# Patient Record
Sex: FEMALE | Race: WHITE | NOT HISPANIC OR LATINO | URBAN - METROPOLITAN AREA
[De-identification: names, ages, dates, MRNs, and addresses within clinical notes are randomized per-mention and may not be internally consistent; named-entity substitution may affect disease eponyms.]

---

## 2017-02-01 DIAGNOSIS — N95.1 HOT FLASHES, MENOPAUSAL: ICD-10-CM

## 2017-02-01 RX ORDER — ESTRADIOL 2 MG/1
TABLET ORAL
Qty: 30 TABLET | Refills: 5 | Status: SHIPPED | OUTPATIENT
Start: 2017-02-01 | End: 2017-02-27 | Stop reason: SDUPTHER

## 2017-02-07 RX ORDER — GABAPENTIN 800 MG/1
TABLET ORAL
Qty: 90 TABLET | Refills: 2 | Status: SHIPPED | OUTPATIENT
Start: 2017-02-07 | End: 2017-05-01 | Stop reason: SDUPTHER

## 2017-02-12 DIAGNOSIS — I10 ESSENTIAL HYPERTENSION: ICD-10-CM

## 2017-02-13 RX ORDER — OMEPRAZOLE 20 MG/1
CAPSULE, DELAYED RELEASE ORAL
Qty: 90 CAPSULE | Refills: 1 | Status: SHIPPED | OUTPATIENT
Start: 2017-02-13 | End: 2017-08-10 | Stop reason: SDUPTHER

## 2017-02-21 ENCOUNTER — OFFICE VISIT (OUTPATIENT)
Dept: INTERNAL MEDICINE | Facility: CLINIC | Age: 47
End: 2017-02-21

## 2017-02-21 VITALS
BODY MASS INDEX: 27.98 KG/M2 | WEIGHT: 153 LBS | HEART RATE: 62 BPM | RESPIRATION RATE: 16 BRPM | DIASTOLIC BLOOD PRESSURE: 96 MMHG | SYSTOLIC BLOOD PRESSURE: 132 MMHG

## 2017-02-21 DIAGNOSIS — F41.9 ANXIETY: Chronic | ICD-10-CM

## 2017-02-21 DIAGNOSIS — K21.9 GASTROESOPHAGEAL REFLUX DISEASE WITHOUT ESOPHAGITIS: Chronic | ICD-10-CM

## 2017-02-21 DIAGNOSIS — F32.A DEPRESSION, UNSPECIFIED DEPRESSION TYPE: ICD-10-CM

## 2017-02-21 DIAGNOSIS — I10 ESSENTIAL HYPERTENSION: Primary | Chronic | ICD-10-CM

## 2017-02-21 LAB
ALBUMIN SERPL-MCNC: 4.2 G/DL (ref 3.2–4.8)
ALBUMIN/GLOB SERPL: 1.2 G/DL (ref 1.5–2.5)
ALP SERPL-CCNC: 114 U/L (ref 25–100)
ALT SERPL W P-5'-P-CCNC: 52 U/L (ref 7–40)
ANION GAP SERPL CALCULATED.3IONS-SCNC: 5 MMOL/L (ref 3–11)
AST SERPL-CCNC: 52 U/L (ref 0–33)
BILIRUB SERPL-MCNC: 0.6 MG/DL (ref 0.3–1.2)
BUN BLD-MCNC: 9 MG/DL (ref 9–23)
BUN/CREAT SERPL: 9 (ref 7–25)
CALCIUM SPEC-SCNC: 10 MG/DL (ref 8.7–10.4)
CHLORIDE SERPL-SCNC: 103 MMOL/L (ref 99–109)
CO2 SERPL-SCNC: 32 MMOL/L (ref 20–31)
CREAT BLD-MCNC: 1 MG/DL (ref 0.6–1.3)
DEPRECATED RDW RBC AUTO: 43 FL (ref 37–54)
ERYTHROCYTE [DISTWIDTH] IN BLOOD BY AUTOMATED COUNT: 12.8 % (ref 11.3–14.5)
GFR SERPL CREATININE-BSD FRML MDRD: 60 ML/MIN/1.73
GLOBULIN UR ELPH-MCNC: 3.5 GM/DL
GLUCOSE BLD-MCNC: 99 MG/DL (ref 70–100)
HCT VFR BLD AUTO: 43.8 % (ref 34.5–44)
HGB BLD-MCNC: 14.7 G/DL (ref 11.5–15.5)
MCH RBC QN AUTO: 30.6 PG (ref 27–31)
MCHC RBC AUTO-ENTMCNC: 33.6 G/DL (ref 32–36)
MCV RBC AUTO: 91.1 FL (ref 80–99)
PLATELET # BLD AUTO: 290 10*3/MM3 (ref 150–450)
PMV BLD AUTO: 10.6 FL (ref 6–12)
POTASSIUM BLD-SCNC: 4.2 MMOL/L (ref 3.5–5.5)
PROT SERPL-MCNC: 7.7 G/DL (ref 5.7–8.2)
RBC # BLD AUTO: 4.81 10*6/MM3 (ref 3.89–5.14)
SODIUM BLD-SCNC: 140 MMOL/L (ref 132–146)
WBC NRBC COR # BLD: 8.13 10*3/MM3 (ref 3.5–10.8)

## 2017-02-21 PROCEDURE — 99214 OFFICE O/P EST MOD 30 MIN: CPT | Performed by: INTERNAL MEDICINE

## 2017-02-21 PROCEDURE — 36415 COLL VENOUS BLD VENIPUNCTURE: CPT | Performed by: INTERNAL MEDICINE

## 2017-02-21 PROCEDURE — 85027 COMPLETE CBC AUTOMATED: CPT | Performed by: INTERNAL MEDICINE

## 2017-02-21 PROCEDURE — 80053 COMPREHEN METABOLIC PANEL: CPT | Performed by: INTERNAL MEDICINE

## 2017-02-21 NOTE — PROGRESS NOTES
Subjective   Luz Elena Ballard is a 46 y.o. female.   Pt is here to follow up on GERD,HTN and anxiety/depression.           History of Present Illness   Pt is here to follow up on GERD,HTN and anxiety/depression.   She states that all chronic issues are doing well, she is now enrolled in a new Suboxone clinic and doing well with this. She has been going sine December 2016 to [facility].   GERD is controlled.   She does state HTN is doing well, but when she has been going to the Suboxone clinic her systolic has been elevated in 160's.   She states the whole time she was incarcerated she did not take her estrace and did fine with all hormonal symptoms (she has had hysterectomy). When she was released from detention she restarted this and it seems her hot flashes have returned? She does not think it is the Suboxone as she took this in the past with estrace and had no issues.                 The following portions of the patient's history were reviewed and updated as appropriate: allergies, current medications, past family history, past medical history, past social history, past surgical history and problem list.           Review of Systems   Constitutional: Negative.    HENT: Negative.    Eyes: Negative.    Respiratory: Negative.    Cardiovascular:        See HPI.    Gastrointestinal:        See HPI.    Endocrine: Negative.    Genitourinary: Negative.    Musculoskeletal: Negative.    Skin: Negative.    Allergic/Immunologic: Negative.    Neurological: Negative.    Hematological: Negative.    Psychiatric/Behavioral:        See HPI.               Objective   Physical Exam   Constitutional: She is oriented to person, place, and time. She appears well-developed and well-nourished.   HENT:   Head: Normocephalic and atraumatic.   Right Ear: External ear normal.   Left Ear: External ear normal.   Nose: Nose normal.   Mouth/Throat: Oropharynx is clear and moist.   Eyes: Conjunctivae and EOM are normal. Pupils are equal, round, and  reactive to light.   Neck: Normal range of motion. Neck supple. No tracheal deviation present. No thyromegaly present.   Cardiovascular: Normal rate, regular rhythm, normal heart sounds and intact distal pulses.    Pulmonary/Chest: Effort normal and breath sounds normal.   Abdominal: Soft. Bowel sounds are normal. She exhibits no distension. There is no tenderness.   Neurological: She is alert and oriented to person, place, and time. She has normal reflexes.   Skin: Skin is warm and dry.   Psychiatric: She has a normal mood and affect.   Nursing note and vitals reviewed.            Assessment/Plan      Essential hypertension  -     Comprehensive Metabolic Panel  -     CBC (No Diff)    Gastroesophageal reflux disease without esophagitis    Anxiety    Depression, unspecified depression type        1.) Monitor BP at home, discussed optimal range, notify me if not in range.   2.) Follow up in 3 months or sooner if needed.   3.) 15 minutes spent in exam, 10 minutes spent counseling on preventative screens and vaccines.   4.) Add Black Cohosh to estrace and see if this helps hot flashes, or try stopping it. If needed we will consider increasing dose.   5.) Check CBC and CMP     * Total time spent in discussion and exam 25 minutes.

## 2017-02-27 ENCOUNTER — TELEPHONE (OUTPATIENT)
Dept: INTERNAL MEDICINE | Facility: CLINIC | Age: 47
End: 2017-02-27

## 2017-02-27 DIAGNOSIS — N95.1 HOT FLASHES, MENOPAUSAL: ICD-10-CM

## 2017-02-27 RX ORDER — ESTRADIOL 2 MG/1
2 TABLET ORAL DAILY
Qty: 30 TABLET | Refills: 5 | Status: SHIPPED | OUTPATIENT
Start: 2017-02-27 | End: 2017-03-15 | Stop reason: SDUPTHER

## 2017-02-27 NOTE — TELEPHONE ENCOUNTER
----- Message from Rylie To sent at 2/27/2017 10:59 AM EST -----  Contact: Patient  Calling to get refill but at last visit discussed increasing dosage.  Medication is Estradial.  Currently on 2mg.  Uses [facility] on [address] in [place] and patient can be reached at [phone].

## 2017-03-06 ENCOUNTER — OFFICE VISIT (OUTPATIENT)
Dept: INTERNAL MEDICINE | Facility: CLINIC | Age: 47
End: 2017-03-06

## 2017-03-06 VITALS
SYSTOLIC BLOOD PRESSURE: 120 MMHG | RESPIRATION RATE: 16 BRPM | WEIGHT: 152 LBS | HEART RATE: 66 BPM | DIASTOLIC BLOOD PRESSURE: 80 MMHG | BODY MASS INDEX: 27.8 KG/M2

## 2017-03-06 DIAGNOSIS — B96.89 BACTERIAL VAGINOSIS: ICD-10-CM

## 2017-03-06 DIAGNOSIS — N76.0 BACTERIAL VAGINOSIS: ICD-10-CM

## 2017-03-06 DIAGNOSIS — J06.9 URTI (ACUTE UPPER RESPIRATORY INFECTION): Primary | ICD-10-CM

## 2017-03-06 PROCEDURE — 99214 OFFICE O/P EST MOD 30 MIN: CPT | Performed by: INTERNAL MEDICINE

## 2017-03-06 RX ORDER — CLINDAMYCIN HYDROCHLORIDE 300 MG/1
300 CAPSULE ORAL 2 TIMES DAILY
Qty: 20 CAPSULE | Refills: 0 | Status: SHIPPED | OUTPATIENT
Start: 2017-03-06 | End: 2017-04-10

## 2017-03-06 RX ORDER — FLUCONAZOLE 150 MG/1
150 TABLET ORAL ONCE
Qty: 1 TABLET | Refills: 1 | Status: SHIPPED | OUTPATIENT
Start: 2017-03-06 | End: 2017-07-02 | Stop reason: SDUPTHER

## 2017-03-06 NOTE — PROGRESS NOTES
Subjective   Luz Elena Ballard is a 46 y.o. female.   Pt presents today with URTI symptoms and bacterial vaginosis.           History of Present Illness   Pt presents today with URTI symptoms and bacterial vaginosis. She states she has had URTI symptoms including non productive cough and sinus congestion for 1 week. She denies fever or chills. She recently went to the health dept.and was diagnosed with bacterial vaginosis. She took 1 round of Flagyl for 7 days but states her symptoms did not completely go away.  She would also like diflucan to be prescribed as she gets a yeast infection every time she takes antibiotics.             The following portions of the patient's history were reviewed and updated as appropriate: allergies, current medications, past family history, past medical history, past social history, past surgical history and problem list.             Review of Systems   Constitutional: Negative.    HENT:        See HPI.    Eyes: Negative.    Respiratory:        See HPI.    Cardiovascular: Negative.    Gastrointestinal: Negative.    Endocrine: Negative.    Genitourinary:        See HPI.    Musculoskeletal: Negative.    Skin: Negative.    Allergic/Immunologic: Negative.    Neurological: Negative.    Hematological: Negative.    Psychiatric/Behavioral: Negative.                 Objective   Physical Exam   Constitutional: She is oriented to person, place, and time. She appears well-developed and well-nourished.   HENT:   Head: Normocephalic and atraumatic.   Nose: Nose normal.   Bilateral effusions.  Moderate oropharyngeal drainage.   Tenderness to palpation of maxillary sinuses.    Eyes: Conjunctivae and EOM are normal. Pupils are equal, round, and reactive to light.   Neck: Normal range of motion. Neck supple.   Cardiovascular: Normal rate, regular rhythm, normal heart sounds and intact distal pulses.    Pulmonary/Chest: Effort normal and breath sounds normal.   Abdominal: Soft. Bowel sounds are normal.    Neurological: She is alert and oriented to person, place, and time. She has normal reflexes.   Skin: Skin is warm and dry.   Psychiatric: She has a normal mood and affect.   Nursing note and vitals reviewed.               Assessment/Plan    URTI (acute upper respiratory infection)  -     clindamycin (CLEOCIN) 300 MG capsule; Take 1 capsule by mouth 2 (Two) Times a Day.  -     fluconazole (DIFLUCAN) 150 MG tablet; Take 1 tablet by mouth 1 (One) Time for 1 dose.    Bacterial vaginosis      1.) Clindamycin 300 mg PO BID X 10 days, How to dose and possible s/e discussed.   2.) Diflucan 150 mg PO X 1 Dose, wait 72 hours and if not clear, take 2nd dose. How to dose and possible s/e discussed.   3.) Sinus rinse as directed.   4.) Keep scheduled follow up

## 2017-03-15 ENCOUNTER — TELEPHONE (OUTPATIENT)
Dept: INTERNAL MEDICINE | Facility: CLINIC | Age: 47
End: 2017-03-15

## 2017-03-15 DIAGNOSIS — E34.9 HORMONE IMBALANCE: Primary | ICD-10-CM

## 2017-03-15 DIAGNOSIS — N95.1 HOT FLASHES, MENOPAUSAL: ICD-10-CM

## 2017-03-15 DIAGNOSIS — F32.A DEPRESSION: ICD-10-CM

## 2017-03-15 RX ORDER — FLUOXETINE HYDROCHLORIDE 20 MG/1
CAPSULE ORAL
Qty: 90 CAPSULE | Refills: 4 | Status: SHIPPED | OUTPATIENT
Start: 2017-03-15 | End: 2017-08-12 | Stop reason: SDUPTHER

## 2017-03-15 RX ORDER — ESTRADIOL 2 MG/1
2 TABLET ORAL DAILY
Qty: 30 TABLET | Refills: 5 | Status: SHIPPED | OUTPATIENT
Start: 2017-03-15 | End: 2017-08-26 | Stop reason: SDUPTHER

## 2017-03-15 RX ORDER — LISINOPRIL 20 MG/1
TABLET ORAL
Qty: 30 TABLET | Refills: 4 | Status: SHIPPED | OUTPATIENT
Start: 2017-03-15 | End: 2017-08-10 | Stop reason: SDUPTHER

## 2017-03-15 NOTE — TELEPHONE ENCOUNTER
Pt has been informed of 's message and that someone should contact her soon concerning GYN referral

## 2017-03-15 NOTE — TELEPHONE ENCOUNTER
[phone]    NEEDS REFILL OF ESTRODIAL 2MG-SHE HAS DOUBLED THIS SO IS ALMOST OUT AND IT IS WORKING SHE IS TAKING 4MG 1X DAY.    [facility]

## 2017-03-15 NOTE — TELEPHONE ENCOUNTER
I cant advise her to remain on 4 mg daily, I have put in the referral to GYN and if they say 4 mg is ok then I will be fine as well.

## 2017-03-15 NOTE — TELEPHONE ENCOUNTER
Informed pt and she really prefers to stay on the 4 mg because it is really working for the hot flashes, she has been doing this for 2 weeks now  and she agrees to see a GYN also if she can just stay on the 4 mg for now.

## 2017-03-15 NOTE — TELEPHONE ENCOUNTER
----- Message from Lisa Monge sent at 3/15/2017 11:42 AM EDT -----  PATIENT HAS QUESTIONS REGARDING MEDICATIONS THAT WERE CALLED IN. ESTRACE IS SUPPOSED TO BE 4MG SHE HAS BEEN DOUBLING ON THE 2. PLEASE CALL TO DISCUSS.    PHARMACY: JAVIER RODRIGUEZ [facility]    [phone], PATIENT

## 2017-03-15 NOTE — TELEPHONE ENCOUNTER
I want her to go back down to 2mg PO daily for 21 days on and 7 days off.     If this is not working I need to send her to GYN

## 2017-04-07 PROCEDURE — 87186 SC STD MICRODIL/AGAR DIL: CPT | Performed by: FAMILY MEDICINE

## 2017-04-07 PROCEDURE — 87147 CULTURE TYPE IMMUNOLOGIC: CPT | Performed by: FAMILY MEDICINE

## 2017-04-07 PROCEDURE — 87077 CULTURE AEROBIC IDENTIFY: CPT | Performed by: FAMILY MEDICINE

## 2017-04-07 PROCEDURE — 87070 CULTURE OTHR SPECIMN AEROBIC: CPT | Performed by: FAMILY MEDICINE

## 2017-04-10 ENCOUNTER — TELEPHONE (OUTPATIENT)
Dept: URGENT CARE | Facility: CLINIC | Age: 47
End: 2017-04-10

## 2017-04-10 ENCOUNTER — OFFICE VISIT (OUTPATIENT)
Dept: OBSTETRICS AND GYNECOLOGY | Facility: CLINIC | Age: 47
End: 2017-04-10

## 2017-04-10 VITALS
BODY MASS INDEX: 29.27 KG/M2 | RESPIRATION RATE: 14 BRPM | WEIGHT: 155 LBS | HEIGHT: 61 IN | OXYGEN SATURATION: 98 % | DIASTOLIC BLOOD PRESSURE: 78 MMHG | SYSTOLIC BLOOD PRESSURE: 116 MMHG | HEART RATE: 69 BPM

## 2017-04-10 DIAGNOSIS — Z12.39 SCREENING BREAST EXAMINATION: Primary | ICD-10-CM

## 2017-04-10 PROBLEM — Z72.0 TOBACCO ABUSE: Status: ACTIVE | Noted: 2017-04-10

## 2017-04-10 PROBLEM — N95.1 VASOMOTOR SYMPTOMS DUE TO MENOPAUSE: Status: ACTIVE | Noted: 2017-04-10

## 2017-04-10 PROBLEM — Z01.419 WELL WOMAN EXAM WITH ROUTINE GYNECOLOGICAL EXAM: Status: ACTIVE | Noted: 2017-04-10

## 2017-04-10 PROCEDURE — 99386 PREV VISIT NEW AGE 40-64: CPT | Performed by: OBSTETRICS & GYNECOLOGY

## 2017-04-10 NOTE — PROGRESS NOTES
"Subjective   Chief Complaint   Patient presents with   • Establish Care     Hot flashes and night sweats     Luz Elena Ballard is a 46 y.o. year old  menopausal female presenting to be seen for her annual exam.  This past year she has been on hormone replacement therapy.  There has not been vaginal bleeding in the last 12 months.  Menopausal symptoms are present (decreased libido, hot flashes, moodiness, night sweats and vaginal dryness) and are significantly affecting her quality of life.    SEXUAL Hx:  She is currently sexually active.  In the past year there has not been new sexual partners.    Condoms are not typically used.  She would not like to be screened for STD's at today's exam.  HEALTH Hx:  She exercises regularly: no (and has no plans to become more active).  She wears her seat belt: yes.  She has concerns about domestic violence: no.  She has noticed changes in height: no.  OTHER COMPLAINTS:  Nothing else    The following portions of the patient's history were reviewed and updated as appropriate:problem list, current medications, allergies, past family history, past medical history, past social history and past surgical history.    Smoking status: Current Every Day Smoker                                                   Packs/day: 0.00      Years: 0.00         Smokeless status: Not on file                         Review of Systems  Constitutional POS: fatigue, malaise, night sweats and weight gain    NEG: anorexia or night sweats   Gastointestinal POS: nothing reported    NEG: bloating, change in bowel habits, melena or reflux symptoms   Genitourinary POS: Vaginal dryness, dyspareunia    NEG: dysuria or hematuria   Integument POS: nothing reported    NEG: moles that are changing in size, shape, color or rashes   Breast POS: nothing reported    NEG: persistent breast lump, skin dimpling or nipple discharge        Objective   /78  Pulse 69  Resp 14  Ht 61\" (154.9 cm)  Wt 155 lb (70.3 kg)  " SpO2 98%  BMI 29.29 kg/m2    General:  well developed; well nourished  no acute distress   Skin:  No suspicious lesions seen   Thyroid: normal to inspection and palpation   Breasts:  Examined in supine position  Symmetric without masses or skin dimpling  Nipples normal without inversion, lesions or discharge  There are no palpable axillary nodes  Fibrocystic changes are present both breasts without a discrete mass   Abdomen: soft, non-tender; no masses  no umbilical or inginual hernias are present  no hepato-splenomegaly   Pelvis: Clinical staff was present for exam  External genitalia:  normal appearance of the external genitalia including Bartholin's and [place]'s glands.  :  urethral meatus normal; urethral hypermobility is absent.  Vaginal:  atrophic mucosal changes are present;  Cervix:  absent.  Uterus:  absent.  Adnexa:  absent, bilateral.        Assessment   1. Well woman exam  2. Vasomotor symptoms  3. Tobacco abuse     Plan   1. Await mammogram results for plan of care  2. Discussed at length the risks of smoking with concomitant hormone use including the risk of stroke, blood clot and heart attack.  Informed patient that she needs to immediately quit smoking and to diminish her use of estradiol as she is currently taking 2 mg daily.  Will incorporate Aygestin to see if that will improve vasomotor symptoms.  Patient will return to clinic in 1 month for follow-up  3. Patient states that she is not a daily smoker and was given a plan to wean.  Patient stated plans to have stopped smoking by the next visit      New Medications Ordered This Visit   Medications   • norethindrone (AYGESTIN) 5 MG tablet     Sig: Take 1 tablet by mouth Daily.     Dispense:  90 tablet     Refill:  3          This note was electronically signed.    Gurmeet Herrera MD MBA  April 10, 2017

## 2017-04-10 NOTE — TELEPHONE ENCOUNTER
Discussed lab results with the patient.  She advised the wound appears to be fine.  Advised patient that we can call in another antibiotic or have me look at the wound.  The patient elected the latter and will come in later today.

## 2017-04-11 ENCOUNTER — TELEPHONE (OUTPATIENT)
Dept: URGENT CARE | Facility: CLINIC | Age: 47
End: 2017-04-11

## 2017-04-11 NOTE — TELEPHONE ENCOUNTER
"Per Dr. Walker, spoke to patient re: positive lab results & to get clarification re: meds.  Patient was already aware that her culture came back positive for MRSA.  Stated that \"the lady that was here\" told her to stop cephalexin & start cream that was prescribed.  Patient states she is better.    "

## 2017-05-01 RX ORDER — GABAPENTIN 800 MG/1
TABLET ORAL
Qty: 90 TABLET | Refills: 2 | Status: SHIPPED | OUTPATIENT
Start: 2017-05-01 | End: 2017-08-17 | Stop reason: SDUPTHER

## 2017-06-20 ENCOUNTER — OFFICE VISIT (OUTPATIENT)
Dept: INTERNAL MEDICINE | Facility: CLINIC | Age: 47
End: 2017-06-20

## 2017-06-20 VITALS
HEART RATE: 76 BPM | TEMPERATURE: 98.8 F | BODY MASS INDEX: 29.17 KG/M2 | DIASTOLIC BLOOD PRESSURE: 84 MMHG | WEIGHT: 154.4 LBS | SYSTOLIC BLOOD PRESSURE: 126 MMHG

## 2017-06-20 DIAGNOSIS — J40 BRONCHITIS: Primary | ICD-10-CM

## 2017-06-20 DIAGNOSIS — I10 ESSENTIAL HYPERTENSION: Chronic | ICD-10-CM

## 2017-06-20 PROCEDURE — 99214 OFFICE O/P EST MOD 30 MIN: CPT | Performed by: INTERNAL MEDICINE

## 2017-06-20 RX ORDER — ONDANSETRON 4 MG/1
TABLET, ORALLY DISINTEGRATING ORAL
Refills: 0 | COMMUNITY
Start: 2017-06-13 | End: 2018-01-17 | Stop reason: SDUPTHER

## 2017-06-20 RX ORDER — NITROFURANTOIN 25; 75 MG/1; MG/1
CAPSULE ORAL
Refills: 0 | COMMUNITY
Start: 2017-06-13 | End: 2017-07-05

## 2017-06-20 RX ORDER — PROMETHAZINE HYDROCHLORIDE AND CODEINE PHOSPHATE 6.25; 1 MG/5ML; MG/5ML
SYRUP ORAL
Qty: 120 ML | Refills: 1 | Status: SHIPPED | OUTPATIENT
Start: 2017-06-20 | End: 2017-10-13

## 2017-06-20 RX ORDER — CEFDINIR 300 MG/1
300 CAPSULE ORAL 2 TIMES DAILY
Qty: 14 CAPSULE | Refills: 0 | Status: SHIPPED | OUTPATIENT
Start: 2017-06-20 | End: 2017-07-05

## 2017-06-20 RX ORDER — METHYLPREDNISOLONE 4 MG/1
TABLET ORAL
Qty: 21 TABLET | Refills: 0 | Status: SHIPPED | OUTPATIENT
Start: 2017-06-20 | End: 2017-07-05

## 2017-06-20 NOTE — PROGRESS NOTES
Subjective   Luz Elena Ballard is a 47 y.o. female.     History of Present Illness   2 week history of cough and congestion.  No fever, sore throat, ear ache.  She has some wheezing recently and sob.  She does smoke.  Sometimes she cannot sleep because of coughing.    The following portions of the patient's history were reviewed and updated as appropriate: allergies, current medications, past medical history and problem list.    Review of Systems   Constitutional: Positive for fatigue.   HENT: Positive for congestion. Negative for ear pain, rhinorrhea and sinus pressure.    Respiratory: Positive for cough, chest tightness, shortness of breath and wheezing.    Cardiovascular: Negative.  Negative for chest pain, palpitations and leg swelling.   Gastrointestinal: Negative.  Negative for abdominal distention and abdominal pain.       Objective   Physical Exam   Constitutional: She appears well-developed and well-nourished.   Neck: Normal range of motion. Neck supple.   Cardiovascular: Normal rate, regular rhythm and normal heart sounds.  Exam reveals no gallop and no friction rub.    No murmur heard.  Pulmonary/Chest: Effort normal. No respiratory distress. She has wheezes. She has no rales. She exhibits no tenderness.   Rhonchi at bases bilaterally.   Abdominal: Soft. Bowel sounds are normal. She exhibits no distension. There is no tenderness.   Nursing note and vitals reviewed.      Assessment/Plan   Luz Elena was seen today for cough.    Diagnoses and all orders for this visit:    Bronchitis  -     MethylPREDNISolone (MEDROL, FELIBERTO,) 4 MG tablet; Take as directed on package instructions.  -     cefdinir (OMNICEF) 300 MG capsule; Take 1 capsule by mouth 2 (Two) Times a Day.  -     promethazine-codeine (PHENERGAN with CODEINE) 6.25-10 MG/5ML syrup; One teaspoon q 6 hours prn cough    Essential hypertension    Believe she has asthmatic bronchitis.  Treat as above.  Call if not better.

## 2017-06-27 ENCOUNTER — TELEPHONE (OUTPATIENT)
Dept: INTERNAL MEDICINE | Facility: CLINIC | Age: 47
End: 2017-06-27

## 2017-07-02 DIAGNOSIS — J06.9 URTI (ACUTE UPPER RESPIRATORY INFECTION): ICD-10-CM

## 2017-07-03 RX ORDER — FLUCONAZOLE 150 MG/1
TABLET ORAL
Qty: 1 TABLET | Refills: 1 | Status: SHIPPED | OUTPATIENT
Start: 2017-07-03 | End: 2017-07-05

## 2017-07-03 NOTE — TELEPHONE ENCOUNTER
Pt states her ear pain is better. She now has a yeast infection which diflucan was requested by pharmacy earlier today and already sent in. Pt follows up with Dr. Payne on 7/5 and stated she would be fine until then.

## 2017-07-03 NOTE — TELEPHONE ENCOUNTER
LVM notifying pt Rx sent to pharmacy. Advised to call if any further questions and provided office number.

## 2017-07-05 ENCOUNTER — OFFICE VISIT (OUTPATIENT)
Dept: INTERNAL MEDICINE | Facility: CLINIC | Age: 47
End: 2017-07-05

## 2017-07-05 VITALS
DIASTOLIC BLOOD PRESSURE: 82 MMHG | SYSTOLIC BLOOD PRESSURE: 120 MMHG | WEIGHT: 153 LBS | RESPIRATION RATE: 16 BRPM | BODY MASS INDEX: 28.91 KG/M2 | HEART RATE: 72 BPM | TEMPERATURE: 98.6 F

## 2017-07-05 DIAGNOSIS — F41.9 ANXIETY: Chronic | ICD-10-CM

## 2017-07-05 DIAGNOSIS — I10 ESSENTIAL HYPERTENSION: Primary | Chronic | ICD-10-CM

## 2017-07-05 DIAGNOSIS — M25.512 CHRONIC LEFT SHOULDER PAIN: ICD-10-CM

## 2017-07-05 DIAGNOSIS — K21.9 GASTROESOPHAGEAL REFLUX DISEASE WITHOUT ESOPHAGITIS: Chronic | ICD-10-CM

## 2017-07-05 DIAGNOSIS — K58.9 IRRITABLE BOWEL SYNDROME, UNSPECIFIED TYPE: Chronic | ICD-10-CM

## 2017-07-05 DIAGNOSIS — G89.29 CHRONIC LEFT SHOULDER PAIN: ICD-10-CM

## 2017-07-05 DIAGNOSIS — F32.A DEPRESSION, UNSPECIFIED DEPRESSION TYPE: ICD-10-CM

## 2017-07-05 PROBLEM — Z01.419 WELL WOMAN EXAM WITH ROUTINE GYNECOLOGICAL EXAM: Status: RESOLVED | Noted: 2017-04-10 | Resolved: 2017-07-05

## 2017-07-05 PROBLEM — Z12.39 SCREENING BREAST EXAMINATION: Status: RESOLVED | Noted: 2017-04-10 | Resolved: 2017-07-05

## 2017-07-05 PROBLEM — J06.9 URTI (ACUTE UPPER RESPIRATORY INFECTION): Status: RESOLVED | Noted: 2017-03-06 | Resolved: 2017-07-05

## 2017-07-05 PROCEDURE — 99214 OFFICE O/P EST MOD 30 MIN: CPT | Performed by: INTERNAL MEDICINE

## 2017-07-05 RX ORDER — HYDROXYZINE HYDROCHLORIDE 10 MG/1
10 TABLET, FILM COATED ORAL EVERY 6 HOURS PRN
Qty: 30 TABLET | Refills: 3 | Status: SHIPPED | OUTPATIENT
Start: 2017-07-05 | End: 2017-10-13 | Stop reason: SDUPTHER

## 2017-07-05 NOTE — PROGRESS NOTES
Subjective   Luz Elena Ballard is a 47 y.o. female.   Pt is here to follow up on HTN,GERD,IBS,chronic left shoulder pain, anxiety/depression.             History of Present Illness   Pt is here to follow up on HTN,GERD,IBS,chronic left shoulder pain, anxiety/depression.   She states all chronic issues are doing well. That being said, she would like something she can take prn for her anxiety that would not interfere with her suboxone. She does not want to go up on her Prozac as it usually works well. She feels her anxiety is not as well controlled because they have started titrating down on her suboxone.   Bronchitis has resolved.               The following portions of the patient's history were reviewed and updated as appropriate: allergies, current medications, past family history, past medical history, past social history, past surgical history and problem list.             Review of Systems   Constitutional: Negative.    HENT: Negative.    Eyes: Negative.    Respiratory: Negative.    Cardiovascular:        See HPI.    Gastrointestinal:        See HPI.    Endocrine: Negative.    Genitourinary: Negative.    Musculoskeletal:        See HPI.    Skin: Negative.    Allergic/Immunologic: Negative.    Neurological: Negative.    Hematological: Negative.    Psychiatric/Behavioral:        See HPI.            Objective   Physical Exam   Constitutional: She is oriented to person, place, and time. She appears well-developed and well-nourished.   HENT:   Head: Normocephalic and atraumatic.   Right Ear: External ear normal.   Left Ear: External ear normal.   Nose: Nose normal.   Mouth/Throat: Oropharynx is clear and moist.   Eyes: Conjunctivae and EOM are normal. Pupils are equal, round, and reactive to light.   Neck: Normal range of motion. Neck supple.   Cardiovascular: Normal rate, regular rhythm, normal heart sounds and intact distal pulses.    Pulmonary/Chest: Effort normal and breath sounds normal.   Abdominal: Soft. Bowel  sounds are normal. She exhibits no distension. There is no tenderness.   Neurological: She is alert and oriented to person, place, and time. She has normal reflexes.   Skin: Skin is warm and dry.   Psychiatric: She has a normal mood and affect.   Nursing note and vitals reviewed.            Assessment/Plan      Essential hypertension    Gastroesophageal reflux disease without esophagitis    Irritable bowel syndrome, unspecified type    Chronic left shoulder pain    Anxiety  -     hydrOXYzine (ATARAX) 10 MG tablet; Take 1 tablet by mouth Every 6 (Six) Hours As Needed for Anxiety.    Depression, unspecified depression type        1.) Hydroxyzine 10 mg PO Q 6 prn for anxiety , how to dose and possible s/e discussed   2.) Follow up in 3 months

## 2017-07-17 ENCOUNTER — TELEPHONE (OUTPATIENT)
Dept: INTERNAL MEDICINE | Facility: CLINIC | Age: 47
End: 2017-07-17

## 2017-07-17 NOTE — TELEPHONE ENCOUNTER
Let's start out with Tessalon Perles 100 mg PO TID prn # 90 with no refills.     If this does not work she can call and let me know.

## 2017-07-17 NOTE — TELEPHONE ENCOUNTER
----- Message from Rylie To sent at 7/17/2017 10:55 AM EDT -----  Needs something called in for her cough.  Says Dr. Payne knows she has asthmatic  bronchitis and right now patient can't get rid of cough.  OTC meds do not work.  Requesting cough med that will help her sleep as it is worse at night.  Uses [facility] [address] in [place] and patient can be reached at [phone]

## 2017-07-24 ENCOUNTER — TELEPHONE (OUTPATIENT)
Dept: INTERNAL MEDICINE | Facility: CLINIC | Age: 47
End: 2017-07-24

## 2017-07-25 RX ORDER — GABAPENTIN 800 MG/1
TABLET ORAL
Qty: 90 TABLET | Refills: 2 | OUTPATIENT
Start: 2017-07-25

## 2017-08-10 DIAGNOSIS — I10 ESSENTIAL HYPERTENSION: ICD-10-CM

## 2017-08-10 RX ORDER — OMEPRAZOLE 20 MG/1
CAPSULE, DELAYED RELEASE ORAL
Qty: 90 CAPSULE | Refills: 1 | Status: SHIPPED | OUTPATIENT
Start: 2017-08-10 | End: 2017-10-13 | Stop reason: SDUPTHER

## 2017-08-10 RX ORDER — LISINOPRIL 20 MG/1
TABLET ORAL
Qty: 30 TABLET | Refills: 4 | Status: SHIPPED | OUTPATIENT
Start: 2017-08-10 | End: 2018-01-17 | Stop reason: SDUPTHER

## 2017-08-12 DIAGNOSIS — F32.A DEPRESSION: ICD-10-CM

## 2017-08-14 RX ORDER — FLUOXETINE HYDROCHLORIDE 20 MG/1
CAPSULE ORAL
Qty: 90 CAPSULE | Refills: 4 | Status: SHIPPED | OUTPATIENT
Start: 2017-08-14 | End: 2018-01-17 | Stop reason: SDUPTHER

## 2017-08-18 RX ORDER — GABAPENTIN 800 MG/1
TABLET ORAL
Qty: 90 TABLET | Refills: 0 | Status: SHIPPED | OUTPATIENT
Start: 2017-08-18 | End: 2017-09-18 | Stop reason: SDUPTHER

## 2017-08-26 DIAGNOSIS — N95.1 HOT FLASHES, MENOPAUSAL: ICD-10-CM

## 2017-08-28 RX ORDER — ESTRADIOL 2 MG/1
TABLET ORAL
Qty: 30 TABLET | Refills: 5 | Status: SHIPPED | OUTPATIENT
Start: 2017-08-28 | End: 2018-01-17 | Stop reason: SDUPTHER

## 2017-09-18 ENCOUNTER — TELEPHONE (OUTPATIENT)
Dept: INTERNAL MEDICINE | Facility: CLINIC | Age: 47
End: 2017-09-18

## 2017-09-18 RX ORDER — GABAPENTIN 800 MG/1
800 TABLET ORAL 3 TIMES DAILY
Qty: 90 TABLET | Refills: 0 | OUTPATIENT
Start: 2017-09-18 | End: 2017-10-13 | Stop reason: SDUPTHER

## 2017-09-18 NOTE — TELEPHONE ENCOUNTER
----- Message from Orquidea Mccracken sent at 9/18/2017 12:02 PM EDT -----  [phone]    PT NEEDS REFILL OF NEURONTIN.  CAN SHE GET THIS OR DOES SHE NEED TO BE SEEN?    JAVIER JOYNER

## 2017-10-13 ENCOUNTER — OFFICE VISIT (OUTPATIENT)
Dept: INTERNAL MEDICINE | Facility: CLINIC | Age: 47
End: 2017-10-13

## 2017-10-13 VITALS
RESPIRATION RATE: 16 BRPM | HEART RATE: 68 BPM | SYSTOLIC BLOOD PRESSURE: 132 MMHG | BODY MASS INDEX: 27.07 KG/M2 | DIASTOLIC BLOOD PRESSURE: 86 MMHG | TEMPERATURE: 97.9 F | OXYGEN SATURATION: 97 % | WEIGHT: 148 LBS

## 2017-10-13 DIAGNOSIS — F41.9 ANXIETY: Chronic | ICD-10-CM

## 2017-10-13 DIAGNOSIS — Z79.899 HIGH RISK MEDICATION USE: Primary | ICD-10-CM

## 2017-10-13 DIAGNOSIS — I10 ESSENTIAL HYPERTENSION: ICD-10-CM

## 2017-10-13 PROBLEM — B96.89 BACTERIAL VAGINOSIS: Status: RESOLVED | Noted: 2017-03-06 | Resolved: 2017-10-13

## 2017-10-13 PROBLEM — N76.0 BACTERIAL VAGINOSIS: Status: RESOLVED | Noted: 2017-03-06 | Resolved: 2017-10-13

## 2017-10-13 PROCEDURE — 99214 OFFICE O/P EST MOD 30 MIN: CPT | Performed by: PHYSICIAN ASSISTANT

## 2017-10-13 RX ORDER — OMEPRAZOLE 20 MG/1
20 CAPSULE, DELAYED RELEASE ORAL DAILY
Qty: 30 CAPSULE | Refills: 5 | Status: SHIPPED | OUTPATIENT
Start: 2017-10-13 | End: 2018-01-17 | Stop reason: SDUPTHER

## 2017-10-13 RX ORDER — HYDROXYZINE HYDROCHLORIDE 10 MG/1
10 TABLET, FILM COATED ORAL EVERY 6 HOURS PRN
Qty: 30 TABLET | Refills: 3 | Status: SHIPPED | OUTPATIENT
Start: 2017-10-13 | End: 2018-01-17 | Stop reason: SDUPTHER

## 2017-10-13 RX ORDER — IBUPROFEN 800 MG/1
800 TABLET ORAL EVERY 8 HOURS PRN
Qty: 90 TABLET | Refills: 2 | Status: SHIPPED | OUTPATIENT
Start: 2017-10-13 | End: 2018-01-17 | Stop reason: SDUPTHER

## 2017-10-13 NOTE — PROGRESS NOTES
Subjective   Luz Elena Ballard is a 47 y.o. female.   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  On prozac for anxiety and depression 60mg daily.    On gabapentin for chronic back pain and hx of osteomyelitis of the spine  HTn controlled on lisinopril.  The following portions of the patient's history were reviewed and updated as appropriate: allergies, current medications and problem list.    Review of Systems   Musculoskeletal: Positive for back pain.   Neurological: Negative for headaches.   Psychiatric/Behavioral: The patient is nervous/anxious.        Objective   Physical Exam   Constitutional: She appears well-developed and well-nourished.   HENT:   Head: Normocephalic and atraumatic.   Right Ear: External ear normal.   Left Ear: External ear normal.   Eyes: Conjunctivae are normal.   Cardiovascular: Normal rate, regular rhythm and normal heart sounds.  Exam reveals no gallop and no friction rub.    No murmur heard.  Pulmonary/Chest: Effort normal and breath sounds normal.   Psychiatric: She has a normal mood and affect.   Vitals reviewed.      Assessment/Plan   Luz Elena was seen today for hypertension.    Diagnoses and all orders for this visit:    High risk medication use  -     Cancel: Urine Drug Screen - Urine, Clean Catch    Anxiety  -     hydrOXYzine (ATARAX) 10 MG tablet; Take 1 tablet by mouth Every 6 (Six) Hours As Needed for Anxiety.    Essential hypertension  -     omeprazole (priLOSEC) 20 MG capsule; Take 1 capsule by mouth Daily.    Other orders  -     ibuprofen (ADVIL,MOTRIN) 800 MG tablet; Take 1 tablet by mouth Every 8 (Eight) Hours As Needed for Mild Pain .        JODI signed and seble reviewed and UDS done

## 2017-10-15 RX ORDER — GABAPENTIN 800 MG/1
800 TABLET ORAL 3 TIMES DAILY
Qty: 90 TABLET | Refills: 3 | OUTPATIENT
Start: 2017-10-15 | End: 2018-07-27 | Stop reason: SDUPTHER

## 2017-10-16 ENCOUNTER — TELEPHONE (OUTPATIENT)
Dept: INTERNAL MEDICINE | Facility: CLINIC | Age: 47
End: 2017-10-16

## 2017-10-16 NOTE — TELEPHONE ENCOUNTER
----- Message from Lisa Galeano sent at 10/16/2017 11:03 AM EDT -----  Patient called in regards to requesting a prescription refill be called into her pharmacy.    Prescription needed: Gabapentin     Pharmacy: [facility] [place]    Call back for patient: [phone]    Thank you.

## 2017-10-16 NOTE — TELEPHONE ENCOUNTER
Script called into pharmacy.     Call to pt to notify and had to LM.    Please notify pt script has been called into pharmacy.

## 2017-10-16 NOTE — TELEPHONE ENCOUNTER
Rx was supposed to called in already.  Pls get seble and if normal, then call into pharmacy and let pt know.

## 2018-01-17 ENCOUNTER — APPOINTMENT (OUTPATIENT)
Dept: LAB | Facility: HOSPITAL | Age: 48
End: 2018-01-17

## 2018-01-17 ENCOUNTER — OFFICE VISIT (OUTPATIENT)
Dept: FAMILY MEDICINE CLINIC | Facility: CLINIC | Age: 48
End: 2018-01-17

## 2018-01-17 VITALS
BODY MASS INDEX: 27.79 KG/M2 | DIASTOLIC BLOOD PRESSURE: 90 MMHG | SYSTOLIC BLOOD PRESSURE: 132 MMHG | OXYGEN SATURATION: 98 % | HEIGHT: 62 IN | TEMPERATURE: 98.3 F | WEIGHT: 151 LBS | HEART RATE: 82 BPM

## 2018-01-17 DIAGNOSIS — B18.2 CHRONIC HEPATITIS C WITHOUT HEPATIC COMA (HCC): ICD-10-CM

## 2018-01-17 DIAGNOSIS — I10 ESSENTIAL HYPERTENSION: Primary | Chronic | ICD-10-CM

## 2018-01-17 DIAGNOSIS — M54.41 CHRONIC MIDLINE LOW BACK PAIN WITH RIGHT-SIDED SCIATICA: ICD-10-CM

## 2018-01-17 DIAGNOSIS — F41.9 ANXIETY: Chronic | ICD-10-CM

## 2018-01-17 DIAGNOSIS — F32.A DEPRESSION, UNSPECIFIED DEPRESSION TYPE: ICD-10-CM

## 2018-01-17 DIAGNOSIS — K21.9 GASTROESOPHAGEAL REFLUX DISEASE WITHOUT ESOPHAGITIS: Chronic | ICD-10-CM

## 2018-01-17 DIAGNOSIS — Z86.69 HISTORY OF MIGRAINE: ICD-10-CM

## 2018-01-17 DIAGNOSIS — N95.1 HOT FLASHES, MENOPAUSAL: ICD-10-CM

## 2018-01-17 DIAGNOSIS — Z72.0 TOBACCO ABUSE: ICD-10-CM

## 2018-01-17 DIAGNOSIS — G89.29 CHRONIC MIDLINE LOW BACK PAIN WITH RIGHT-SIDED SCIATICA: ICD-10-CM

## 2018-01-17 DIAGNOSIS — Z12.39 BREAST CANCER SCREENING: ICD-10-CM

## 2018-01-17 LAB
ALBUMIN SERPL-MCNC: 4 G/DL (ref 3.2–4.8)
ALBUMIN/GLOB SERPL: 1.1 G/DL (ref 1.5–2.5)
ALP SERPL-CCNC: 116 U/L (ref 25–100)
ALT SERPL W P-5'-P-CCNC: 51 U/L (ref 7–40)
ANION GAP SERPL CALCULATED.3IONS-SCNC: 7 MMOL/L (ref 3–11)
AST SERPL-CCNC: 51 U/L (ref 0–33)
BASOPHILS # BLD AUTO: 0.03 10*3/MM3 (ref 0–0.2)
BASOPHILS NFR BLD AUTO: 0.3 % (ref 0–1)
BILIRUB SERPL-MCNC: 0.8 MG/DL (ref 0.3–1.2)
BUN BLD-MCNC: 7 MG/DL (ref 9–23)
BUN/CREAT SERPL: 7.8 (ref 7–25)
CALCIUM SPEC-SCNC: 9.4 MG/DL (ref 8.7–10.4)
CHLORIDE SERPL-SCNC: 101 MMOL/L (ref 99–109)
CO2 SERPL-SCNC: 28 MMOL/L (ref 20–31)
CREAT BLD-MCNC: 0.9 MG/DL (ref 0.6–1.3)
DEPRECATED RDW RBC AUTO: 44.2 FL (ref 37–54)
EOSINOPHIL # BLD AUTO: 0.07 10*3/MM3 (ref 0–0.3)
EOSINOPHIL NFR BLD AUTO: 0.8 % (ref 0–3)
ERYTHROCYTE [DISTWIDTH] IN BLOOD BY AUTOMATED COUNT: 13.4 % (ref 11.3–14.5)
GFR SERPL CREATININE-BSD FRML MDRD: 67 ML/MIN/1.73
GLOBULIN UR ELPH-MCNC: 3.8 GM/DL
GLUCOSE BLD-MCNC: 81 MG/DL (ref 70–100)
HCT VFR BLD AUTO: 40.6 % (ref 34.5–44)
HGB BLD-MCNC: 13.8 G/DL (ref 11.5–15.5)
IMM GRANULOCYTES # BLD: 0.01 10*3/MM3 (ref 0–0.03)
IMM GRANULOCYTES NFR BLD: 0.1 % (ref 0–0.6)
LYMPHOCYTES # BLD AUTO: 2.82 10*3/MM3 (ref 0.6–4.8)
LYMPHOCYTES NFR BLD AUTO: 30.9 % (ref 24–44)
MCH RBC QN AUTO: 30.7 PG (ref 27–31)
MCHC RBC AUTO-ENTMCNC: 34 G/DL (ref 32–36)
MCV RBC AUTO: 90.4 FL (ref 80–99)
MONOCYTES # BLD AUTO: 0.37 10*3/MM3 (ref 0–1)
MONOCYTES NFR BLD AUTO: 4.1 % (ref 0–12)
NEUTROPHILS # BLD AUTO: 5.83 10*3/MM3 (ref 1.5–8.3)
NEUTROPHILS NFR BLD AUTO: 63.8 % (ref 41–71)
PLATELET # BLD AUTO: 283 10*3/MM3 (ref 150–450)
PMV BLD AUTO: 10.7 FL (ref 6–12)
POTASSIUM BLD-SCNC: 4 MMOL/L (ref 3.5–5.5)
PROT SERPL-MCNC: 7.8 G/DL (ref 5.7–8.2)
RBC # BLD AUTO: 4.49 10*6/MM3 (ref 3.89–5.14)
SODIUM BLD-SCNC: 136 MMOL/L (ref 132–146)
WBC NRBC COR # BLD: 9.13 10*3/MM3 (ref 3.5–10.8)

## 2018-01-17 PROCEDURE — 99214 OFFICE O/P EST MOD 30 MIN: CPT | Performed by: PHYSICIAN ASSISTANT

## 2018-01-17 PROCEDURE — 80053 COMPREHEN METABOLIC PANEL: CPT | Performed by: PHYSICIAN ASSISTANT

## 2018-01-17 PROCEDURE — 85025 COMPLETE CBC W/AUTO DIFF WBC: CPT | Performed by: PHYSICIAN ASSISTANT

## 2018-01-17 PROCEDURE — 36415 COLL VENOUS BLD VENIPUNCTURE: CPT | Performed by: PHYSICIAN ASSISTANT

## 2018-01-17 PROCEDURE — 99406 BEHAV CHNG SMOKING 3-10 MIN: CPT | Performed by: PHYSICIAN ASSISTANT

## 2018-01-17 RX ORDER — ESTRADIOL 2 MG/1
2 TABLET ORAL DAILY
Qty: 30 TABLET | Refills: 11 | Status: SHIPPED | OUTPATIENT
Start: 2018-01-17 | End: 2019-01-25 | Stop reason: SDUPTHER

## 2018-01-17 RX ORDER — ONDANSETRON 4 MG/1
4 TABLET, ORALLY DISINTEGRATING ORAL EVERY 8 HOURS PRN
Qty: 15 TABLET | Refills: 5 | Status: SHIPPED | OUTPATIENT
Start: 2018-01-17 | End: 2018-05-22 | Stop reason: SDUPTHER

## 2018-01-17 RX ORDER — LISINOPRIL 20 MG/1
20 TABLET ORAL DAILY
Qty: 30 TABLET | Refills: 6 | Status: SHIPPED | OUTPATIENT
Start: 2018-01-17 | End: 2018-07-31 | Stop reason: SDUPTHER

## 2018-01-17 RX ORDER — BENZONATATE 100 MG/1
100 CAPSULE ORAL 3 TIMES DAILY PRN
COMMUNITY
End: 2018-01-17

## 2018-01-17 RX ORDER — HYDROXYZINE HYDROCHLORIDE 10 MG/1
10 TABLET, FILM COATED ORAL EVERY 6 HOURS PRN
Qty: 30 TABLET | Refills: 11 | Status: SHIPPED | OUTPATIENT
Start: 2018-01-17 | End: 2019-02-26

## 2018-01-17 RX ORDER — OMEPRAZOLE 20 MG/1
20 CAPSULE, DELAYED RELEASE ORAL DAILY
Qty: 30 CAPSULE | Refills: 11 | Status: SHIPPED | OUTPATIENT
Start: 2018-01-17 | End: 2019-01-25 | Stop reason: SDUPTHER

## 2018-01-17 RX ORDER — FLUOXETINE HYDROCHLORIDE 20 MG/1
20 CAPSULE ORAL DAILY
Qty: 90 CAPSULE | Refills: 11 | Status: SHIPPED | OUTPATIENT
Start: 2018-01-17 | End: 2018-07-31 | Stop reason: ALTCHOICE

## 2018-01-17 RX ORDER — IBUPROFEN 800 MG/1
800 TABLET ORAL EVERY 8 HOURS PRN
Qty: 90 TABLET | Refills: 5 | Status: SHIPPED | OUTPATIENT
Start: 2018-01-17 | End: 2019-02-26

## 2018-01-17 NOTE — PROGRESS NOTES
Subjective   Luz Elena Ballard is a 47 y.o. female    History of Present Illness  Patient comes in today as a new patient to our practice transferring her care from [facility].  She is here to follow-up on chronic medical conditions including estrogen replacement therapy due to menopausal symptoms following hysterectomy.  She is stable on Estrace, she is due for mammogram.  Not having any breast problems.  Hot flashes are well controlled.  She's here for follow-up on depression and anxiety which is currently stable on Prozac 60 mg daily.  She is due for refills.  She is here for follow-up on chronic back pain with radiculopathy into right lower extremity for which she takes gabapentin 800 mg 3 times daily.  She's been stable on this for greater than 1 year and is doing well without any adverse effects from medication.  She is due for refills.  Patient is due for refill hydroxyzine that she takes as needed for anxiety in association with Prozac.  She is here for follow-up on hypertension and for refills on lisinopril, blood pressures well-controlled and denies he problems on lisinopril.  She is due for labs.  She's here for follow-up on GERD and for refills on Prilosec, GERD is currently well-controlled and all symptoms under control on Prilosec.  She's here for follow-up on history of migraine headaches and is due a refill on ibuprofen and Zofran.  She states her migraine pattern is stable responds well to ibuprofen and Zofran.  Patient has a history of hepatitis C she states that she has seen a gastrologist in the past and is not currently interested in treatment.  She states she is asymptomatic.  She is aware that there was an effective treatment that is currently available.  She is due for labs checking her liver enzymes.  She avoids toxins to her liver specifically avoids Tylenol and avoid alcohol.  The patient's history of opioid substance abuse, has been on Suboxone the past and states she is off opioids  and Suboxone and currently sober/clean.  The following portions of the patient's history were reviewed and updated as appropriate: allergies, current medications, past social history and problem list    Review of Systems   Constitutional: Negative for appetite change, fatigue and unexpected weight change.   HENT: Negative for congestion, dental problem, postnasal drip, sinus pressure and sore throat.    Eyes: Negative for photophobia, pain and visual disturbance.   Respiratory: Negative for cough, chest tightness and shortness of breath.    Cardiovascular: Negative for chest pain, palpitations and leg swelling.   Gastrointestinal: Negative for abdominal distention, abdominal pain, constipation, diarrhea, nausea and vomiting.        Patient not currently experiencing heartburn/acid reflux     Endocrine: Positive for heat intolerance ( stable on med).   Genitourinary: Negative.    Musculoskeletal: Positive for back pain ( stable on meds) and myalgias ( stable on meds). Negative for gait problem.   Skin: Negative for color change and rash.   Allergic/Immunologic: Positive for immunocompromised state ( hep C).   Neurological: Positive for headaches ( stable pattern). Negative for dizziness, tremors, syncope, facial asymmetry, speech difficulty, weakness, light-headedness and numbness.   Hematological: Negative.    Psychiatric/Behavioral: Negative for agitation, behavioral problems, confusion, decreased concentration, dysphoric mood, sleep disturbance and suicidal ideas. The patient is not nervous/anxious.        Objective     Vitals:    01/17/18 1448   BP: 132/90   Pulse: 82   Temp: 98.3 °F (36.8 °C)   SpO2: 98%       Physical Exam   Constitutional: She is oriented to person, place, and time. She appears well-developed and well-nourished. No distress.   HENT:   Head: Normocephalic and atraumatic.   Eyes: Conjunctivae are normal. No scleral icterus.   Neck: No JVD present. No thyroid mass and no thyromegaly present.    Cardiovascular: Normal rate, regular rhythm, normal heart sounds and intact distal pulses.    No murmur heard.  Pulmonary/Chest: Effort normal and breath sounds normal. No respiratory distress. She exhibits no tenderness.   Abdominal: Soft. She exhibits no distension and no mass. There is no tenderness. There is no rebound and no guarding. No hernia.   Musculoskeletal: Normal range of motion. She exhibits no edema, tenderness or deformity.   Neurological: She is alert and oriented to person, place, and time. No cranial nerve deficit.   Skin: Skin is warm and dry. She is not diaphoretic. No erythema. No pallor.   Psychiatric: She has a normal mood and affect. Her speech is normal and behavior is normal. Judgment and thought content normal. Her mood appears not anxious. Her affect is not angry and not inappropriate. Cognition and memory are normal. She does not exhibit a depressed mood. She is attentive.   Nursing note and vitals reviewed.    I counseled patient regarding the health benefits in smoking cessation for 4 minutes.  I also discussed with patient the increased health risks of continued smoking, including increased risk for cancers, increased risk for coronary artery disease, increased risk for stroke, heart attack, COPD and overall worsened lung function and increased lung infections.  We discussed different strategies for smoking cessation and prescriptions were offered to assist patient in smoking cessation.        Assessment/Plan     Diagnoses and all orders for this visit:    Essential hypertension  -     omeprazole (priLOSEC) 20 MG capsule; Take 1 capsule by mouth Daily.  -     lisinopril (PRINIVIL,ZESTRIL) 20 MG tablet; Take 1 tablet by mouth Daily.  -     Comprehensive Metabolic Panel    Breast cancer screening  -     Mammo Screening Digital Tomosynthesis Bilateral With CAD; Future    Hot flashes, menopausal  -     estradiol (ESTRACE) 2 MG tablet; Take 1 tablet by mouth Daily.    Anxiety  -      hydrOXYzine (ATARAX) 10 MG tablet; Take 1 tablet by mouth Every 6 (Six) Hours As Needed for Anxiety.    Depression, unspecified depression type  -     FLUoxetine (PROzac) 20 MG capsule; Take 1 capsule by mouth Daily.    Chronic midline low back pain with right-sided sciatica  -     ibuprofen (ADVIL,MOTRIN) 800 MG tablet; Take 1 tablet by mouth Every 8 (Eight) Hours As Needed for Mild Pain .    History of migraine  -     ibuprofen (ADVIL,MOTRIN) 800 MG tablet; Take 1 tablet by mouth Every 8 (Eight) Hours As Needed for Mild Pain .  -     ondansetron ODT (ZOFRAN-ODT) 4 MG disintegrating tablet; Take 1 tablet by mouth Every 8 (Eight) Hours As Needed for Nausea or Vomiting.    Gastroesophageal reflux disease without esophagitis  -     omeprazole (priLOSEC) 20 MG capsule; Take 1 capsule by mouth Daily.    Chronic hepatitis C without hepatic coma  -     Comprehensive Metabolic Panel  -     CBC & Differential  -     CBC Auto Differential    Tobacco abuse    Other orders  -     Discontinue: benzonatate (TESSALON) 100 MG capsule; Take 100 mg by mouth 3 (Three) Times a Day As Needed for Cough.    Prescription given on gabapentin 800 mg 1 3 times a day #90 with 5 refills, discussed abuse potential this medication.  Jeramy reviewed, appropriate.  Follow-up in 6 months for recheck.

## 2018-01-18 ENCOUNTER — TELEPHONE (OUTPATIENT)
Dept: FAMILY MEDICINE CLINIC | Facility: CLINIC | Age: 48
End: 2018-01-18

## 2018-01-18 NOTE — TELEPHONE ENCOUNTER
----- Message from Lashonda Mosher sent at 1/18/2018  2:37 PM EST -----  Contact: [facility]/[address] [place], [place].-NN-[phone]  PT. SEE'S ARMINDA.  NEEDING A CLARIFICATION ON: FLUOXETINE; DOSAGE ISSUES.  FABRIZIO CAN BE REACHED @ [facility] #.

## 2018-01-18 NOTE — TELEPHONE ENCOUNTER
Spoke with FABRIZIO, pt was taking 3 capsules daily not 1. I advised FABRIZIO to change Rx and he verbalized understanding, thanked me and we ended the call.

## 2018-05-22 ENCOUNTER — OFFICE VISIT (OUTPATIENT)
Dept: FAMILY MEDICINE CLINIC | Facility: CLINIC | Age: 48
End: 2018-05-22

## 2018-05-22 VITALS
HEIGHT: 62 IN | SYSTOLIC BLOOD PRESSURE: 138 MMHG | HEART RATE: 80 BPM | BODY MASS INDEX: 27.6 KG/M2 | DIASTOLIC BLOOD PRESSURE: 82 MMHG | OXYGEN SATURATION: 99 % | WEIGHT: 150 LBS | TEMPERATURE: 98.4 F

## 2018-05-22 DIAGNOSIS — R19.7 DIARRHEA OF PRESUMED INFECTIOUS ORIGIN: Primary | ICD-10-CM

## 2018-05-22 PROCEDURE — 99213 OFFICE O/P EST LOW 20 MIN: CPT | Performed by: PHYSICIAN ASSISTANT

## 2018-05-22 RX ORDER — DICYCLOMINE HCL 20 MG
20 TABLET ORAL EVERY 6 HOURS
Qty: 20 TABLET | Refills: 1 | Status: SHIPPED | OUTPATIENT
Start: 2018-05-22 | End: 2018-07-31

## 2018-05-22 RX ORDER — BUPRENORPHINE HYDROCHLORIDE AND NALOXONE HYDROCHLORIDE DIHYDRATE 8; 2 MG/1; MG/1
TABLET SUBLINGUAL
COMMUNITY
Start: 2018-05-16 | End: 2018-07-31

## 2018-05-22 RX ORDER — ONDANSETRON 8 MG/1
8 TABLET, ORALLY DISINTEGRATING ORAL EVERY 8 HOURS PRN
Qty: 15 TABLET | Refills: 1 | Status: SHIPPED | OUTPATIENT
Start: 2018-05-22 | End: 2019-03-06 | Stop reason: SDUPTHER

## 2018-05-22 RX ORDER — METRONIDAZOLE 500 MG/1
500 TABLET ORAL 3 TIMES DAILY
Qty: 21 TABLET | Refills: 0 | Status: SHIPPED | OUTPATIENT
Start: 2018-05-22 | End: 2018-07-31

## 2018-05-22 NOTE — PROGRESS NOTES
Subjective   Luz Elena Ballard is a 48 y.o. female  Diarrhea (ongoing diarrhea x3 days ) and Bloated (abdominal bloating x3 days )      History of Present Illness  Patient comes in today for evaluation of ongoing symptoms of diarrhea, abdominal bloating and nausea with decreased appetite for the past 3 days.  She states that she's been belching a lot more with a foul odor to it and having a lot of gas without motor.  She states she is having loose bowel movements at least 4 times a day and having to get up at night to have bowel movements for the past 3 days.  No vomiting has occurred.  No recent travel, no recent antibiotic usage.  She states she has a history of GERD and is on Prilosec.  Her GERD has been stable until recently with symptoms worsening the past 3 days.  She has not seen any blood in her stools or black stools.  She states she has tried Pepto-Bismol which has changed the color of her stool somewhat, this has not helped her symptoms of diarrhea nor bloating.  The following portions of the patient's history were reviewed and updated as appropriate: allergies, current medications, past social history and problem list    Review of Systems   Constitutional: Positive for appetite change. Negative for chills, fever and unexpected weight change.   Respiratory: Negative for cough, chest tightness and shortness of breath.    Cardiovascular: Negative for chest pain.   Gastrointestinal: Positive for abdominal distention, diarrhea and nausea. Negative for abdominal pain, anal bleeding, blood in stool, constipation, rectal pain and vomiting.   Genitourinary: Negative.  Negative for difficulty urinating and dysuria.   Musculoskeletal: Negative for back pain.   Skin: Negative.  Negative for color change and rash.   Allergic/Immunologic: Negative for food allergies.       Objective     Vitals:    05/22/18 1123   BP: 138/82   Pulse: 80   Temp: 98.4 °F (36.9 °C)   SpO2: 99%       Physical Exam   Constitutional: She is  oriented to person, place, and time. She appears well-developed and well-nourished.   HENT:   Mouth/Throat: Oropharynx is clear and moist.   Eyes: Conjunctivae are normal. No scleral icterus.   Cardiovascular: Normal rate and regular rhythm.    Pulmonary/Chest: Effort normal and breath sounds normal.   Abdominal: Soft. Bowel sounds are normal. She exhibits no distension and no mass. There is no tenderness. There is no rebound and no guarding. No hernia.   Neurological: She is alert and oriented to person, place, and time.   Skin: Skin is warm and dry. No rash noted. No erythema. No pallor.   Psychiatric: She has a normal mood and affect. Her behavior is normal.   Nursing note and vitals reviewed.      Assessment/Plan     Diagnoses and all orders for this visit:    Diarrhea of presumed infectious origin  -     metroNIDAZOLE (FLAGYL) 500 MG tablet; Take 1 tablet by mouth 3 (Three) Times a Day.  -     ondansetron ODT (ZOFRAN-ODT) 8 MG disintegrating tablet; Take 1 tablet by mouth Every 8 (Eight) Hours As Needed for Nausea or Vomiting.  -     dicyclomine (BENTYL) 20 MG tablet; Take 1 tablet by mouth Every 6 (Six) Hours. As needed for bloating, cramping or diarrhea    Other orders  -     buprenorphine-naloxone (SUBOXONE) 8-2 MG per SL tablet;

## 2018-05-29 ENCOUNTER — TELEPHONE (OUTPATIENT)
Dept: FAMILY MEDICINE CLINIC | Facility: CLINIC | Age: 48
End: 2018-05-29

## 2018-05-29 RX ORDER — FLUCONAZOLE 150 MG/1
150 TABLET ORAL ONCE
Qty: 1 TABLET | Refills: 1 | Status: SHIPPED | OUTPATIENT
Start: 2018-05-29 | End: 2018-05-29

## 2018-05-29 RX ORDER — FLUCONAZOLE 150 MG/1
150 TABLET ORAL ONCE
Qty: 1 TABLET | Refills: 0 | Status: SHIPPED | OUTPATIENT
Start: 2018-05-29 | End: 2018-05-29 | Stop reason: SDUPTHER

## 2018-05-29 NOTE — TELEPHONE ENCOUNTER
Patient is currently on flagyl and has a thrush in mouth all weekend, She is requesting a medication to be called into her pharm. She also had oral sex with her boyfriend over the weekend and wants to know if he needs to be seen by [facility]

## 2018-05-29 NOTE — TELEPHONE ENCOUNTER
----- Message from Lizet Chávez sent at 5/29/2018  2:44 PM EDT -----  Contact: PATIENT  SHE SPOKE WITH YOU THIS MORNING ABOUT GETTING SOMETHING CALLED IN FOR THRUSH, SAID TO MAKE SURE YOU CALL BACK ON HER CELL [phone] (M)

## 2018-05-29 NOTE — TELEPHONE ENCOUNTER
Okay, I understand now thank you for the clarification.  No he should not contract yeast in this manner.  Call in Diflucan 150×1 with 1 refill for her.

## 2018-05-29 NOTE — TELEPHONE ENCOUNTER
I am not completely clear on some of the details of this message.  I am not sure if this undetermined rash in her mouth developed after she performed oral sex on her boyfriend?  I am not sure the issue of why she is concerned about the boyfriend possibly needing to be treated, for what?

## 2018-05-29 NOTE — TELEPHONE ENCOUNTER
She was prescribed Flagyl, she thinks this is where the thrush originated from. She wants to know if her boyfriend will get any kind of yeast infection from this

## 2018-05-29 NOTE — TELEPHONE ENCOUNTER
She can stop the Flagyl entirely if diarrhea has resolved.  If she is still having excessive diarrhea I need to know then we need to do further testing.

## 2018-05-30 ENCOUNTER — TELEPHONE (OUTPATIENT)
Dept: FAMILY MEDICINE CLINIC | Facility: CLINIC | Age: 48
End: 2018-05-30

## 2018-05-30 NOTE — TELEPHONE ENCOUNTER
----- Message from Pamela Verde sent at 5/30/2018  9:38 AM EDT -----  Contact: PT  RETURNING [facility] CALL [phone]

## 2018-05-31 ENCOUNTER — TELEPHONE (OUTPATIENT)
Dept: FAMILY MEDICINE CLINIC | Facility: CLINIC | Age: 48
End: 2018-05-31

## 2018-05-31 NOTE — TELEPHONE ENCOUNTER
----- Message from Pamela Verde sent at 5/31/2018  9:15 AM EDT -----  Contact: PT  RETURNING TENISHA'S CALL [phone]

## 2018-07-10 RX ORDER — GABAPENTIN 800 MG/1
TABLET ORAL
Qty: 90 TABLET | Refills: 5 | OUTPATIENT
Start: 2018-07-10

## 2018-07-26 RX ORDER — GABAPENTIN 800 MG/1
TABLET ORAL
Qty: 90 TABLET | Refills: 5 | Status: CANCELLED | OUTPATIENT
Start: 2018-07-26

## 2018-07-26 NOTE — TELEPHONE ENCOUNTER
----- Message from Lizet Chávez sent at 7/26/2018  2:29 PM EDT -----  Contact: PATIENT  WANTS TO KNOW IF SHE CAN GET ENOUGH MEDICATION CALLED IN TO DUE TILL HER APPOINTMENT ON THE [address].    SHE HAD TO CANCEL THE APPOINTMENT FOR TOMORROW BECAUSE OF ANOTHER APPOINTMENT SHE HAS AND CAN' CHANGE.    gabapentin (NEURONTIN) 800 MG tablet 90 tablet     Sig - Route: Take 1 tablet by mouth 3 (Three) Times a Day. - Oral   Class: Phone In   Pharmacy     [facility] - [phone] [facility] [phone]

## 2018-07-27 RX ORDER — GABAPENTIN 800 MG/1
800 TABLET ORAL 3 TIMES DAILY
Qty: 30 TABLET | Refills: 0 | OUTPATIENT
Start: 2018-07-27 | End: 2018-09-24 | Stop reason: HOSPADM

## 2018-07-27 NOTE — TELEPHONE ENCOUNTER
10 day supply called into [facility], LVM for patient to contact her pharm    FWD to Dr ZEPEDA to sign off.

## 2018-07-31 ENCOUNTER — OFFICE VISIT (OUTPATIENT)
Dept: FAMILY MEDICINE CLINIC | Facility: CLINIC | Age: 48
End: 2018-07-31

## 2018-07-31 ENCOUNTER — LAB (OUTPATIENT)
Dept: LAB | Facility: HOSPITAL | Age: 48
End: 2018-07-31

## 2018-07-31 VITALS
SYSTOLIC BLOOD PRESSURE: 132 MMHG | HEART RATE: 80 BPM | HEIGHT: 62 IN | BODY MASS INDEX: 28.71 KG/M2 | TEMPERATURE: 97.9 F | WEIGHT: 156 LBS | DIASTOLIC BLOOD PRESSURE: 84 MMHG

## 2018-07-31 DIAGNOSIS — R76.8 HEPATITIS C ANTIBODY TEST POSITIVE: ICD-10-CM

## 2018-07-31 DIAGNOSIS — R16.0 HEPATOMEGALY: ICD-10-CM

## 2018-07-31 DIAGNOSIS — M54.41 CHRONIC MIDLINE LOW BACK PAIN WITH RIGHT-SIDED SCIATICA: ICD-10-CM

## 2018-07-31 DIAGNOSIS — Z00.00 GENERAL MEDICAL EXAM: Primary | ICD-10-CM

## 2018-07-31 DIAGNOSIS — Z12.39 BREAST CANCER SCREENING: ICD-10-CM

## 2018-07-31 DIAGNOSIS — I10 ESSENTIAL HYPERTENSION: ICD-10-CM

## 2018-07-31 DIAGNOSIS — G89.29 CHRONIC MIDLINE LOW BACK PAIN WITH RIGHT-SIDED SCIATICA: ICD-10-CM

## 2018-07-31 LAB
ALBUMIN SERPL-MCNC: 3.97 G/DL (ref 3.2–4.8)
ALBUMIN/GLOB SERPL: 1.1 G/DL (ref 1.5–2.5)
ALP SERPL-CCNC: 136 U/L (ref 25–100)
ALT SERPL W P-5'-P-CCNC: 60 U/L (ref 7–40)
ANION GAP SERPL CALCULATED.3IONS-SCNC: 4 MMOL/L (ref 3–11)
AST SERPL-CCNC: 52 U/L (ref 0–33)
BASOPHILS # BLD AUTO: 0.02 10*3/MM3 (ref 0–0.2)
BASOPHILS NFR BLD AUTO: 0.3 % (ref 0–1)
BILIRUB CONJ SERPL-MCNC: 0.2 MG/DL (ref 0–0.2)
BILIRUB SERPL-MCNC: 0.8 MG/DL (ref 0.3–1.2)
BUN BLD-MCNC: 8 MG/DL (ref 9–23)
BUN/CREAT SERPL: 9.3 (ref 7–25)
CALCIUM SPEC-SCNC: 9.1 MG/DL (ref 8.7–10.4)
CHLORIDE SERPL-SCNC: 103 MMOL/L (ref 99–109)
CO2 SERPL-SCNC: 32 MMOL/L (ref 20–31)
CREAT BLD-MCNC: 0.86 MG/DL (ref 0.6–1.3)
DEPRECATED RDW RBC AUTO: 44.2 FL (ref 37–54)
EOSINOPHIL # BLD AUTO: 0.12 10*3/MM3 (ref 0–0.3)
EOSINOPHIL NFR BLD AUTO: 1.6 % (ref 0–3)
ERYTHROCYTE [DISTWIDTH] IN BLOOD BY AUTOMATED COUNT: 12.9 % (ref 11.3–14.5)
GFR SERPL CREATININE-BSD FRML MDRD: 71 ML/MIN/1.73
GLOBULIN UR ELPH-MCNC: 3.7 GM/DL
GLUCOSE BLD-MCNC: 93 MG/DL (ref 70–100)
HAV IGM SERPL QL IA: ABNORMAL
HBV CORE IGM SERPL QL IA: ABNORMAL
HBV SURFACE AG SERPL QL IA: ABNORMAL
HCT VFR BLD AUTO: 42.5 % (ref 34.5–44)
HCV AB SER DONR QL: REACTIVE
HGB BLD-MCNC: 13.7 G/DL (ref 11.5–15.5)
IMM GRANULOCYTES # BLD: 0.02 10*3/MM3 (ref 0–0.03)
IMM GRANULOCYTES NFR BLD: 0.3 % (ref 0–0.6)
LYMPHOCYTES # BLD AUTO: 2.88 10*3/MM3 (ref 0.6–4.8)
LYMPHOCYTES NFR BLD AUTO: 39.3 % (ref 24–44)
MCH RBC QN AUTO: 30 PG (ref 27–31)
MCHC RBC AUTO-ENTMCNC: 32.2 G/DL (ref 32–36)
MCV RBC AUTO: 93.2 FL (ref 80–99)
MONOCYTES # BLD AUTO: 0.49 10*3/MM3 (ref 0–1)
MONOCYTES NFR BLD AUTO: 6.7 % (ref 0–12)
NEUTROPHILS # BLD AUTO: 3.8 10*3/MM3 (ref 1.5–8.3)
NEUTROPHILS NFR BLD AUTO: 51.8 % (ref 41–71)
PLATELET # BLD AUTO: 205 10*3/MM3 (ref 150–450)
PMV BLD AUTO: 10.9 FL (ref 6–12)
POTASSIUM BLD-SCNC: 3.9 MMOL/L (ref 3.5–5.5)
PROT SERPL-MCNC: 7.7 G/DL (ref 5.7–8.2)
RBC # BLD AUTO: 4.56 10*6/MM3 (ref 3.89–5.14)
SODIUM BLD-SCNC: 139 MMOL/L (ref 132–146)
WBC NRBC COR # BLD: 7.33 10*3/MM3 (ref 3.5–10.8)

## 2018-07-31 PROCEDURE — 82248 BILIRUBIN DIRECT: CPT | Performed by: PHYSICIAN ASSISTANT

## 2018-07-31 PROCEDURE — 85007 BL SMEAR W/DIFF WBC COUNT: CPT

## 2018-07-31 PROCEDURE — 36415 COLL VENOUS BLD VENIPUNCTURE: CPT | Performed by: PHYSICIAN ASSISTANT

## 2018-07-31 PROCEDURE — 85025 COMPLETE CBC W/AUTO DIFF WBC: CPT

## 2018-07-31 PROCEDURE — 87522 HEPATITIS C REVRS TRNSCRPJ: CPT | Performed by: PHYSICIAN ASSISTANT

## 2018-07-31 PROCEDURE — 80053 COMPREHEN METABOLIC PANEL: CPT

## 2018-07-31 PROCEDURE — 99396 PREV VISIT EST AGE 40-64: CPT | Performed by: PHYSICIAN ASSISTANT

## 2018-07-31 PROCEDURE — 80074 ACUTE HEPATITIS PANEL: CPT | Performed by: PHYSICIAN ASSISTANT

## 2018-07-31 RX ORDER — PROPRANOLOL HYDROCHLORIDE 10 MG/1
TABLET ORAL
Refills: 0 | COMMUNITY
Start: 2018-06-08 | End: 2018-09-24 | Stop reason: HOSPADM

## 2018-07-31 RX ORDER — DOXEPIN HYDROCHLORIDE 25 MG/1
CAPSULE ORAL
Refills: 0 | COMMUNITY
Start: 2018-06-08 | End: 2018-09-24 | Stop reason: HOSPADM

## 2018-07-31 RX ORDER — LISINOPRIL 20 MG/1
20 TABLET ORAL DAILY
Qty: 30 TABLET | Refills: 6 | Status: SHIPPED | OUTPATIENT
Start: 2018-07-31 | End: 2019-04-14 | Stop reason: SDUPTHER

## 2018-07-31 RX ORDER — FLUOXETINE HYDROCHLORIDE 40 MG/1
80 CAPSULE ORAL DAILY
Refills: 0 | COMMUNITY
Start: 2018-06-08 | End: 2018-09-04 | Stop reason: SDUPTHER

## 2018-07-31 NOTE — PROGRESS NOTES
Subjective   Luz Elena Ballard is a 48 y.o. female  Hypertension (Follow up on blood pressure, req refill on bp med ); Back Pain (Follow up back pain req refill on Gabapentin ); and Annual Exam      History of Present Illness  Patient presents today for a preventive medical visit.  Patient is here to determine screening labs and tests that are due and to determine immunization status as well.  Patient will be counseled regarding preventative medicine issues such as regular exercise and  healthy diet as well.    The following portions of the patient's history were reviewed and updated as appropriate: allergies, current medications, past social history and problem list    Review of Systems   Constitutional: Negative.    HENT: Negative.    Eyes: Negative.    Respiratory: Positive for shortness of breath ( At times with exertion due to fullness at abdomen).    Cardiovascular: Negative.    Gastrointestinal: Positive for abdominal distention and abdominal pain.        Patient states she's been noticing increased fullness in her right upper quadrant of her abdomen and mid upper abdomen over the past 3 months unrelated to eating or bowel movements.  She states it feels uncomfortable and seems to make it difficult to take a deep breath at times.   Endocrine: Negative.    Genitourinary: Negative.    Musculoskeletal: Positive for back pain. Negative for arthralgias, gait problem and myalgias.        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   Skin: Negative.    Allergic/Immunologic: Negative.    Neurological: Negative.  Negative for dizziness, tremors, weakness and numbness.   Hematological: Negative.    Psychiatric/Behavioral: Negative.    All other systems reviewed and are negative.      Objective     Vitals:    07/31/18 1208   BP: 132/84    Pulse: 80   Temp: 97.9 °F (36.6 °C)       Physical Exam   Constitutional: She is oriented to person, place, and time. She appears well-developed and well-nourished. No distress.   HENT:   Head: Normocephalic and atraumatic.   Right Ear: External ear normal.   Left Ear: External ear normal.   Nose: Nose normal.   Mouth/Throat: Oropharynx is clear and moist.   Eyes: Pupils are equal, round, and reactive to light. Conjunctivae and EOM are normal.   Neck: Normal range of motion. Neck supple. No JVD present. Carotid bruit is not present. No thyromegaly present.   Cardiovascular: Normal rate, regular rhythm, normal heart sounds and intact distal pulses.    No murmur heard.  Pulmonary/Chest: Effort normal and breath sounds normal. No respiratory distress.   Abdominal: Soft. Bowel sounds are normal. She exhibits no distension, no pulsatile liver, no fluid wave, no ascites, no pulsatile midline mass and no mass. There is hepatomegaly. There is no splenomegaly. There is tenderness. There is no rebound and no guarding. No hernia.   Musculoskeletal: She exhibits no edema.        Lumbar back: She exhibits decreased range of motion, tenderness, bony tenderness and pain. She exhibits no swelling, no deformity and no spasm.   Lymphadenopathy:     She has no cervical adenopathy.   Neurological: She is alert and oriented to person, place, and time. She has normal reflexes. No cranial nerve deficit.   Skin: Skin is warm and dry. No rash noted. She is not diaphoretic. No erythema.   Psychiatric: She has a normal mood and affect. Her behavior is normal.   Nursing note and vitals reviewed.      Assessment/Plan     Diagnoses and all orders for this visit:    General medical exam    Hepatomegaly  -     US Liver; Future  -     Comprehensive Metabolic Panel; Future  -     CBC & Differential; Future  -     Cancel: Hepatic Function Panel  -     Hepatitis Panel, Acute  -     Bilirubin, Direct; Future  -     Bilirubin, Direct    Essential  hypertension  -     lisinopril (PRINIVIL,ZESTRIL) 20 MG tablet; Take 1 tablet by mouth Daily.  -     Comprehensive Metabolic Panel; Future  -     CBC & Differential; Future  -     Cancel: Hepatic Function Panel  -     Bilirubin, Direct; Future  -     Bilirubin, Direct    Breast cancer screening  -     Mammo Screening Digital Tomosynthesis Bilateral With CAD; Future    Hepatitis C antibody test positive    Chronic midline low back pain with right-sided sciatica    Other orders  -     doxepin (SINEquan) 25 MG capsule; take 1 to 2 capsules by mouth at bedtime  -     FLUoxetine (PROzac) 40 MG capsule; Take 80 mg by mouth Daily. Take 80 mg by mouth Daily.  -     propranolol (INDERAL) 10 MG tablet; take 1 tablet by mouth four times a day if needed for anxiety    Refilled gabapentin 800 mg 1 3 times a day #90 with 2 refills discussed abuse potential this medication, patient will follow-up in 3 months for recheck, Gucci reviewed, appropriate.  She is no longer on Suboxone.  Check studies as noted above, I will contact patient after I receive reports and review them on ultrasound and labs.

## 2018-08-02 LAB
HCV RNA SERPL NAA+PROBE-ACNC: NORMAL IU/ML
HCV RNA SERPL NAA+PROBE-ACNC: NORMAL IU/ML
HCV RNA SERPL NAA+PROBE-LOG IU: 7.04 LOG10 IU/ML
TEST INFORMATION: NORMAL

## 2018-08-08 ENCOUNTER — APPOINTMENT (OUTPATIENT)
Dept: MAMMOGRAPHY | Facility: HOSPITAL | Age: 48
End: 2018-08-08

## 2018-08-17 ENCOUNTER — HOSPITAL ENCOUNTER (OUTPATIENT)
Dept: MAMMOGRAPHY | Facility: HOSPITAL | Age: 48
Discharge: HOME | End: 2018-08-17

## 2018-08-17 ENCOUNTER — HOSPITAL ENCOUNTER (OUTPATIENT)
Dept: ULTRASOUND IMAGING | Facility: HOSPITAL | Age: 48
Discharge: HOME | End: 2018-08-17
Admitting: PHYSICIAN ASSISTANT

## 2018-08-17 DIAGNOSIS — Z12.39 BREAST CANCER SCREENING: ICD-10-CM

## 2018-08-17 DIAGNOSIS — R16.0 HEPATOMEGALY: ICD-10-CM

## 2018-08-17 PROCEDURE — 77063 BREAST TOMOSYNTHESIS BI: CPT | Performed by: RADIOLOGY

## 2018-08-17 PROCEDURE — 76705 ECHO EXAM OF ABDOMEN: CPT

## 2018-08-17 PROCEDURE — 77067 SCR MAMMO BI INCL CAD: CPT

## 2018-08-17 PROCEDURE — 77063 BREAST TOMOSYNTHESIS BI: CPT

## 2018-08-17 PROCEDURE — 77067 SCR MAMMO BI INCL CAD: CPT | Performed by: RADIOLOGY

## 2018-08-20 ENCOUNTER — TRANSCRIBE ORDERS (OUTPATIENT)
Dept: FAMILY MEDICINE CLINIC | Facility: CLINIC | Age: 48
End: 2018-08-20

## 2018-08-20 ENCOUNTER — TELEPHONE (OUTPATIENT)
Dept: FAMILY MEDICINE CLINIC | Facility: CLINIC | Age: 48
End: 2018-08-20

## 2018-08-20 DIAGNOSIS — K76.0 FATTY LIVER: Primary | ICD-10-CM

## 2018-08-20 NOTE — TELEPHONE ENCOUNTER
----- Message from Adenike Meyer sent at 8/20/2018  1:14 PM EDT -----  Please call patient back regarding her lab results?  ThanksAdenike..

## 2018-09-04 RX ORDER — FLUOXETINE HYDROCHLORIDE 40 MG/1
80 CAPSULE ORAL DAILY
Qty: 60 CAPSULE | Refills: 3 | Status: SHIPPED | OUTPATIENT
Start: 2018-09-04 | End: 2018-12-24 | Stop reason: SDUPTHER

## 2018-09-04 NOTE — TELEPHONE ENCOUNTER
----- Message from Lashonda Mosher sent at 9/4/2018  8:41 AM EDT -----  Contact: PT.  PT. SEE'S RAVIN.  PT. IS NEEDING A REFILL ON: FLUoxetine (PROzac) 40 MG capsule  0 6/8/2018    Sig - Route: Take 80 mg by mouth Daily. Take 80 mg by mouth Daily. - Oral     #PSY. HAS UPPED THIS TO 40 MG., TAKEN 2/DAY.#    RX=[facility] - [phone]  - [phone] (Phone)  [phone] (Fax)    PT. CAN BE REACHED @ CELL PHONE #: [phone].

## 2018-09-17 ENCOUNTER — APPOINTMENT (OUTPATIENT)
Dept: MAMMOGRAPHY | Facility: HOSPITAL | Age: 48
End: 2018-09-17

## 2018-09-24 ENCOUNTER — LAB (OUTPATIENT)
Dept: LAB | Facility: HOSPITAL | Age: 48
End: 2018-09-24

## 2018-09-24 ENCOUNTER — OFFICE VISIT (OUTPATIENT)
Dept: GASTROENTEROLOGY | Facility: CLINIC | Age: 48
End: 2018-09-24

## 2018-09-24 VITALS
TEMPERATURE: 97.2 F | WEIGHT: 158.4 LBS | OXYGEN SATURATION: 98 % | SYSTOLIC BLOOD PRESSURE: 128 MMHG | BODY MASS INDEX: 29.15 KG/M2 | DIASTOLIC BLOOD PRESSURE: 80 MMHG | HEIGHT: 62 IN | HEART RATE: 70 BPM

## 2018-09-24 DIAGNOSIS — B18.2 HEP C W/O COMA, CHRONIC (HCC): ICD-10-CM

## 2018-09-24 DIAGNOSIS — R74.8 LIVER ENZYME ELEVATION: ICD-10-CM

## 2018-09-24 DIAGNOSIS — Z86.19: ICD-10-CM

## 2018-09-24 DIAGNOSIS — F19.90 IV DRUG USER: ICD-10-CM

## 2018-09-24 DIAGNOSIS — Z72.0 CURRENT TOBACCO USE: ICD-10-CM

## 2018-09-24 DIAGNOSIS — B18.2 HEP C W/O COMA, CHRONIC (HCC): Primary | ICD-10-CM

## 2018-09-24 DIAGNOSIS — Z90.49 HISTORY OF CHOLECYSTECTOMY: ICD-10-CM

## 2018-09-24 LAB
HBV SURFACE AB SER RIA-ACNC: REACTIVE
INR PPP: 1.03 (ref 0.91–1.09)
PROTHROMBIN TIME: 10.8 SECONDS (ref 9.6–11.5)

## 2018-09-24 PROCEDURE — 86708 HEPATITIS A ANTIBODY: CPT

## 2018-09-24 PROCEDURE — 82977 ASSAY OF GGT: CPT

## 2018-09-24 PROCEDURE — 85610 PROTHROMBIN TIME: CPT

## 2018-09-24 PROCEDURE — 87040 BLOOD CULTURE FOR BACTERIA: CPT

## 2018-09-24 PROCEDURE — 82247 BILIRUBIN TOTAL: CPT

## 2018-09-24 PROCEDURE — 86706 HEP B SURFACE ANTIBODY: CPT

## 2018-09-24 PROCEDURE — 99244 OFF/OP CNSLTJ NEW/EST MOD 40: CPT | Performed by: NURSE PRACTITIONER

## 2018-09-24 PROCEDURE — 36415 COLL VENOUS BLD VENIPUNCTURE: CPT

## 2018-09-24 PROCEDURE — 86704 HEP B CORE ANTIBODY TOTAL: CPT

## 2018-09-24 PROCEDURE — 83883 ASSAY NEPHELOMETRY NOT SPEC: CPT

## 2018-09-24 PROCEDURE — 82172 ASSAY OF APOLIPOPROTEIN: CPT

## 2018-09-24 PROCEDURE — 83010 ASSAY OF HAPTOGLOBIN QUANT: CPT

## 2018-09-24 PROCEDURE — 84460 ALANINE AMINO (ALT) (SGPT): CPT

## 2018-09-24 PROCEDURE — 87902 NFCT AGT GNTYP ALYS HEP C: CPT

## 2018-09-24 NOTE — PROGRESS NOTES
GASTROENTEROLOGY OUTPATIENT NEW PATIENT NOTE  Patient: MARK ELIAS : 1970  Date of Service: 2018  Dear , Toña HERNANDEZ, PA-C   Thank you for your referral of this patient for evaluation of hep c and fatty liver  CC: fatty liver and Hepatitis (Hep C)  Assessment/Plan                                             ASSESSMENT & PLANS     Hep C w/o coma, chronic (CMS/HCC)  Liver enzyme elevation  History of cholecystectomy over 10 yrs ago  -     Hepatitis B Core Antibody, Total; Future  -     Hepatitis C Genotype; Future  -     Protime-INR; Future  -     Hepatitis A Antibody, Total; Future  -     Hepatitis B Surface Antibody; Future  -     HCV FibroSURE; Future    Current active IV drug user  · Pt is counseled on cessation of drug use and follow up w/ Suboxone clinic appointment this Friday  · I counseled pt that I'm unable to tx her Hep C if she continues to use drug and that insurance would not approve for Hep C meds as well.     Current tobacco use    History of infectious disease  -     Blood Culture - Blood,; Future  · Obtain outside records from [facility]  [Addendum: Per outside medical records from [facility], patient has history of Candida glabrata UTI and staphylococcus bacteremia with multiple drug resistance in 2016]    Follow Up:    DISCUSSION: The above plan was delineated in details with patient and all questions and concerns were answered.  Patient is also given contact information.  Patient is to return as scheduled or sooner if new problems arise  Subjective                                                     SUBJECTIVE   History of Present Illness  Ms. Mark Elias is a 48 y.o. female presents to the clinic today for an evaluation of fatty liver and Hepatitis (Hep C)  Dx w/ Hep C 5 yrs ago  Heroin over 10 yrs. Also cocaine and methamphetamine.  Uses about 1gm of heroin per day.  Last use was this morning  Has been trying to go Suboxone clinic.  Multiple  relapse.  Trying again for the 3rd time w/ Suboxone clinic on Friday  Was hospitalized at [facility] for bacteremia and osteomyelitis. Pt is concerned about reinfection and wants to be retested  Works as care giver.    Gained 15 to 18 lbs over the last 2-3 months.  Was told that pt has possible cirrhosis. Pt reports of being scared.   Reports of RUQ abd pain, n/v, early satiety, and green stools    ROS:Review of Systems   Constitutional: Positive for fatigue. Negative for activity change, appetite change, fever and unexpected weight change.   HENT: Negative for hearing loss, trouble swallowing and voice change.    Eyes: Negative for visual disturbance.   Respiratory: Negative for cough, choking, chest tightness and shortness of breath.    Cardiovascular: Negative for chest pain.   Gastrointestinal: Positive for abdominal distention, abdominal pain, diarrhea, nausea and vomiting. Negative for anal bleeding, blood in stool, constipation and rectal pain.   Endocrine: Negative for polydipsia and polyphagia.   Genitourinary: Negative.    Musculoskeletal: Negative for gait problem and joint swelling.   Skin: Negative for color change and rash.   Allergic/Immunologic: Negative for food allergies.   Neurological: Negative for dizziness, seizures and speech difficulty.   Hematological: Negative for adenopathy.   Psychiatric/Behavioral: Negative for confusion.     Subjective   PAST MED HX: Pt  has a past medical history of Arthritis; Basal cell carcinoma; Cellulitis; Cellulitis of finger; Depression; Fatty liver; History of Blocked tear duct; History of migraine headaches; Hypercholesterolemia; Infectious viral hepatitis; IV drug abuse; Obstructive sleep apnea; Sciatica; Sciatica; Skin cancer; and Upper respiratory infection.  PAST SURG HX: Pt  has a past surgical history that includes Breast surgery (2000); Cholecystectomy (2000); Other surgical history; Tubal ligation (1992); Hysterectomy (08/06/2003); and Reduction  mammaplasty (Bilateral, 2002).  FAM HX: family history includes Anxiety disorder in her father; Arthritis in her mother; Depression in her mother and sister; Diabetes in her maternal grandmother; Hypertension in her mother; Suicidality in her father.  SOC HX: Pt  reports that she has been smoking.  She has never used smokeless tobacco. She reports that she uses drugs, including IV and Heroin. She reports that she does not drink alcohol.  Objective                                                           OBJECTIVE   Allergy: Pt is allergic to sulfa antibiotics and vancomycin.  MEDS: •  estradiol (ESTRACE) 2 MG tablet, Take 1 tablet by mouth Daily., Disp: 30 tablet, Rfl: 11  •  FLUoxetine (PROzac) 40 MG capsule, Take 2 capsules by mouth Daily. Take 80 mg by mouth Daily., Disp: 60 capsule, Rfl: 3  •  hydrOXYzine (ATARAX) 10 MG tablet, Take 1 tablet by mouth Every 6 (Six) Hours As Needed for Anxiety., Disp: 30 tablet, Rfl: 11  •  ibuprofen (ADVIL,MOTRIN) 800 MG tablet, Take 1 tablet by mouth Every 8 (Eight) Hours As Needed for Mild Pain ., Disp: 90 tablet, Rfl: 5  •  lisinopril (PRINIVIL,ZESTRIL) 20 MG tablet, Take 1 tablet by mouth Daily., Disp: 30 tablet, Rfl: 6  •  omeprazole (priLOSEC) 20 MG capsule, Take 1 capsule by mouth Daily., Disp: 30 capsule, Rfl: 11  •  ondansetron ODT (ZOFRAN-ODT) 8 MG disintegrating tablet, Take 1 tablet by mouth Every 8 (Eight) Hours As Needed for Nausea or Vomiting., Disp: 15 tablet, Rfl: 1  Lab Results   Component Value Date    WBC 7.33 07/31/2018    WBC 9.13 01/17/2018    WBC 8.13 02/21/2017    EOSABS 0.12 07/31/2018    EOSABS 0.07 01/17/2018    EOSABS 0.02 (L) 02/15/2016    HGB 13.7 07/31/2018    HGB 13.8 01/17/2018    HGB 14.7 02/21/2017    HCT 42.5 07/31/2018    HCT 40.6 01/17/2018    HCT 43.8 02/21/2017    MCV 93.2 07/31/2018    MCV 90.4 01/17/2018    MCV 91.1 02/21/2017    MCHC 32.2 07/31/2018    MCHC 34.0 01/17/2018    MCHC 33.6 02/21/2017     07/31/2018      01/17/2018     02/21/2017     Lab Results   Component Value Date     07/31/2018    K 3.9 07/31/2018     07/31/2018    CO2 32.0 (H) 07/31/2018    BUN 8 (L) 07/31/2018    CREATININE 0.86 07/31/2018    GLUCOSE 93 07/31/2018    CALCIUM 9.1 07/31/2018    ANIONGAP 4.0 07/31/2018     HepC Viral Load  Lab Results   Component Value Date    HEPATITISC See Final Results 07/31/2018    HEPATITISC See Final Results 06/10/2014    HCVRNAIU [id number] 07/31/2018    HCVRNAIU [id number] 06/10/2014    TZUVJC87 7.041 07/31/2018    UOAPKG28 7.035 06/10/2014     HIV  Lab Results   Component Value Date    LXEVUQB80 NonReactive 06/10/2014     Lab Results   Component Value Date    AST 52 (H) 07/31/2018    AST 51 (H) 01/17/2018    AST 52 (H) 02/21/2017    ALT 60 (H) 07/31/2018    ALT 51 (H) 01/17/2018    ALT 52 (H) 02/21/2017    ALKPHOS 136 (H) 07/31/2018    ALKPHOS 116 (H) 01/17/2018    ALKPHOS 114 (H) 02/21/2017    BILITOT 0.8 07/31/2018    BILITOT 0.8 01/17/2018    BILITOT 0.6 02/21/2017    ALBUMIN 3.97 07/31/2018    ALBUMIN 4.00 01/17/2018    ALBUMIN 4.20 02/21/2017     Lab Results   Component Value Date    LIPASE 24 02/15/2016     Lab Results   Component Value Date    TSH 1.043 06/10/2014     Lab Results   Component Value Date    INR 0.85 02/17/2016    INR 0.93 09/28/2015     Lab Results   Component Value Date    HEPAIGM Non-Reactive 07/31/2018    HEPAIGM NonReactive 06/10/2014    HEPBSAG Non-Reactive 07/31/2018    HEPBSAG NonReactive 06/10/2014    HEPBIGMCORE Non-Reactive 07/31/2018    HEPBIGMCORE NonReactive 06/10/2014    HEPCVIRUSABY Reactive (C) 07/31/2018    HEPCVIRUSABY Reactive (A) 06/10/2014    HPYAGSTL Negative 02/12/2016    .1 (H) 02/15/2016    CRP 12.8 (H) 09/27/2015     Lab Results   Component Value Date    PCPUR Negative 05/31/2016    PCPUR Negative 02/15/2016    COCAINEUR Negative 05/31/2016    COCAINEUR Positive (A) 02/15/2016    METAMPSCNUR Negative 05/31/2016    METAMPSCNUR Negative 02/15/2016     AMPHETSCREEN Negative 05/31/2016    AMPHETSCREEN Negative 02/15/2016    LABBENZSCN Positive (A) 05/31/2016    LABBENZSCN Negative 02/15/2016    LABMETHSCN Negative 05/31/2016    LABMETHSCN Negative 02/15/2016    BARBITSCNUR Negative 05/31/2016    BARBITSCNUR Negative 02/15/2016    OXYCODONESCN Negative 05/31/2016    OXYCODONESCN Negative 02/15/2016    PROPOXSCN Negative 05/31/2016    PROPOXSCN Negative 02/15/2016    BUPRENORSCNU Negative 05/31/2016    BUPRENORSCNU Positive (A) 02/15/2016     EXAMINATION: US LIVER-08/17/2018:     FINDINGS:  Limited views of the pancreas are unremarkable.  There is  hepatopetal portal venous flow. Coarsened hepatic echotexture and  increased echogenicity. Postcholecystectomy biliary ectasia noted; CBD  diameter is 7 mm at the liver hilum. The right kidney is grossly  unremarkable.     IMPRESSION:  1. Hepatic parenchymal disease, possibly fatty liver.  2. Evidence of prior cholecystectomy with postcholecystectomy biliary  ectasia.  Wt Readings from Last 5 Encounters:   09/24/18 71.8 kg (158 lb 6.4 oz)   07/31/18 70.8 kg (156 lb)   05/22/18 68 kg (150 lb)   01/17/18 68.5 kg (151 lb)   10/13/17 67.1 kg (148 lb)   body mass index is 28.97 kg/m².,Temp: 97.2 °F (36.2 °C),BP: 128/80,Heart Rate: 70   Physical Exam  General Well developed; well nourished; no acute distress.   ENT Oral mucosa pink and moist without thrush or lesions.    Neck Neck supple; trachea midline. No thyromegaly   Resp CTA; no rhonchi, rales, or wheezes.  Respiration effort normal  CV RRR; normal S1, S2; no M/R/G. No lower extremity edema  GI Abd soft, TTP in RUQ, ND, normal active bowel sounds.  No abd hernia  Skin No rash; no lesions; no bruises.  Skin turgor normal  Musc No clubbing; no cyanosis.  Gait steady  Psych Oriented to time, place, and person.  Appropriate affect  Thank you kindly for allowing me to be part of this patient’s care.    Pt care team: Shanna ALMONTE & Scotty Queen,  JACKSON  09/24/188:53 AM  [facility]--Gastroenterology  [phone]    CC: , Toña HERNANDEZ, PA-C  [address] [address] / [place] [id number] FAX:[phone]

## 2018-09-25 LAB
HAV AB SER QL IA: POSITIVE
HBV CORE AB SER DONR QL IA: NEGATIVE

## 2018-09-27 LAB
HCV GENTYP SERPL NAA+PROBE: NORMAL
Lab: NORMAL

## 2018-09-29 LAB
A2 MACROGLOB SERPL-MCNC: 260 MG/DL (ref 110–276)
ALT SERPL W P-5'-P-CCNC: 45 IU/L (ref 0–40)
APO A-I SERPL-MCNC: 131 MG/DL (ref 116–209)
BACTERIA SPEC AEROBE CULT: NORMAL
BILIRUB SERPL-MCNC: 0.8 MG/DL (ref 0–1.2)
FIBROSIS SCORING:: ABNORMAL
FIBROSIS STAGE SERPL QL: ABNORMAL
GGT SERPL-CCNC: 205 IU/L (ref 0–60)
HAPTOGLOB SERPL-MCNC: 130 MG/DL (ref 34–200)
HCV AB SER QL: ABNORMAL
LABORATORY COMMENT REPORT: ABNORMAL
LIMITATIONS:: ABNORMAL
LIVER FIBR SCORE SERPL CALC.FIBROSURE: 0.57 (ref 0–0.21)
NECROINFLAMM ACTIVITY SCORING:: ABNORMAL
NECROINFLAMMATORY ACT GRADE SERPL QL: ABNORMAL
NECROINFLAMMATORY ACT SCORE SERPL: 0.34 (ref 0–0.17)

## 2018-11-01 ENCOUNTER — TELEPHONE (OUTPATIENT)
Dept: FAMILY MEDICINE CLINIC | Facility: CLINIC | Age: 48
End: 2018-11-01

## 2018-11-01 NOTE — TELEPHONE ENCOUNTER
Okay to refill for 3 additional months and then patient is due for follow-up in office before further refills can be given

## 2018-11-01 NOTE — TELEPHONE ENCOUNTER
----- Message from Tj Walker sent at 11/1/2018 11:16 AM EDT -----  PT NEEDS A REFILL ON  HER GABAPENTIN, USES [facility] [phone]. PT CALL BACK NUMBER [phone]

## 2018-11-29 ENCOUNTER — TELEPHONE (OUTPATIENT)
Dept: FAMILY MEDICINE CLINIC | Facility: CLINIC | Age: 48
End: 2018-11-29

## 2018-11-29 RX ORDER — CLOTRIMAZOLE 10 MG/1
10 LOZENGE ORAL; TOPICAL
Qty: 25 TABLET | Refills: 0 | Status: SHIPPED | OUTPATIENT
Start: 2018-11-29 | End: 2019-02-26

## 2018-11-29 NOTE — TELEPHONE ENCOUNTER
----- Message from Lashonda Mosher sent at 11/29/2018  1:25 PM EST -----  Contact: PT.  PT. SEE'S RAVIN.  PT. HAS BAD THRUSH; WANTING SOMETHING CALLED IN FOR THIS ISSUE.    RX=[facility].    PT. CAN BE REACHED @ [phone], CELL PHONE #.

## 2018-11-29 NOTE — TELEPHONE ENCOUNTER
Call in Mycelex troches one 5 times a day for 5 days #25 and have her come in to be seen if this continues.

## 2018-12-26 RX ORDER — FLUOXETINE HYDROCHLORIDE 40 MG/1
CAPSULE ORAL
Qty: 60 CAPSULE | Refills: 0 | Status: SHIPPED | OUTPATIENT
Start: 2018-12-26 | End: 2019-02-26 | Stop reason: SDUPTHER

## 2019-01-25 DIAGNOSIS — N95.1 HOT FLASHES, MENOPAUSAL: ICD-10-CM

## 2019-01-25 DIAGNOSIS — I10 ESSENTIAL HYPERTENSION: Chronic | ICD-10-CM

## 2019-01-25 DIAGNOSIS — K21.9 GASTROESOPHAGEAL REFLUX DISEASE WITHOUT ESOPHAGITIS: Chronic | ICD-10-CM

## 2019-01-29 RX ORDER — OMEPRAZOLE 20 MG/1
20 CAPSULE, DELAYED RELEASE ORAL DAILY
Qty: 30 CAPSULE | Refills: 3 | Status: SHIPPED | OUTPATIENT
Start: 2019-01-29 | End: 2019-06-20 | Stop reason: SDUPTHER

## 2019-01-29 RX ORDER — ESTRADIOL 2 MG/1
2 TABLET ORAL DAILY
Qty: 30 TABLET | Refills: 3 | Status: SHIPPED | OUTPATIENT
Start: 2019-01-29 | End: 2019-08-14

## 2019-02-26 ENCOUNTER — OFFICE VISIT (OUTPATIENT)
Dept: FAMILY MEDICINE CLINIC | Facility: CLINIC | Age: 49
End: 2019-02-26

## 2019-02-26 VITALS
BODY MASS INDEX: 28.34 KG/M2 | TEMPERATURE: 98.5 F | DIASTOLIC BLOOD PRESSURE: 84 MMHG | WEIGHT: 154 LBS | SYSTOLIC BLOOD PRESSURE: 132 MMHG | HEART RATE: 78 BPM | HEIGHT: 62 IN | OXYGEN SATURATION: 99 %

## 2019-02-26 DIAGNOSIS — F32.A DEPRESSION, UNSPECIFIED DEPRESSION TYPE: ICD-10-CM

## 2019-02-26 DIAGNOSIS — M54.41 CHRONIC MIDLINE LOW BACK PAIN WITH RIGHT-SIDED SCIATICA: Primary | ICD-10-CM

## 2019-02-26 DIAGNOSIS — G89.29 CHRONIC MIDLINE LOW BACK PAIN WITH RIGHT-SIDED SCIATICA: Primary | ICD-10-CM

## 2019-02-26 PROCEDURE — 99213 OFFICE O/P EST LOW 20 MIN: CPT | Performed by: PHYSICIAN ASSISTANT

## 2019-02-26 RX ORDER — FLUOXETINE HYDROCHLORIDE 40 MG/1
80 CAPSULE ORAL DAILY
Qty: 60 CAPSULE | Refills: 11 | Status: SHIPPED | OUTPATIENT
Start: 2019-02-26 | End: 2019-08-14

## 2019-02-26 RX ORDER — GABAPENTIN 800 MG/1
TABLET ORAL
Refills: 0 | COMMUNITY
Start: 2019-01-29 | End: 2020-05-08 | Stop reason: SDUPTHER

## 2019-02-26 NOTE — PROGRESS NOTES
Subjective   Luz Elena Ballard is a 48 y.o. female  Peripheral Neuropathy (Follow up on neuropathy, req refills on Gabapentin ) and Depression (Follow up on depression, req refills on prozac)      History of Present Illness  Patient comes in today for follow-up on depression as well as peripheral neuropathy in lower extremities associated with a radiculopathy from degenerative disc disease and low back.  She is doing very well lately weight is down, she is off of Suboxone, depression is well controlled on medication.  Pain in lower extremities with neuropathy is stable on gabapentin.  She is due for refills.   The following portions of the patient's history were reviewed and updated as appropriate: allergies, current medications, past social history and problem list    Review of Systems   Constitutional: Negative.  Negative for appetite change and fatigue.   Respiratory: Negative.  Negative for chest tightness and shortness of breath.    Gastrointestinal: Negative.  Negative for abdominal pain, diarrhea and nausea.   Genitourinary: Negative.    Musculoskeletal: Positive for back pain. Negative for arthralgias, gait problem and myalgias.   Neurological: Positive for numbness. Negative for dizziness, tremors, weakness, light-headedness and headaches.   Psychiatric/Behavioral: Positive for dysphoric mood ( Stable on medication). Negative for agitation, behavioral problems, confusion, decreased concentration, sleep disturbance and suicidal ideas. The patient is not nervous/anxious.        Objective     Vitals:    02/26/19 1506   BP: 132/84   Pulse: 78   Temp: 98.5 °F (36.9 °C)   SpO2: 99%       Physical Exam   Constitutional: She is oriented to person, place, and time. She appears well-developed and well-nourished. No distress.   Neck: No thyroid mass and no thyromegaly present.   Cardiovascular: Normal rate, regular rhythm and normal heart sounds.   Pulmonary/Chest: Effort normal and breath sounds normal.   Abdominal:  Soft. Bowel sounds are normal.   Musculoskeletal: She exhibits no tenderness.        Lumbar back: She exhibits decreased range of motion, bony tenderness and pain. She exhibits no swelling, no deformity and no spasm.   Neurological: She is alert and oriented to person, place, and time. She has normal reflexes. She displays normal reflexes. A sensory deficit is present. No cranial nerve deficit. Coordination normal.   Skin: She is not diaphoretic.   Psychiatric: She has a normal mood and affect. Her speech is normal and behavior is normal. Judgment and thought content normal. Her mood appears not anxious. Her affect is not angry and not inappropriate. Cognition and memory are normal. She does not exhibit a depressed mood. She is attentive.   Nursing note and vitals reviewed.      Assessment/Plan     Diagnoses and all orders for this visit:    Chronic midline low back pain with right-sided sciatica    Depression, unspecified depression type    Other orders  -     gabapentin (NEURONTIN) 800 MG tablet; TID  -     FLUoxetine (PROzac) 40 MG capsule; Take 2 capsules by mouth Daily.    Refilled gabapentin 800 mg 1 3 times daily #90 with 5 refills discussed abuse potential medication Gucci reviewed, appropriate, follow-up in office in 6 months for recheck and as needed.

## 2019-03-06 ENCOUNTER — TELEPHONE (OUTPATIENT)
Dept: FAMILY MEDICINE CLINIC | Facility: CLINIC | Age: 49
End: 2019-03-06

## 2019-03-06 DIAGNOSIS — R19.7 DIARRHEA OF PRESUMED INFECTIOUS ORIGIN: ICD-10-CM

## 2019-03-06 RX ORDER — ONDANSETRON 8 MG/1
8 TABLET, ORALLY DISINTEGRATING ORAL EVERY 8 HOURS PRN
Qty: 15 TABLET | Refills: 0 | Status: SHIPPED | OUTPATIENT
Start: 2019-03-06 | End: 2019-03-27 | Stop reason: SDUPTHER

## 2019-03-06 NOTE — TELEPHONE ENCOUNTER
----- Message from Pamela Verde sent at 3/6/2019  1:03 PM EST -----  Contact: PT  Refill REQUEST    ondansetron ODT (ZOFRAN-ODT) 8 MG disintegrating tablet Take 1 tablet by mouth Every 8 (Eight) Hours As Needed for Nausea or Vomiting.     [facility]

## 2019-03-27 DIAGNOSIS — R19.7 DIARRHEA OF PRESUMED INFECTIOUS ORIGIN: ICD-10-CM

## 2019-03-27 NOTE — TELEPHONE ENCOUNTER
----- Message from Pamela Verde sent at 3/27/2019 11:54 AM EDT -----  Contact: PT  REFILLS NEEDED    GABAPENTIN AND ZOFRAN    [facility] [facility] - [phone]  - [phone] (Phone)  [phone] (Fax)

## 2019-03-29 RX ORDER — GABAPENTIN 800 MG/1
TABLET ORAL
Qty: 90 TABLET | Refills: 0 | OUTPATIENT
Start: 2019-03-29

## 2019-03-29 RX ORDER — ONDANSETRON 8 MG/1
TABLET, ORALLY DISINTEGRATING ORAL
Qty: 15 TABLET | Refills: 0 | Status: SHIPPED | OUTPATIENT
Start: 2019-03-29 | End: 2020-01-30 | Stop reason: SDUPTHER

## 2019-03-30 DIAGNOSIS — G89.29 CHRONIC MIDLINE LOW BACK PAIN WITH RIGHT-SIDED SCIATICA: ICD-10-CM

## 2019-03-30 DIAGNOSIS — M54.41 CHRONIC MIDLINE LOW BACK PAIN WITH RIGHT-SIDED SCIATICA: ICD-10-CM

## 2019-03-30 DIAGNOSIS — Z86.69 HISTORY OF MIGRAINE: ICD-10-CM

## 2019-04-01 RX ORDER — IBUPROFEN 800 MG/1
TABLET ORAL
Qty: 90 TABLET | Refills: 5 | OUTPATIENT
Start: 2019-04-01

## 2019-04-14 DIAGNOSIS — I10 ESSENTIAL HYPERTENSION: ICD-10-CM

## 2019-04-15 RX ORDER — LISINOPRIL 20 MG/1
20 TABLET ORAL DAILY
Qty: 30 TABLET | Refills: 1 | Status: SHIPPED | OUTPATIENT
Start: 2019-04-15 | End: 2019-06-20 | Stop reason: SDUPTHER

## 2019-05-01 ENCOUNTER — TELEPHONE (OUTPATIENT)
Dept: GASTROENTEROLOGY | Facility: CLINIC | Age: 49
End: 2019-05-01

## 2019-05-01 DIAGNOSIS — B18.2 CHRONIC HEPATITIS C WITHOUT HEPATIC COMA (HCC): Primary | ICD-10-CM

## 2019-05-01 NOTE — TELEPHONE ENCOUNTER
PT CALLED [facility] REGARDING APPT WITH DEBBIE.   CALLED PT BACK; PT STATES SHE HAD LOST HER INSURANCE IN SEPT AND WAS NOT ABLE TO COME BACK FOR HER F/U;PT HAS RECEIVED HER INSURANCE BACK AND WAS CONCERNED SHE COULD NOT GET IN TO AN APPT UNTIL June. PT WOULD LIKE TO SEE IF DEBBIE WOULD LIKE TO ORDER LABS PRIOR TO APPT; ASK IF DEBBIE COULD F/U A PHONE CALL REGARDING HER LAB RESULTS FROM SEPT. 2018.  ADVISED PT I WOULD ROUTE A MESSAGE TO DEBBIE AND WE WOULD F/U ACCORDINGLY.  PT VOICED UNDERSTANDING

## 2019-05-03 NOTE — TELEPHONE ENCOUNTER
Labs and US ordered.   pls let pt know that US is a special US and has to be done at [facility] location only

## 2019-05-09 ENCOUNTER — HOSPITAL ENCOUNTER (OUTPATIENT)
Dept: ULTRASOUND IMAGING | Facility: HOSPITAL | Age: 49
Discharge: HOME | End: 2019-05-09
Admitting: NURSE PRACTITIONER

## 2019-05-09 DIAGNOSIS — B18.2 CHRONIC HEPATITIS C WITHOUT HEPATIC COMA (HCC): ICD-10-CM

## 2019-05-09 PROCEDURE — 76981 USE PARENCHYMA: CPT

## 2019-05-09 PROCEDURE — 76705 ECHO EXAM OF ABDOMEN: CPT

## 2019-05-13 ENCOUNTER — TELEPHONE (OUTPATIENT)
Dept: GASTROENTEROLOGY | Facility: CLINIC | Age: 49
End: 2019-05-13

## 2019-05-13 NOTE — TELEPHONE ENCOUNTER
CALLED PATIENT BACK. SHE WILL GET LABS DRAWN AT THE [facility] LOCATION.

## 2019-05-16 ENCOUNTER — TELEPHONE (OUTPATIENT)
Dept: GASTROENTEROLOGY | Facility: CLINIC | Age: 49
End: 2019-05-16

## 2019-05-16 ENCOUNTER — TELEPHONE (OUTPATIENT)
Dept: FAMILY MEDICINE CLINIC | Facility: CLINIC | Age: 49
End: 2019-05-16

## 2019-05-16 NOTE — TELEPHONE ENCOUNTER
Patient called to request that a prescription for Diflucan (yeast medication) be called in for her.    2/26/19 Last Visit    [facility] (on file)

## 2019-05-17 RX ORDER — FLUCONAZOLE 150 MG/1
150 TABLET ORAL ONCE
Qty: 1 TABLET | Refills: 0 | Status: SHIPPED | OUTPATIENT
Start: 2019-05-17 | End: 2019-05-17

## 2019-05-30 ENCOUNTER — TELEPHONE (OUTPATIENT)
Dept: GASTROENTEROLOGY | Facility: CLINIC | Age: 49
End: 2019-05-30

## 2019-05-30 NOTE — TELEPHONE ENCOUNTER
PT CALLED M  QUESTIONS REGARDING U/S RESULTS. [facility]

## 2019-06-20 DIAGNOSIS — K21.9 GASTROESOPHAGEAL REFLUX DISEASE WITHOUT ESOPHAGITIS: Chronic | ICD-10-CM

## 2019-06-20 DIAGNOSIS — I10 ESSENTIAL HYPERTENSION: Chronic | ICD-10-CM

## 2019-06-21 RX ORDER — OMEPRAZOLE 20 MG/1
CAPSULE, DELAYED RELEASE ORAL
Qty: 30 CAPSULE | Refills: 3 | Status: SHIPPED | OUTPATIENT
Start: 2019-06-21 | End: 2019-08-14 | Stop reason: SDUPTHER

## 2019-06-21 RX ORDER — LISINOPRIL 20 MG/1
TABLET ORAL
Qty: 30 TABLET | Refills: 3 | Status: SHIPPED | OUTPATIENT
Start: 2019-06-21 | End: 2019-08-14 | Stop reason: SDUPTHER

## 2019-08-14 ENCOUNTER — OFFICE VISIT (OUTPATIENT)
Dept: FAMILY MEDICINE CLINIC | Facility: CLINIC | Age: 49
End: 2019-08-14

## 2019-08-14 VITALS
OXYGEN SATURATION: 99 % | TEMPERATURE: 98.1 F | HEART RATE: 66 BPM | BODY MASS INDEX: 25.95 KG/M2 | SYSTOLIC BLOOD PRESSURE: 124 MMHG | WEIGHT: 141 LBS | HEIGHT: 62 IN | DIASTOLIC BLOOD PRESSURE: 78 MMHG

## 2019-08-14 DIAGNOSIS — K21.9 GASTROESOPHAGEAL REFLUX DISEASE WITHOUT ESOPHAGITIS: Chronic | ICD-10-CM

## 2019-08-14 DIAGNOSIS — G62.9 POLYNEUROPATHY: ICD-10-CM

## 2019-08-14 DIAGNOSIS — F32.A DEPRESSION, UNSPECIFIED DEPRESSION TYPE: Primary | ICD-10-CM

## 2019-08-14 DIAGNOSIS — I10 ESSENTIAL HYPERTENSION: Chronic | ICD-10-CM

## 2019-08-14 DIAGNOSIS — H10.13 ALLERGIC CONJUNCTIVITIS OF BOTH EYES: ICD-10-CM

## 2019-08-14 PROCEDURE — 99214 OFFICE O/P EST MOD 30 MIN: CPT | Performed by: PHYSICIAN ASSISTANT

## 2019-08-14 RX ORDER — DULOXETIN HYDROCHLORIDE 30 MG/1
30 CAPSULE, DELAYED RELEASE ORAL DAILY
Qty: 30 CAPSULE | Refills: 11 | Status: SHIPPED | OUTPATIENT
Start: 2019-08-14 | End: 2019-08-28 | Stop reason: DRUGHIGH

## 2019-08-14 RX ORDER — LISINOPRIL 20 MG/1
20 TABLET ORAL DAILY
Qty: 30 TABLET | Refills: 11 | Status: SHIPPED | OUTPATIENT
Start: 2019-08-14 | End: 2020-08-26

## 2019-08-14 RX ORDER — AZELASTINE HYDROCHLORIDE 0.5 MG/ML
1 SOLUTION/ DROPS OPHTHALMIC DAILY
Qty: 6 ML | Refills: 12 | Status: SHIPPED | OUTPATIENT
Start: 2019-08-14 | End: 2020-10-14

## 2019-08-14 RX ORDER — OMEPRAZOLE 20 MG/1
20 CAPSULE, DELAYED RELEASE ORAL DAILY
Qty: 30 CAPSULE | Refills: 11 | Status: SHIPPED | OUTPATIENT
Start: 2019-08-14 | End: 2019-12-11

## 2019-08-14 NOTE — PROGRESS NOTES
Subjective   Luz Elena Ballard is a 49 y.o. female  Peripheral Neuropathy (Follow up on neuropathy, req refills on Gabapentin)      History of Present Illness  Patient comes in today for follow-up on chronic peripheral neuropathy.  States her neuropathy is currently stable on gabapentin 3 times daily.  She would also like to discuss another new problem which is not well controlled.  She states she stopped her Prozac a couple of months ago because she no longer felt like it was helping her.  She states her depression has worsened since she stopped her Prozac.  She states she does not feel like it was doing a great job controlling her symptoms and she would like to try different medication.  She is not experiencing any homicidal or suicidal ideations.  She states she is having anxiety as well as depression.  She is having trouble sleeping as well.  The following portions of the patient's history were reviewed and updated as appropriate: allergies, current medications, past social history and problem list    Review of Systems   Constitutional: Negative for appetite change and fatigue.   Eyes: Positive for itching. Negative for photophobia, pain, discharge, redness and visual disturbance.   Respiratory: Negative for chest tightness and shortness of breath.    Gastrointestinal: Negative for abdominal pain, diarrhea and nausea.   Neurological: Negative for dizziness, tremors, weakness, light-headedness and headaches.   Psychiatric/Behavioral: Positive for dysphoric mood and sleep disturbance. Negative for agitation, behavioral problems, confusion, decreased concentration and suicidal ideas. The patient is nervous/anxious.        Objective     Vitals:    08/14/19 1051   BP: 124/78   Pulse: 66   Temp: 98.1 °F (36.7 °C)   SpO2: 99%       Physical Exam   Constitutional: She is oriented to person, place, and time. She appears well-developed and well-nourished. No distress.   Eyes:   Slight injection bilateral conjunctivo-, no  drainage noted   Neck: No thyroid mass and no thyromegaly present.   Cardiovascular: Normal rate, regular rhythm and normal heart sounds.   Pulmonary/Chest: Effort normal.   Neurological: She is alert and oriented to person, place, and time. No cranial nerve deficit or sensory deficit. She exhibits normal muscle tone. Coordination normal.   Skin: Skin is warm and dry. She is not diaphoretic.   Psychiatric: Her speech is normal and behavior is normal. Judgment and thought content normal. Her mood appears anxious. Her affect is not angry and not inappropriate. Cognition and memory are normal. She does not exhibit a depressed mood. She is attentive.   Nursing note and vitals reviewed.      Assessment/Plan     Diagnoses and all orders for this visit:    Depression, unspecified depression type    Polyneuropathy    Essential hypertension  -     lisinopril (PRINIVIL,ZESTRIL) 20 MG tablet; Take 1 tablet by mouth Daily.  -     omeprazole (priLOSEC) 20 MG capsule; Take 1 capsule by mouth Daily.    Gastroesophageal reflux disease without esophagitis  -     omeprazole (priLOSEC) 20 MG capsule; Take 1 capsule by mouth Daily.    Allergic conjunctivitis of both eyes    Other orders  -     azelastine (OPTIVAR) 0.05 % ophthalmic solution; Administer 1 drop to both eyes Daily. As needed for allergic eye sx  -     DULoxetine (CYMBALTA) 30 MG capsule; Take 1 capsule by mouth Daily. At bedtime    Refill gabapentin 800 mg 1 3 times daily #90 with 5 refills, discussed abuse potential medication, Gucci reviewed, appropriate, follow-up in 6 months for recheck.  Start on Cymbalta 30 mg nightly, follow-up in 1 month if not improved, follow-up sooner if any adverse effects noted.

## 2019-08-28 ENCOUNTER — TELEPHONE (OUTPATIENT)
Dept: FAMILY MEDICINE CLINIC | Facility: CLINIC | Age: 49
End: 2019-08-28

## 2019-08-28 RX ORDER — DULOXETIN HYDROCHLORIDE 60 MG/1
60 CAPSULE, DELAYED RELEASE ORAL DAILY
Qty: 30 CAPSULE | Refills: 11 | Status: SHIPPED | OUTPATIENT
Start: 2019-08-28 | End: 2019-09-04 | Stop reason: SDUPTHER

## 2019-08-28 NOTE — TELEPHONE ENCOUNTER
Patient states the Cymbalta 30mg QD was not working so she doubled the medication and is now taking 2 of the 30mg capsules to equal 60 mg daily. She would like to know if a new prescription can be called into the pharm

## 2019-08-28 NOTE — TELEPHONE ENCOUNTER
----- Message from Lashonda Mosher sent at 8/28/2019  8:38 AM EDT -----  Contact: PT.  PT. SEE'S RAVIN.  PT. IS WANTING HER TENISHA LAGOS TO CALL HER BACK. (RE. MEDICATION ISSUES).    PT. CAN BE REACHED @ [phone], CELL PHONE #.

## 2019-09-04 ENCOUNTER — TELEPHONE (OUTPATIENT)
Dept: FAMILY MEDICINE CLINIC | Facility: CLINIC | Age: 49
End: 2019-09-04

## 2019-09-04 RX ORDER — DULOXETIN HYDROCHLORIDE 60 MG/1
60 CAPSULE, DELAYED RELEASE ORAL DAILY
Qty: 30 CAPSULE | Refills: 11 | Status: SHIPPED | OUTPATIENT
Start: 2019-09-04 | End: 2019-12-11 | Stop reason: ALTCHOICE

## 2019-09-04 NOTE — TELEPHONE ENCOUNTER
Patient wants this medication re-sent to [facility] not [facility]. This was resent and pt was verbally notified

## 2019-09-04 NOTE — TELEPHONE ENCOUNTER
----- Message from Lashonda Mosher sent at 9/4/2019 10:51 AM EDT -----  Contact: PT.  PT. SEE'S RAVIN.  PT. IS WANTING THE REQUEST (PRIOR), OF WILDER CALLED INTO [facility]/[place], [place].  PT. STATED RX DOES NOT HAVE THERE YET.  PT. CAN BE REACHED @ ABOVE HOME #.

## 2019-12-11 ENCOUNTER — OFFICE VISIT (OUTPATIENT)
Dept: FAMILY MEDICINE CLINIC | Facility: CLINIC | Age: 49
End: 2019-12-11

## 2019-12-11 VITALS
HEIGHT: 62 IN | HEART RATE: 72 BPM | BODY MASS INDEX: 28.01 KG/M2 | DIASTOLIC BLOOD PRESSURE: 90 MMHG | WEIGHT: 152.2 LBS | OXYGEN SATURATION: 99 % | SYSTOLIC BLOOD PRESSURE: 138 MMHG | TEMPERATURE: 98 F

## 2019-12-11 DIAGNOSIS — J44.9 CHRONIC OBSTRUCTIVE PULMONARY DISEASE, UNSPECIFIED COPD TYPE (HCC): ICD-10-CM

## 2019-12-11 DIAGNOSIS — G47.30 SLEEP APNEA, UNSPECIFIED TYPE: ICD-10-CM

## 2019-12-11 DIAGNOSIS — K21.9 GASTROESOPHAGEAL REFLUX DISEASE, ESOPHAGITIS PRESENCE NOT SPECIFIED: Primary | ICD-10-CM

## 2019-12-11 DIAGNOSIS — F32.A DEPRESSION, UNSPECIFIED DEPRESSION TYPE: ICD-10-CM

## 2019-12-11 PROCEDURE — 99214 OFFICE O/P EST MOD 30 MIN: CPT | Performed by: PHYSICIAN ASSISTANT

## 2019-12-11 RX ORDER — PANTOPRAZOLE SODIUM 40 MG/1
40 TABLET, DELAYED RELEASE ORAL DAILY
Qty: 30 TABLET | Refills: 11 | Status: SHIPPED | OUTPATIENT
Start: 2019-12-11 | End: 2020-01-30

## 2019-12-11 RX ORDER — ALBUTEROL SULFATE 90 UG/1
2 AEROSOL, METERED RESPIRATORY (INHALATION) EVERY 4 HOURS PRN
Qty: 1 INHALER | Refills: 0 | Status: SHIPPED | OUTPATIENT
Start: 2019-12-11 | End: 2020-07-01

## 2019-12-11 NOTE — PROGRESS NOTES
Subjective   Luz Elena Ballard is a 49 y.o. female  Heartburn (Needs new medication for GERD, Omeprazole not covered under insurance) and Depression (Wants to discuss other options for depression, Cymbalta not working )      History of Present Illness  Patient comes in today for 2 separate issues she is here for valuation treatment of heartburn/GERD she states her symptoms are currently uncontrolled because she is not able to get her omeprazole filled she was recently told it was no longer covered on her insurance.  She has longstanding acid reflux has had esophagitis in the past and has been stable on omeprazole for over a year until recently when it was discontinued.  States she is been having more heartburn recently off medication.  No vomiting but does have some nausea.  She also wants to discuss uncontrolled depression she is currently on Cymbalta 60 mg and states she feels a lot of anger, irritability, frustration, short tempered and depression.  Denies any homicidal or suicidal ideations.  She has scheduled an appointment for an evaluation with a psychiatrist but cannot be seen until next month.  The following portions of the patient's history were reviewed and updated as appropriate: allergies, current medications, past social history and problem list    Review of Systems   Constitutional: Positive for fatigue. Negative for appetite change and unexpected weight change.   Respiratory: Positive for apnea and wheezing ( Needs refill of albuterol having wheezing at times with COPD). Negative for chest tightness and shortness of breath.         Patient states she is having more problems with sleep apnea she has been diagnosed with sleep apnea in the past but does not have a CPAP machine at this time.  She is having a lot of daytime fatigue.   Cardiovascular: Negative for chest pain.   Gastrointestinal: Negative for abdominal distention, abdominal pain, diarrhea and nausea.        Patient experiencing  heartburn/acid reflux     Neurological: Negative for dizziness, tremors, weakness, light-headedness and headaches.   Psychiatric/Behavioral: Positive for dysphoric mood and sleep disturbance. Negative for agitation, behavioral problems, confusion, decreased concentration and suicidal ideas. The patient is nervous/anxious.        Objective     Vitals:    12/11/19 1445   BP: 138/90   Pulse: 72   Temp: 98 °F (36.7 °C)   SpO2: 99%       Physical Exam   Constitutional: She is oriented to person, place, and time. She appears well-developed and well-nourished. No distress.   HENT:   Head: Normocephalic.   Mouth/Throat: Oropharynx is clear and moist.   No Exopthalmos   Eyes: Conjunctivae are normal.   Neck: No JVD present. No thyroid mass and no thyromegaly present.   Cardiovascular: Normal rate, regular rhythm and normal heart sounds.   Pulmonary/Chest: Effort normal and breath sounds normal.   Abdominal: Soft. Bowel sounds are normal. She exhibits no distension and no mass. There is no tenderness. There is no rebound and no guarding. No hernia.   Lymphadenopathy:     She has no cervical adenopathy.   Neurological: She is alert and oriented to person, place, and time. No cranial nerve deficit. Coordination normal.   Skin: Skin is warm and dry. She is not diaphoretic.   Psychiatric: Her speech is normal and behavior is normal. Judgment and thought content normal. Her mood appears not anxious. Her affect is blunt. Her affect is not angry and not inappropriate. Cognition and memory are normal. She exhibits a depressed mood. She is attentive.   Nursing note and vitals reviewed.      Assessment/Plan     Diagnoses and all orders for this visit:    Gastroesophageal reflux disease, esophagitis presence not specified    Depression, unspecified depression type    Sleep apnea, unspecified type  -     Ambulatory Referral to Neurology    Chronic obstructive pulmonary disease, unspecified COPD type (CMS/[facility])    Other orders  -      pantoprazole (PROTONIX) 40 MG EC tablet; Take 1 tablet by mouth Daily.  -     albuterol sulfate  (90 Base) MCG/ACT inhaler; Inhale 2 puffs Every 4 (Four) Hours As Needed for Wheezing.  -     Vortioxetine HBr (TRINTELLIX) 10 MG tablet; Take 10 mg by mouth Daily.

## 2019-12-12 ENCOUNTER — TELEPHONE (OUTPATIENT)
Dept: FAMILY MEDICINE CLINIC | Facility: CLINIC | Age: 49
End: 2019-12-12

## 2019-12-12 NOTE — TELEPHONE ENCOUNTER
PATIENT STATES THAT HER INSURANCE COMPANY HAS DENIED THE PROTONIX 40 GONZALO AND THAT HER PHARMACY SHOULD BE SENDING OVER THE DENIAL LETTER AND WOULD LIKE TO KNOW WHAT THE NEXT STEP WILL BE? PLEASE GIVE PATIENT A CALL BACK [phone].

## 2019-12-13 ENCOUNTER — TELEPHONE (OUTPATIENT)
Dept: FAMILY MEDICINE CLINIC | Facility: CLINIC | Age: 49
End: 2019-12-13

## 2020-01-22 ENCOUNTER — TELEPHONE (OUTPATIENT)
Dept: FAMILY MEDICINE CLINIC | Facility: CLINIC | Age: 50
End: 2020-01-22

## 2020-01-22 RX ORDER — ACYCLOVIR 400 MG/1
400 TABLET ORAL 3 TIMES DAILY
Qty: 15 TABLET | Refills: 1 | Status: SHIPPED | OUTPATIENT
Start: 2020-01-22 | End: 2020-01-30

## 2020-01-22 NOTE — TELEPHONE ENCOUNTER
PT. SEE'S RAVIN.  PT. HAS A HERPES BREAK OUT.  PT. WANTING TO KNOW IF NEEDING TO BE SEEN OR CALL IN SOMETHING FOR HER?    [facility] [place] [place]    PT. CAN BE REACHED @ [phone], HOME #.

## 2020-01-27 RX ORDER — GABAPENTIN 800 MG/1
TABLET ORAL
Qty: 90 TABLET | OUTPATIENT
Start: 2020-01-27

## 2020-01-27 NOTE — TELEPHONE ENCOUNTER
Tried to call patient for f/u appt [phone] ( rejects call), JIMm on [phone] to schedule a follow up appt with Toña to obtain any additional refills

## 2020-01-27 NOTE — TELEPHONE ENCOUNTER
This is a controlled substance patient needs an appointment in the office for evaluation for refills.

## 2020-01-30 ENCOUNTER — OFFICE VISIT (OUTPATIENT)
Dept: FAMILY MEDICINE CLINIC | Facility: CLINIC | Age: 50
End: 2020-01-30

## 2020-01-30 VITALS
HEART RATE: 79 BPM | TEMPERATURE: 98 F | OXYGEN SATURATION: 99 % | WEIGHT: 153 LBS | SYSTOLIC BLOOD PRESSURE: 138 MMHG | DIASTOLIC BLOOD PRESSURE: 80 MMHG | BODY MASS INDEX: 28.16 KG/M2 | HEIGHT: 62 IN

## 2020-01-30 DIAGNOSIS — K58.9 IRRITABLE BOWEL SYNDROME, UNSPECIFIED TYPE: ICD-10-CM

## 2020-01-30 DIAGNOSIS — G62.9 POLYNEUROPATHY: ICD-10-CM

## 2020-01-30 DIAGNOSIS — R11.0 CHRONIC NAUSEA: ICD-10-CM

## 2020-01-30 DIAGNOSIS — K21.9 GASTROESOPHAGEAL REFLUX DISEASE, ESOPHAGITIS PRESENCE NOT SPECIFIED: Primary | ICD-10-CM

## 2020-01-30 PROCEDURE — 99214 OFFICE O/P EST MOD 30 MIN: CPT | Performed by: PHYSICIAN ASSISTANT

## 2020-01-30 RX ORDER — ONDANSETRON 8 MG/1
8 TABLET, ORALLY DISINTEGRATING ORAL 3 TIMES DAILY
Qty: 15 TABLET | Refills: 11 | OUTPATIENT
Start: 2020-01-30 | End: 2020-11-24

## 2020-01-30 NOTE — PROGRESS NOTES
Subjective   Luz Elena Ballard is a 49 y.o. female  Peripheral Neuropathy (req refills on gabapentin for neuropathy) and Nausea (req refill on zofran for intermittent nausea)      History of Present Illness  Patient comes in today for follow-up on peripheral neuropathy, depression and chronic nausea with history of hepatitis C in the past.  She also has history of irritable bowel syndrome and GERD.  She has had full GI work-up in the past regarding the chronic nausea and has been associated primarily with irritable bowel syndrome and GERD.  She states that she is feeling better overall from her last appointment, she states she went away on vacation for about a month and feels much better.  She states she feels that she is handling stress better.  She has an appointment psychiatry next week for evaluation of chronic depression but states she is not feeling depressed at this time.  Peripheral neuropathy stable on gabapentin 3 times daily, patient continues to have pain in both arms without gabapentin and to some extent a small amount even on gabapentin.  Denies adverse effects from medication.  The following portions of the patient's history were reviewed and updated as appropriate: allergies, current medications, past social history and problem list    Review of Systems   Constitutional: Negative for activity change, appetite change, chills, fever and unexpected weight change.   Respiratory: Negative for cough, chest tightness and shortness of breath.    Cardiovascular: Negative for chest pain.   Gastrointestinal: Positive for nausea. Negative for abdominal distention, abdominal pain, blood in stool, constipation, diarrhea and vomiting.   Genitourinary: Negative for difficulty urinating and dysuria.   Musculoskeletal: Positive for arthralgias and myalgias. Negative for back pain, gait problem and joint swelling.   Skin: Negative.  Negative for color change and rash.   Allergic/Immunologic: Negative for food  allergies.   Neurological: Positive for numbness. Negative for weakness.   Psychiatric/Behavioral: Negative for dysphoric mood. The patient is nervous/anxious.        Objective     Vitals:    01/30/20 1500   BP: 138/80   Pulse: 79   Temp: 98 °F (36.7 °C)   SpO2: 99%       Physical Exam   Constitutional: She is oriented to person, place, and time. She appears well-developed and well-nourished. No distress.   Eyes: Conjunctivae are normal.   Cardiovascular: Normal rate and regular rhythm.   Pulmonary/Chest: Effort normal and breath sounds normal.   Abdominal: Soft. Bowel sounds are normal. She exhibits no distension and no mass. There is no tenderness. There is no rebound and no guarding. No hernia.   Neurological: She is alert and oriented to person, place, and time. A sensory deficit is present.   Skin: Skin is warm and dry. No rash noted. She is not diaphoretic. No erythema. No pallor.   Psychiatric: She has a normal mood and affect. Her behavior is normal. Judgment and thought content normal.   Nursing note and vitals reviewed.      Assessment/Plan     Luz Elena was seen today for peripheral neuropathy and nausea.    Diagnoses and all orders for this visit:    Gastroesophageal reflux disease, esophagitis presence not specified    Chronic nausea  -     ondansetron ODT (ZOFRAN-ODT) 8 MG disintegrating tablet; Place 1 tablet under the tongue 3 (Three) Times a Day.    Irritable bowel syndrome, unspecified type    Polyneuropathy    Gabapentin 800 mg 1 3 times daily #90 with 5 refills.  Discussed abuse potential and controlled substance status of this medication need to follow-up in the office in 6 months for recheck.  She will keep appoint with psychiatry for episodic depressive disorder and history of anxiety.

## 2020-02-14 ENCOUNTER — OFFICE VISIT (OUTPATIENT)
Dept: NEUROLOGY | Facility: CLINIC | Age: 50
End: 2020-02-14

## 2020-02-14 VITALS
BODY MASS INDEX: 27.79 KG/M2 | SYSTOLIC BLOOD PRESSURE: 130 MMHG | HEIGHT: 62 IN | WEIGHT: 151 LBS | HEART RATE: 79 BPM | OXYGEN SATURATION: 98 % | DIASTOLIC BLOOD PRESSURE: 80 MMHG

## 2020-02-14 DIAGNOSIS — G62.2 POLYNEUROPATHY DUE TO OTHER TOXIC AGENTS (HCC): Primary | ICD-10-CM

## 2020-02-14 PROCEDURE — 99244 OFF/OP CNSLTJ NEW/EST MOD 40: CPT | Performed by: NURSE PRACTITIONER

## 2020-02-14 NOTE — PROGRESS NOTES
Subjective   Patient ID: Luz Elena Ballard is a 49 y.o. female     Chief Complaint   Patient presents with   • Numbness and Tingling        History of Present Illness  49 y.o. Female referred by Toña Beckford for peripheral neuropathy.     Patient has previous IV drug abuse history with heroin, primary injection sites in arms for >10 years, she has a history of osteomyelitis in her lower back, last hospitalization was 5 years ago. Has been clean for 1 year.     Has lost strength in bilateral upper extremities, and constant numbness and tingling which began approximately 6 months ago. Is treated by Toña with  mg PO TID, which is beneficial for her legs and back, but not in her arms.   Dropping things due to decreased , difficulty writing, hands will cramp.     Has multiple areas of scarring, and nodules under the skin on her arms.     Toña Beckford note from 1/30/2020 reviewed and summarized:    Peripheral Neuropathy stable on GBP PO TID, pain in both arms without GBP and even a small amount even on GBP.     Past Medical History:   Diagnosis Date   • Arthritis     Hx of.   • Basal cell carcinoma     Hx of.   • Cellulitis     Hx of. Resolved 2/15/2016.   • Cellulitis of finger     History of Cellulitis Fingers Right Middle Finger. Resolved 2/15/2016   • Depression     Hx of.   • Fatty liver    • History of Blocked tear duct     Resolved 2/15/2016   • History of migraine headaches    • Hypercholesterolemia     Hx of.   • Infectious viral hepatitis     HEP C   • IV drug abuse (CMS/HCC)     Hx of. Resolved 2/12/2016.   • Obstructive sleep apnea     Hx of.   • Sciatica     Hx of. Resolved. 2/15/2016. Resolved. 7/21/2015.   • Sciatica    • Skin cancer     Hx of.   • Upper respiratory infection     Hx of. 2/15/2016     Family History   Problem Relation Age of Onset   • Hypertension Mother    • Arthritis Mother    • Depression Mother    • Anxiety disorder Father    • Suicidality Father    • Depression Sister  "   • Diabetes Maternal Grandmother    • Breast cancer Neg Hx    • Ovarian cancer Neg Hx      Social History     Socioeconomic History   • Marital status:      Spouse name: Not on file   • Number of children: Not on file   • Years of education: Not on file   • Highest education level: Not on file   Tobacco Use   • Smoking status: Light Tobacco Smoker   • Smokeless tobacco: Never Used   • Tobacco comment: Smokes less than half pack per week   Substance and Sexual Activity   • Alcohol use: No   • Drug use: Yes     Types: IV, Heroin   • Sexual activity: Yes     Partners: Male       Review of Systems   Constitutional: Negative for activity change, fatigue and unexpected weight change.   HENT: Negative for tinnitus and trouble swallowing.    Eyes: Negative for photophobia and visual disturbance.   Respiratory: Negative for apnea, cough and choking.    Cardiovascular: Negative for leg swelling.   Gastrointestinal: Negative for nausea and vomiting.   Endocrine: Negative for cold intolerance and heat intolerance.   Genitourinary: Negative for difficulty urinating, frequency, menstrual problem and urgency.   Musculoskeletal: Positive for back pain. Negative for gait problem, myalgias and neck pain.   Skin: Negative for color change and rash.   Allergic/Immunologic: Negative for immunocompromised state.   Neurological: Positive for weakness and numbness. Negative for dizziness, tremors, seizures, syncope, facial asymmetry, speech difficulty, light-headedness and headaches.   Hematological: Negative for adenopathy. Does not bruise/bleed easily.   Psychiatric/Behavioral: Negative for behavioral problems, confusion, decreased concentration, hallucinations and sleep disturbance.       Objective     Vitals:    02/14/20 1407   BP: 130/80   Pulse: 79   SpO2: 98%   Weight: 68.5 kg (151 lb)   Height: 157.5 cm (62\")       Neurologic Exam     Mental Status   Oriented to person, place, and time.   Registration: recalls 3 of 3 " objects. Recall at 5 minutes: recalls 3 of 3 objects. Follows 3 step commands.   Attention: normal. Concentration: normal.   Speech: speech is normal   Level of consciousness: alert  Knowledge: good and consistent with education.   Able to name object. Able to read. Able to repeat. Able to write. Normal comprehension.     Cranial Nerves     CN II   Visual fields full to confrontation.   Visual acuity: normal  Right visual field deficit: none  Left visual field deficit: none     CN III, IV, VI   Pupils are equal, round, and reactive to light.  Extraocular motions are normal.   Right pupil: Shape: regular. Reactivity: brisk. Consensual response: intact.   Left pupil: Shape: regular. Reactivity: brisk. Consensual response: intact.   Nystagmus: none   Diplopia: none  Ophthalmoparesis: none  Upgaze: normal  Downgaze: normal  Conjugate gaze: present  Vestibulo-ocular reflex: present    CN V   Facial sensation intact.   Right corneal reflex: normal  Left corneal reflex: normal    CN VII   Right facial weakness: none  Left facial weakness: none    CN VIII   Hearing: intact    CN IX, X   Palate: symmetric  Right gag reflex: normal  Left gag reflex: normal    CN XI   Right sternocleidomastoid strength: normal  Left sternocleidomastoid strength: normal    CN XII   Tongue: not atrophic  Fasciculations: absent  Tongue deviation: none    Motor Exam   Muscle bulk: normal  Overall muscle tone: normal  Right arm tone: normal  Left arm tone: normal  Right leg tone: normal  Left leg tone: normal    Strength   Strength 5/5 throughout.     Sensory Exam   Right arm light touch: decreased from fingers  Left arm light touch: decreased from fingers  Vibration normal.   Proprioception normal.   Right arm pinprick: decreased from fingers  Left arm pinprick: decreased from fingers  Tingling in bilateral upper extremities with touch from shoulder into hands.      Gait, Coordination, and Reflexes     Gait  Gait: normal    Coordination   Romberg:  negative  Finger to nose coordination: normal  Heel to shin coordination: normal  Tandem walking coordination: normal    Tremor   Resting tremor: absent  Intention tremor: absent  Action tremor: absent    Reflexes   Reflexes 2+ except as noted.       Physical Exam   Constitutional: She is oriented to person, place, and time. She appears well-developed and well-nourished.   HENT:   Head: Normocephalic and atraumatic.   Eyes: Pupils are equal, round, and reactive to light. EOM are normal.   Fundoscopic exam:       The right eye shows no exudate, no hemorrhage and no papilledema. The right eye shows red reflex.        The left eye shows no exudate, no hemorrhage and no papilledema. The left eye shows red reflex.   Cardiovascular: Normal rate.   Pulmonary/Chest: Effort normal.   Neurological: She is alert and oriented to person, place, and time. She has normal strength. She has a normal Finger-Nose-Finger Test, a normal Heel to Shin Test, a normal Romberg Test and a normal Tandem Gait Test. Gait normal.   Skin: Skin is warm and dry.   Psychiatric: She has a normal mood and affect. Her speech is normal and behavior is normal.   Nursing note and vitals reviewed.      Lab on 09/24/2018   Component Date Value Ref Range Status   • Hep B Core Total Ab 09/24/2018 Negative  Negative Final   • Please note 09/24/2018 Comment   Final    This test was developed and its performance characteristics determined  by [facility].  It has not been cleared or approved by the U.S. Food and  Drug Administration.  The FDA has determined that such clearance or approval is not  necessary. This test is used for clinical purposes.  It should not be  regarded as investigational or for research.   • Hepatitis C Genotype 09/24/2018 1a   Final   • Protime 09/24/2018 10.8  9.6 - 11.5 Seconds Final   • INR 09/24/2018 1.03  0.91 - 1.09 Final   • Hep A Total Ab 09/24/2018 Positive* Negative Final   • Hep B S Ab 09/24/2018 Reactive* Non-Reactive Final   •  Fibrosis Score 2018 0.57* 0.00 - 0.21 Final   • Fibrosis Stage 2018 Comment   Final                F2 - Bridging fibrosis with few septa   • Necroinflammat Activity Score 2018 0.34* 0.00 - 0.17 Final   • Necroinflammat Activity Grade 2018 A1-Minimal activity   Final   • Alpha 2-Macroglobulins, Qn 2018 260  110 - 276 mg/dL Final   • Haptoglobin 2018 130  34 - 200 mg/dL Final   • Apolipoprotein A-1 2018 131  116 - 209 mg/dL Final   • Total Bilirubin 2018 0.8  0.0 - 1.2 mg/dL Final   • GGT 2018 205* 0 - 60 IU/L Final   • ALT (SGPT) 2018 45* 0 - 40 IU/L Final   • HCV Qual Interp 2018 Comment   Final    Quantitative results of 6 biochemical tests are analyzed using  a computational algorithm to provide a quantitative surrogate  marker (0.0-1.0) for liver fibrosis (METAVIR F0-F4) and for  necroinflammatory activity (METAVIR A0-A3).   • Fibrosis Scorin2018 Comment   Final          <0.21 = Stage F0 - No fibrosis  0.21 - 0.27 = Stage F0 - F1  0.27 - 0.31 = Stage F1 - Portal fibrosis  0.31 - 0.48 = Stage F1 - F2  0.48 - 0.58 = Stage F2 - Bridging fibrosis with few septa  0.58 - 0.72 = Stage F3 - Bridging fibrosis with many septa  0.72 - 0.74 = Stage F3 - F4        >0.74 = Stage F4 - Cirrhosis   • Necroinflamm Activity Scorin2018 Comment   Final          <0.17 = Grade A0 - No Activity  0.17 - 0.29 = Grade A0 - A1  0.29 - 0.36 = Grade A1 - Minimal activity  0.36 - 0.52 = Grade A1 - A2  0.52 - 0.60 = Grade A2 - Moderate activity  0.60 - 0.62 = Grade A2 - A3        >0.62 = Grade A3 - Severe activity   • Limitations: 2018 Comment   Final    The negative predictive value of a Fibrotest score <0.31 (absence of  clinically significant fibrosis) was 85% when compared to liver biopsy  in 1,270 HCV infected patients with a 38% prevalence of significant  liver fibrosis (F2, 3 or 4). The positive predictive value of a Fibro-  test score >0.48 (F2, 3,  4) was 61% in that same patient cohort. HCV  FibroSURE is not recommended in patients with Gilbert Disease, acute  hemolysis (e.g. HCV ribavirin therapy mediated hemolysis) acute hepa-  titis of the liver, extra-hepatic cholestasis, transplant patients,  and/or renal insufficiency patients.  Any of these clinical situations  may lead to inaccurate quantitative predictions of fibrosis and  necroinflammatory activity in the liver.   • Comment 09/24/2018 Comment   Final    This test was developed and its performance characteristics determined  by [facility].  It has not been cleared or approved by the Food and Drug  Administration.  The FDA has determined that such clearance or  approval is not necessary.  For questions regarding this report please contact customer service  at [phone].   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 Plan to continue sobriety and continue GBP.     Agree with plan by PA for  mg PO TID.     EMG/NCS ordered     May continue to F/U with Toña following EMG/NCS.          Relevant Orders    EMG & Nerve Conduction Test             No follow-ups on file.

## 2020-02-14 NOTE — ASSESSMENT & PLAN NOTE
Likely polyneuropathy due to prior IV drug abuse history, discussed with patient, she V/U. Plan to continue sobriety and continue GBP.     Agree with plan by PA for  mg PO TID.     EMG/NCS ordered     May continue to F/U with Toña following EMG/NCS.

## 2020-02-21 ENCOUNTER — TELEPHONE (OUTPATIENT)
Dept: FAMILY MEDICINE CLINIC | Facility: CLINIC | Age: 50
End: 2020-02-21

## 2020-02-21 NOTE — TELEPHONE ENCOUNTER
Patient states that she went to Dr. Atkinson's office for a sleep study and they do not do those anymore and Toña was supposed to be referring her to someone else and she hasn't heard yet.  She can be reached at [phone]

## 2020-02-24 ENCOUNTER — TRANSCRIBE ORDERS (OUTPATIENT)
Dept: FAMILY MEDICINE CLINIC | Facility: CLINIC | Age: 50
End: 2020-02-24

## 2020-02-24 DIAGNOSIS — G47.30 SLEEP APNEA, UNSPECIFIED TYPE: Primary | ICD-10-CM

## 2020-02-28 ENCOUNTER — TRANSCRIBE ORDERS (OUTPATIENT)
Dept: FAMILY MEDICINE CLINIC | Facility: CLINIC | Age: 50
End: 2020-02-28

## 2020-02-28 ENCOUNTER — TELEPHONE (OUTPATIENT)
Dept: FAMILY MEDICINE CLINIC | Facility: CLINIC | Age: 50
End: 2020-02-28

## 2020-02-28 DIAGNOSIS — M54.2 NECK PAIN, CHRONIC: ICD-10-CM

## 2020-02-28 DIAGNOSIS — M54.40 LOW BACK PAIN WITH SCIATICA, SCIATICA LATERALITY UNSPECIFIED, UNSPECIFIED BACK PAIN LATERALITY, UNSPECIFIED CHRONICITY: ICD-10-CM

## 2020-02-28 DIAGNOSIS — G89.29 NECK PAIN, CHRONIC: ICD-10-CM

## 2020-02-28 DIAGNOSIS — G47.31 PRIMARY CENTRAL SLEEP APNEA: Primary | ICD-10-CM

## 2020-03-23 ENCOUNTER — TELEPHONE (OUTPATIENT)
Dept: MRI IMAGING | Facility: HOSPITAL | Age: 50
End: 2020-03-23

## 2020-03-23 NOTE — TELEPHONE ENCOUNTER
Called to let the patient know that [facility] where her MRI is scheduled was closed until 4/27. She said it wasn't a convenient time for her to look at rescheduling and that she would call back.

## 2020-03-23 NOTE — TELEPHONE ENCOUNTER
Patient requested r/s of MRI CSP & LSP for 4/1 at [facility]. Patient was scheduled

## 2020-03-27 ENCOUNTER — TELEPHONE (OUTPATIENT)
Dept: FAMILY MEDICINE CLINIC | Facility: CLINIC | Age: 50
End: 2020-03-27

## 2020-03-27 ENCOUNTER — HOSPITAL ENCOUNTER (OUTPATIENT)
Dept: MRI IMAGING | Facility: HOSPITAL | Age: 50
Discharge: HOME | End: 2020-03-27

## 2020-03-27 ENCOUNTER — APPOINTMENT (OUTPATIENT)
Dept: MRI IMAGING | Facility: HOSPITAL | Age: 50
End: 2020-03-27

## 2020-03-27 NOTE — TELEPHONE ENCOUNTER
PT. SEE'S RAVIN.  PT. WAS ORDERED A CERVICAL SPINE MRI & INSURANCE DECLINED (PASSPORT).  VILMA TAY,  PRIOR AUTH. DEPT., TO CONTACT TO GIVE ADDITIONAL CLINICALS:  [phone].  TRACKING #: [id number].    VILMA CAN CHECK THIS ONLINE.    THIS WILL NEED TO BE COMPLETED BY Tuesday, 03-, OTHERWISE, CERVICAL MRI WILL HAVE TO BE CANCELLED.

## 2020-03-31 ENCOUNTER — PROCEDURE VISIT (OUTPATIENT)
Dept: NEUROLOGY | Facility: CLINIC | Age: 50
End: 2020-03-31

## 2020-03-31 ENCOUNTER — TELEPHONE (OUTPATIENT)
Dept: FAMILY MEDICINE CLINIC | Facility: CLINIC | Age: 50
End: 2020-03-31

## 2020-03-31 VITALS
BODY MASS INDEX: 28.89 KG/M2 | OXYGEN SATURATION: 98 % | HEIGHT: 62 IN | HEART RATE: 77 BPM | DIASTOLIC BLOOD PRESSURE: 78 MMHG | WEIGHT: 157 LBS | SYSTOLIC BLOOD PRESSURE: 132 MMHG

## 2020-03-31 DIAGNOSIS — G62.2 POLYNEUROPATHY DUE TO OTHER TOXIC AGENTS (HCC): Primary | ICD-10-CM

## 2020-03-31 PROCEDURE — 95913 NRV CNDJ TEST 13/> STUDIES: CPT | Performed by: PSYCHIATRY & NEUROLOGY

## 2020-03-31 PROCEDURE — 95886 MUSC TEST DONE W/N TEST COMP: CPT | Performed by: PSYCHIATRY & NEUROLOGY

## 2020-03-31 NOTE — PROGRESS NOTES
[facility] [place]   Electrodiagnostic Laboratory    Nerve Conduction & EMG Report        Patient:  Luz Elena Ballard   Patient ID: [id number]   YOB: 1970  Sex:  female      Exam Physician: Nilay Atkinson MD  Refer Physician:  Randa Fraser NP     Electromyogram and Nerve Conduction Velocity Procedure Note    Hx: 49 y.o. right handed female with complaint of pain and weakness involving the the upper extremities. Symptoms have been present for 10 years and were provoked by IVDA.  Significant past medical history includes IVDA, osteomyelitis. Medications include GBP. Family history no family history of nerve or muscle disease.    Exam: Motor power is normal. There is no atrophy. There are no fasciculations. Deep tendon reflexes are present and symmetrical. Sensory exam is abnormal distal symmetrical loss of pin and light touch in the upper extremities    Edx studies of the bilateral upper extremities were performed to evaluate for peripheral neuropathy.     NCS Examination   For sensory nerve conduction studies, the amplitude is measured peak-to-peak, the latency reported is the distal peak latency, and the conduction velocity, if measured, is determined from onset latencies and is over the forearm.   For motor nerve conduction studies, the amplitude is measured baseline-to-peak, the latency reported is the distal onset latency, the conduction velocity is calculated over the forearm, and the F wave latency is the minimum latency.   Unless otherwise noted, the hand temperature was monitored continuously and remained between 32°C and 36°C during the performance of the NCSs.      NCS      Nerve / Sites Rec. Site Onset Lat Peak Lat NP Amp PP Amp Segments Distance Velocity     ms ms µV µV  cm m/s   L MEDIAN - Dig II Antidr      Wrist Dig II 2.25 2.95 51.0 120.7 Wrist - Dig II 14 62.2      Ref.   3.70 20.0  Ref.     R MEDIAN - Dig II Antidr      Wrist Dig II 2.50 3.40 39.8 42.9 Wrist - Dig  II 14 56.0      Ref.   3.70 20.0  Ref.     L ULNAR - Dig V Antidr      Wrist Dig V 1.85 2.65 51.9 68.1 Wrist - Dig V 14 75.7      Ref.   3.70 10.0  Ref.     R ULNAR - Dig V Antidr      Wrist Dig V 2.10 3.00 51.5 61.4 Wrist - Dig V 14 66.7      Ref.   3.70 10.0  Ref.     L SURAL - Lat Mall Antidr      Calf Lat Mall 1.70 2.60 9.8 3.7 Calf - Lat Mall 14 82.4      Ref.   4.20 5.0  Ref.     R SURAL - Lat Mall Antidr      Calf Lat Mall 1.65 2.20 7.3 7.3 Calf - Lat Mall 14 84.8      Ref.   4.20 5.0  Ref.                     Motor NCS      Nerve / Sites Rec. Site Lat Amp Seq Amp Segments Dist Velocity     ms mV %  cm m/s   L MEDIAN - APB      Wrist APB 2.90 9.0 100 Wrist - APB 8       Ref.  4.40 4.0  Ref.        Elbow APB 6.45 8.9 97.9 Elbow - Wrist 22 62.0      Ref.     Ref.  49.0   R MEDIAN - APB      Wrist APB 3.50 9.9 100 Wrist - APB 8       Ref.  4.40 4.0  Ref.        Elbow APB 6.95 5.4 54.4 Elbow - Wrist 21 60.9      Ref.     Ref.  49.0   L ULNAR - ADM      Wrist ADM 2.55 8.4 100 Wrist - ADM 8       Ref.  3.90 5.0  Ref.        B.Elbow ADM 5.75 6.8 80.6 B.Elbow - Wrist 19 59.4      Ref.     Ref.  49.0      A.Elbow ADM 6.45 8.8 129 A.Elbow - B.Elbow 6 85.7   R ULNAR - ADM      Wrist ADM 2.10 8.4 100 Wrist - ADM 8       Ref.  3.90 5.0  Ref.        B.Elbow ADM 5.95 6.6 79 B.Elbow - Wrist 19 49.4      Ref.     Ref.  49.0      A.Elbow ADM 7.35 6.8 102 A.Elbow - B.Elbow 9 64.3   L COMM PERONEAL - EDB      Ankle EDB 4.25 7.5 100 Ankle - EDB 8       Ref.  6.00 2.0  Ref.        Fib Head EDB 11.10 4.8 64.5 Fib Head - Ankle 29 42.3      Ref.     Ref.  39.0      Knee EDB 12.25 6.0 124 Knee - Fib Head 8 69.6   R COMM PERONEAL - EDB      Ankle EDB 4.30 8.4 100 Ankle - EDB 8       Ref.  6.00 2.0  Ref.        Fib Head EDB 11.05 8.4 99 Fib Head - Ankle 28 41.5      Ref.     Ref.  39.0      Knee EDB 12.75 8.5 102 Knee - Fib Head 8 47.1   L TIBIAL (KNEE) - AH      Ankle AH 3.25 9.7 100 Ankle - AH 8       Ref.  6.00 2.5  Ref.        Knee  AH 12.20 6.2 63.5 Knee - Ankle 37 41.3      Ref.     Ref.  39.0   R TIBIAL (KNEE) - AH      Ankle AH 4.20 9.3 100 Ankle - AH 8       Ref.  6.00 2.5  Ref.        Knee AH 12.10 4.5 48 Knee - Ankle 37 46.8      Ref.     Ref.  39.0                       F  Wave      Nerve Fmin    ms   L COMM PERONEAL 53.30   REF 56.00   L TIBIAL (KNEE) 48.35   REF 56.00   R TIBIAL (KNEE) 50.25   REF 56.00   L MEDIAN 25.90   REF 33.00   L ULNAR 26.40   REF 33.00   R COMM PERONEAL 49.15   REF 56.00   R MEDIAN 25.05   REF 33.00   R ULNAR 26.45   REF 33.00                       H Reflex      Nerve H Lat    ms   L TIBIAL (KNEE) - Soleus (S1) 28.30   Ref 36.00   R TIBIAL (KNEE) - Soleus (S1) 28.95   Ref 36.00                           EMG Examination   The study was performed with a concentric needle electrode. Fibrillation and fasciculation activity is graded from none (0) to continuous (4+). The configuration and recruitment pattern of motor unit action potentials under voluntary control, if not normal, are described bel      EMG Summary Table     Spontaneous MUAP Recruitment    IA Fib PSW Fasc H.F. Amp Dur. PPP Pattern   L. DELTOID N None None None None N N N N   R. DELTOID N None None None None N N N N   L. BICEPS N None None None None N N N N   R. BICEPS N None None None None N N N N   L. TRICEPS N None None None None N N N N   R. TRICEPS N None None None None N N N N   L. PRON TERES N None None None None N N N N   R. PRON TERES N None None None None N N N N   L. FIRST D INTEROSS N None None None None N N N N   R. FIRST D INTEROSS N None None None None N N N N   L. ABD POLL BREVIS N None None None None N N N N   R. ABD POLL BREVIS N None None None None N N N N   L. ILIOPSOAS N None None None None N N N N   R. ILIOPSOAS N None None None None N N N N   L. GLUTEUS MAX N None None None None N N N N   R. GLUTEUS MAX N None None None None N N N N   L. QUADRICEPS N None None None None N N N N   R. QUADRICEPS N None None None None N N N N   L.  BIC FEM (L HEAD) N None None None None N N N N   R. BIC FEM (L HEAD) N None None None None N N N N   L. GASTROCN (MED) N None None None None N N N N   R. GASTROCN (MED) N None None None None N N N N   L. TIB ANTERIOR N None None None None N N N N   R. TIB ANTERIOR N None None None None N N N N       · Right median motor responses and F waves were normal.  · Right median sensory responses were normal  · Right ulnar motor responses and F wave were normal.  · Right ulnar sensory responses were normal  · Left median motor responses and F waves were normal.  · Left median sensory responses were normal  · Left ulnar motor responses and F wave were normal.  · Left ulnar sensory responses were normal  · Needle examination with a concentric needle electrode of selected muscles of the bilateral upper extremities were normal    · Left common peroneal motor responses and F wave were normal  · Left tibial motor responses and F wave were normal  · Left sural sensory responses were normal  · Left H-reflex responses were normal  · Right common peroneal motor responses and F wave were normal  · Right tibial motor responses and F wave were normal  · Right sural sensory responses were normal  · Right H-reflex responses were normal  · Needle examination with a concentric needle electrode of selected muscles of the bilateral lower extremities were normal           Conclusion: Normal NCV and EMG of the bilateral upper and lower extremities          Instrument used:  T[employer] Synergy        Performed by:          Nilay Atkinson MD

## 2020-04-01 ENCOUNTER — HOSPITAL ENCOUNTER (OUTPATIENT)
Dept: MRI IMAGING | Facility: HOSPITAL | Age: 50
End: 2020-04-01

## 2020-04-01 ENCOUNTER — APPOINTMENT (OUTPATIENT)
Dept: MRI IMAGING | Facility: HOSPITAL | Age: 50
End: 2020-04-01

## 2020-04-27 ENCOUNTER — APPOINTMENT (OUTPATIENT)
Dept: MRI IMAGING | Facility: HOSPITAL | Age: 50
End: 2020-04-27

## 2020-04-30 ENCOUNTER — HOSPITAL ENCOUNTER (OUTPATIENT)
Dept: MRI IMAGING | Facility: HOSPITAL | Age: 50
Discharge: HOME | End: 2020-04-30
Admitting: PHYSICIAN ASSISTANT

## 2020-04-30 PROCEDURE — 72148 MRI LUMBAR SPINE W/O DYE: CPT

## 2020-05-04 ENCOUNTER — TELEPHONE (OUTPATIENT)
Dept: FAMILY MEDICINE CLINIC | Facility: CLINIC | Age: 50
End: 2020-05-04

## 2020-05-08 ENCOUNTER — TELEMEDICINE (OUTPATIENT)
Dept: FAMILY MEDICINE CLINIC | Facility: CLINIC | Age: 50
End: 2020-05-08

## 2020-05-08 DIAGNOSIS — M54.12 CERVICAL RADICULOPATHY: ICD-10-CM

## 2020-05-08 DIAGNOSIS — G89.29 NECK PAIN, CHRONIC: Primary | ICD-10-CM

## 2020-05-08 DIAGNOSIS — M79.605 PAIN OF LEFT LOWER EXTREMITY: ICD-10-CM

## 2020-05-08 DIAGNOSIS — M51.26 LUMBAR DISC HERNIATION: ICD-10-CM

## 2020-05-08 DIAGNOSIS — M54.12 CERVICAL RADICULOPATHY: Primary | ICD-10-CM

## 2020-05-08 DIAGNOSIS — M54.2 NECK PAIN, CHRONIC: Primary | ICD-10-CM

## 2020-05-08 DIAGNOSIS — R60.0 LOCALIZED EDEMA: ICD-10-CM

## 2020-05-08 PROCEDURE — 99214 OFFICE O/P EST MOD 30 MIN: CPT | Performed by: PHYSICIAN ASSISTANT

## 2020-05-08 RX ORDER — GABAPENTIN 800 MG/1
800 TABLET ORAL 4 TIMES DAILY
Qty: 120 TABLET | Refills: 1 | OUTPATIENT
Start: 2020-05-08 | End: 2020-07-13 | Stop reason: SDUPTHER

## 2020-05-08 NOTE — TELEPHONE ENCOUNTER
Patient completed video visit with Toña hernández. Gabapentin ( new dose) was called into pharm voicemail, FWD to MD to sign off

## 2020-05-08 NOTE — PROGRESS NOTES
Subjective   Luz Elena Ballard is a 50 y.o. female  Med Refill; Arm Pain; and Leg Pain      History of Present Illness  Patient is a pleasant 50-year-old white female who presents today via video visit after giving consent for this being her office visit today.  She is needing to be evaluated and treated forA worsening chronic medical problem and a new medical problem.  Patient has been experiencing worsening chronic pain in her left shoulder radiating down her left arm to her wrist for over 2 months.  No history of trauma.  She states that she has been taking her gabapentin as prescribed, has tried stretching exercises, resting her arm but the pain is worsening.  She states she cannot lift her arm above her head without have any shooting pain going from the back of her neck down her left posterior shoulder to her forearm.  She states it is a piercing pain.  She also feels weakness in her arm when she tries to lift things.  This is worsened over the past week.  She feels a tingling burning sensation when she lifts her arm as well.  No swelling of her arm.  Symptoms seem to worsen when she rotates her neck.  Pain is keeping her from being able to carry out daily activities and keeping her from sleep well.  Additionally she has noticed swelling behind her left knee and into her left calf.  She states this is been ongoing for couple of weeks.  At first she thought she had strained her knee but she is not having pain in the front of her knee just the back.  Sometimes he notices some discoloration in her toes looking bluish.  She states it comes and goes.  No shortness of breath, no chest pain.  Video visit lasted 25 minutes.  The following portions of the patient's history were reviewed and updated as appropriate: allergies, current medications, past social history and problem list    Review of Systems   Constitutional: Positive for activity change.   Respiratory: Negative.    Cardiovascular: Negative.     Gastrointestinal: Negative.    Musculoskeletal: Positive for arthralgias, joint swelling, myalgias and neck pain. Negative for gait problem.   Skin: Positive for color change.   Neurological: Positive for weakness and numbness. Negative for tremors.   Psychiatric/Behavioral: Positive for sleep disturbance.       Objective     There were no vitals filed for this visit.    Physical Exam   Constitutional: She is oriented to person, place, and time. She appears well-developed and well-nourished.  Non-toxic appearance. She does not have a sickly appearance. She does not appear ill. No distress.   Pulmonary/Chest: Effort normal. No respiratory distress.   Musculoskeletal: She exhibits tenderness ( Patient points to her left posterior shoulder, left lateral cervical region, entire left upper arm down to forearm as area she feels a deep throbbing pain). She exhibits no edema or deformity.   Patient points to her posterior left knee where she states it looks like there is fluid, a swollen kind of appearance and feels a little swelling to her lower leg.   Neurological: She is alert and oriented to person, place, and time. A sensory deficit ( Patient states that her left arm has decreased sensation compared to her right ) is present. Coordination normal.   Skin: No rash noted. She is not diaphoretic. No erythema. There is pallor.   Psychiatric: She has a normal mood and affect. Her behavior is normal. Judgment and thought content normal.       Assessment/Plan     Luz Elena was seen today for med refill, arm pain and leg pain.    Diagnoses and all orders for this visit:    Neck pain, chronic  -     MRI Cervical Spine Without Contrast; Future    Cervical radiculopathy  -     MRI Cervical Spine Without Contrast; Future    Localized edema  -     Duplex Venous Lower Extremity - Left CAR; Future    Pain of left lower extremity  -     Duplex Venous Lower Extremity - Left CAR; Future    Lumbar disc herniation  -     Ambulatory  Referral to Neurosurgery    Increase gabapentin to 800 mg 4 times daily 120 with 1 refill peer discussed abuse potential controlled substance as this medication.  Will contact patient with results of test after I receive them.  Advised patient go to ER if symptoms worsen acutely.  Discussed possible need for physical therapy referral in future if symptoms persist.

## 2020-05-12 ENCOUNTER — TELEPHONE (OUTPATIENT)
Dept: FAMILY MEDICINE CLINIC | Facility: CLINIC | Age: 50
End: 2020-05-12

## 2020-05-12 ENCOUNTER — HOSPITAL ENCOUNTER (OUTPATIENT)
Dept: MRI IMAGING | Facility: HOSPITAL | Age: 50
Discharge: HOME | End: 2020-05-12
Admitting: PHYSICIAN ASSISTANT

## 2020-05-12 DIAGNOSIS — M54.2 NECK PAIN, CHRONIC: ICD-10-CM

## 2020-05-12 DIAGNOSIS — M54.12 CERVICAL RADICULOPATHY: ICD-10-CM

## 2020-05-12 DIAGNOSIS — G89.29 NECK PAIN, CHRONIC: ICD-10-CM

## 2020-05-12 PROCEDURE — 72141 MRI NECK SPINE W/O DYE: CPT

## 2020-05-13 ENCOUNTER — TELEPHONE (OUTPATIENT)
Dept: FAMILY MEDICINE CLINIC | Facility: CLINIC | Age: 50
End: 2020-05-13

## 2020-05-13 NOTE — TELEPHONE ENCOUNTER
PT CALLED ASKING FOR A REFERRAL TO HAVE A VENOUS DOPPLER DONE BEFORE SHE GOES TO SEE DR. THORNTON (NEURO SURGEON) ON 5/21/2020. PLEASE ADVISE. THANKS

## 2020-05-13 NOTE — TELEPHONE ENCOUNTER
----- Message from Luz Elena Ballard sent at 5/13/2020 12:27 PM EDT -----  Regarding: Referral Request  Contact: [phone]  I also never got to see a psychiatrist. Can you refer me to one please.

## 2020-05-13 NOTE — TELEPHONE ENCOUNTER
Saida, we have been having issues getting this referral processed and Tessy Coyle (referral coordinator) is also having issues processing the venous doppler referral. Is there anyway you can look into this and help us figure this out?

## 2020-05-14 ENCOUNTER — TELEPHONE (OUTPATIENT)
Dept: FAMILY MEDICINE CLINIC | Facility: CLINIC | Age: 50
End: 2020-05-14

## 2020-05-14 DIAGNOSIS — M79.605 PAIN OF LEFT LOWER EXTREMITY: Primary | ICD-10-CM

## 2020-05-14 DIAGNOSIS — F32.A DEPRESSION, UNSPECIFIED DEPRESSION TYPE: Primary | ICD-10-CM

## 2020-05-18 ENCOUNTER — TELEPHONE (OUTPATIENT)
Dept: FAMILY MEDICINE CLINIC | Facility: CLINIC | Age: 50
End: 2020-05-18

## 2020-05-18 NOTE — TELEPHONE ENCOUNTER
----- Message from Luz Elena Ballard sent at 5/16/2020  7:25 PM EDT -----  Regarding: Test Results Question  Contact: [phone]  My test results for my neck I have not seen the results. Can you check on these please

## 2020-05-18 NOTE — TELEPHONE ENCOUNTER
----- Message from Luz Elena Ballard sent at 5/18/2020  2:01 PM EDT -----  Regarding: RE: Test Results Question  Contact: [phone]  I have already been set up for a neurosurgeon. I was referring to my test if my neck. It said something about more testing if that's where my problems were on my left side and it is.    ----- Message -----  From: YOLIS SAENZ  Sent: 5/18/20 1:55 PM  To: Luz Elena Ballard  Subject: RE: Test Results Question    C-spine shows mild to moderate degenerative changes with small herniated disks.  We will refer you to neurosurgery for consultation.    ----- Message -----     From: Luz Elena Ballard     Sent: 5/16/2020  7:25 PM EDT       To: Toña Beckford PA-C  Subject: Test Results Question    My test results for my neck I have not seen the results. Can you check on these please

## 2020-05-18 NOTE — TELEPHONE ENCOUNTER
Please let her know that I believe Dr. Willingham, neurosurgeon needs to look at the MRI results himself not just the report and have him give an opinion on whether her symptoms she is currently experiencing are due to the abnormalities seen in the MRI or if further test is needed.  He will be able to assess both her neck and back issues when he sees her and then we will go from there

## 2020-05-20 ENCOUNTER — HOSPITAL ENCOUNTER (OUTPATIENT)
Dept: CARDIOLOGY | Facility: HOSPITAL | Age: 50
Discharge: HOME | End: 2020-05-20
Admitting: PHYSICIAN ASSISTANT

## 2020-05-20 VITALS — BODY MASS INDEX: 27.6 KG/M2 | HEIGHT: 62 IN | WEIGHT: 150 LBS

## 2020-05-20 DIAGNOSIS — M79.605 PAIN OF LEFT LOWER EXTREMITY: ICD-10-CM

## 2020-05-20 LAB
BH CV ECHO MEAS - BSA(HAYCOCK): 1.7 M^2
BH CV ECHO MEAS - BSA: 1.7 M^2
BH CV ECHO MEAS - BZI_BMI: 27.4 KILOGRAMS/M^2
BH CV ECHO MEAS - BZI_METRIC_HEIGHT: 157.5 CM
BH CV ECHO MEAS - BZI_METRIC_WEIGHT: 68 KG
BH CV LOWER VASCULAR LEFT COMMON FEMORAL AUGMENT: NORMAL
BH CV LOWER VASCULAR LEFT COMMON FEMORAL COMPRESS: NORMAL
BH CV LOWER VASCULAR LEFT COMMON FEMORAL PHASIC: NORMAL
BH CV LOWER VASCULAR LEFT COMMON FEMORAL SPONT: NORMAL
BH CV LOWER VASCULAR LEFT DISTAL FEMORAL AUGMENT: NORMAL
BH CV LOWER VASCULAR LEFT DISTAL FEMORAL COMPRESS: NORMAL
BH CV LOWER VASCULAR LEFT DISTAL FEMORAL PHASIC: NORMAL
BH CV LOWER VASCULAR LEFT DISTAL FEMORAL SPONT: NORMAL
BH CV LOWER VASCULAR LEFT GASTRONEMIUS COMPRESS: NORMAL
BH CV LOWER VASCULAR LEFT GREATER SAPH AK COMPRESS: NORMAL
BH CV LOWER VASCULAR LEFT GREATER SAPH BK COMPRESS: NORMAL
BH CV LOWER VASCULAR LEFT LESSER SAPH COMPRESS: NORMAL
BH CV LOWER VASCULAR LEFT MID FEMORAL AUGMENT: NORMAL
BH CV LOWER VASCULAR LEFT MID FEMORAL COMPRESS: NORMAL
BH CV LOWER VASCULAR LEFT MID FEMORAL PHASIC: NORMAL
BH CV LOWER VASCULAR LEFT MID FEMORAL SPONT: NORMAL
BH CV LOWER VASCULAR LEFT PERONEAL AUGMENT: NORMAL
BH CV LOWER VASCULAR LEFT PERONEAL COMPRESS: NORMAL
BH CV LOWER VASCULAR LEFT POPLITEAL AUGMENT: NORMAL
BH CV LOWER VASCULAR LEFT POPLITEAL COMPRESS: NORMAL
BH CV LOWER VASCULAR LEFT POPLITEAL PHASIC: NORMAL
BH CV LOWER VASCULAR LEFT POPLITEAL SPONT: NORMAL
BH CV LOWER VASCULAR LEFT POSTERIOR TIBIAL AUGMENT: NORMAL
BH CV LOWER VASCULAR LEFT POSTERIOR TIBIAL COMPRESS: NORMAL
BH CV LOWER VASCULAR LEFT PROFUNDA FEMORAL AUGMENT: NORMAL
BH CV LOWER VASCULAR LEFT PROFUNDA FEMORAL COMPRESS: NORMAL
BH CV LOWER VASCULAR LEFT PROFUNDA FEMORAL PHASIC: NORMAL
BH CV LOWER VASCULAR LEFT PROFUNDA FEMORAL SPONT: NORMAL
BH CV LOWER VASCULAR LEFT PROXIMAL FEMORAL AUGMENT: NORMAL
BH CV LOWER VASCULAR LEFT PROXIMAL FEMORAL COMPRESS: NORMAL
BH CV LOWER VASCULAR LEFT PROXIMAL FEMORAL PHASIC: NORMAL
BH CV LOWER VASCULAR LEFT PROXIMAL FEMORAL SPONT: NORMAL
BH CV LOWER VASCULAR LEFT SAPHENOFEMORAL JUNCTION AUGMENT: NORMAL
BH CV LOWER VASCULAR LEFT SAPHENOFEMORAL JUNCTION COMPRESS: NORMAL
BH CV LOWER VASCULAR LEFT SAPHENOFEMORAL JUNCTION PHASIC: NORMAL
BH CV LOWER VASCULAR LEFT SAPHENOFEMORAL JUNCTION SPONT: NORMAL
BH CV LOWER VASCULAR RIGHT COMMON FEMORAL AUGMENT: NORMAL
BH CV LOWER VASCULAR RIGHT COMMON FEMORAL COMPRESS: NORMAL
BH CV LOWER VASCULAR RIGHT COMMON FEMORAL PHASIC: NORMAL
BH CV LOWER VASCULAR RIGHT COMMON FEMORAL SPONT: NORMAL

## 2020-05-20 PROCEDURE — 93971 EXTREMITY STUDY: CPT

## 2020-05-21 ENCOUNTER — OFFICE VISIT (OUTPATIENT)
Dept: NEUROSURGERY | Facility: CLINIC | Age: 50
End: 2020-05-21

## 2020-05-21 VITALS
TEMPERATURE: 97.3 F | HEIGHT: 62 IN | BODY MASS INDEX: 28.71 KG/M2 | DIASTOLIC BLOOD PRESSURE: 78 MMHG | WEIGHT: 156 LBS | SYSTOLIC BLOOD PRESSURE: 122 MMHG

## 2020-05-21 DIAGNOSIS — M51.36 DEGENERATIVE DISC DISEASE, LUMBAR: ICD-10-CM

## 2020-05-21 DIAGNOSIS — M50.30 DEGENERATIVE DISC DISEASE, CERVICAL: Primary | ICD-10-CM

## 2020-05-21 PROCEDURE — 99203 OFFICE O/P NEW LOW 30 MIN: CPT | Performed by: NEUROLOGICAL SURGERY

## 2020-05-21 RX ORDER — NABUMETONE 750 MG/1
750 TABLET, FILM COATED ORAL 2 TIMES DAILY
Qty: 60 TABLET | Refills: 0 | Status: SHIPPED | OUTPATIENT
Start: 2020-05-21 | End: 2020-05-28 | Stop reason: SINTOL

## 2020-05-21 RX ORDER — METHOCARBAMOL 750 MG/1
750 TABLET, FILM COATED ORAL NIGHTLY
Qty: 30 TABLET | Refills: 0 | Status: SHIPPED | OUTPATIENT
Start: 2020-05-21 | End: 2020-06-20

## 2020-05-21 NOTE — PROGRESS NOTES
Luz Elena Ballard  1970  [id number]      Chief Complaint   Patient presents with   • Extremity Weakness       HISTORY OF PRESENT ILLNESS: This is a 50-year-old female who has a history of chronic cervical and left arm pain numbness associated with severe back and left leg numbness.  She has pain in the cervical area rating to the left upper extremity and is somewhat ill-defined nondermatomal distribution.  Although she has some pain with movement of her shoulder it is not limiting.  Furthermore the pain in the cervical area is not debilitating.  Concurrent with the cervical discomfort she has pain in her left lower extremity which is worse with sitting standing and walking.    She has had a very thorough work-up thus far including EMG and NCV of the upper and lower extremities.  This study is normal.  She has had vascular studies done of her left lower extremity which likewise is normal showing no evidence of thrombosis.  Cervical MRI is reported to show degenerative osteoarthritic changes but with no high-grade spinal or lateral recess stenosis.  Lumbar MRI shows bulging of intervertebral disc but no significant central stenosis.    Past Medical History:   Diagnosis Date   • Arthritis     Hx of.   • Basal cell carcinoma     Hx of.   • Cellulitis     Hx of. Resolved 2/15/2016.   • Cellulitis of finger     History of Cellulitis Fingers Right Middle Finger. Resolved 2/15/2016   • Depression     Hx of.   • Fatty liver    • History of Blocked tear duct     Resolved 2/15/2016   • History of migraine headaches    • Hypercholesterolemia     Hx of.   • Infectious viral hepatitis     HEP C   • IV drug abuse (CMS/HCC)     Hx of. Resolved 2/12/2016.   • Obstructive sleep apnea     Hx of.   • Sciatica     Hx of. Resolved. 2/15/2016. Resolved. 7/21/2015.   • Sciatica    • Skin cancer     Hx of.   • Upper respiratory infection     Hx of. 2/15/2016       Past Surgical History:   Procedure Laterality Date   • BREAST SURGERY   2000    Reconstruction   • CHOLECYSTECTOMY  2000   • HYSTERECTOMY  08/06/2003   • OTHER SURGICAL HISTORY      Tubal Ligation   • REDUCTION MAMMAPLASTY Bilateral 2002   • TUBAL ABDOMINAL LIGATION  1992       Family History   Problem Relation Age of Onset   • Hypertension Mother    • Arthritis Mother    • Depression Mother    • Anxiety disorder Father    • Suicidality Father    • Depression Sister    • Diabetes Maternal Grandmother    • Breast cancer Neg Hx    • Ovarian cancer Neg Hx        Social History     Socioeconomic History   • Marital status:      Spouse name: Not on file   • Number of children: Not on file   • Years of education: Not on file   • Highest education level: Not on file   Tobacco Use   • Smoking status: Light Tobacco Smoker   • Smokeless tobacco: Never Used   • Tobacco comment: Smokes less than half pack per week   Substance and Sexual Activity   • Alcohol use: No   • Drug use: Yes     Types: IV, Heroin   • Sexual activity: Yes     Partners: Male       Allergies   Allergen Reactions   • Sulfa Antibiotics      Sulfa drugs   • Vancomycin Rash         Current Outpatient Medications:   •  albuterol sulfate  (90 Base) MCG/ACT inhaler, Inhale 2 puffs Every 4 (Four) Hours As Needed for Wheezing., Disp: 1 inhaler, Rfl: 0  •  azelastine (OPTIVAR) 0.05 % ophthalmic solution, Administer 1 drop to both eyes Daily. As needed for allergic eye sx, Disp: 6 mL, Rfl: 12  •  gabapentin (NEURONTIN) 800 MG tablet, Take 1 tablet by mouth 4 (Four) Times a Day. For neuropathy, New dose, Disp: 120 tablet, Rfl: 1  •  lisinopril (PRINIVIL,ZESTRIL) 20 MG tablet, Take 1 tablet by mouth Daily., Disp: 30 tablet, Rfl: 11  •  ondansetron ODT (ZOFRAN-ODT) 8 MG disintegrating tablet, Place 1 tablet under the tongue 3 (Three) Times a Day., Disp: 15 tablet, Rfl: 11    Review of Systems   Constitutional: Positive for diaphoresis and fatigue. Negative for activity change, appetite change, chills, fever and unexpected  "weight change.   HENT: Negative for congestion, dental problem, drooling, ear discharge, ear pain, facial swelling, hearing loss, mouth sores, nosebleeds, postnasal drip, rhinorrhea, sinus pressure, sneezing, sore throat, tinnitus, trouble swallowing and voice change.    Eyes: Negative for photophobia, pain, discharge, redness, itching and visual disturbance.   Respiratory: Positive for shortness of breath. Negative for apnea, cough, choking, chest tightness, wheezing and stridor.    Cardiovascular: Negative for chest pain, palpitations and leg swelling.   Gastrointestinal: Positive for abdominal pain and nausea. Negative for abdominal distention, anal bleeding, blood in stool, constipation, diarrhea, rectal pain and vomiting.   Endocrine: Negative for cold intolerance, heat intolerance, polydipsia, polyphagia and polyuria.   Genitourinary: Negative for decreased urine volume, difficulty urinating, dysuria, enuresis, flank pain, frequency, genital sores, hematuria and urgency.   Musculoskeletal: Positive for arthralgias, back pain, joint swelling, myalgias, neck pain and neck stiffness. Negative for gait problem.   Skin: Negative for color change, pallor, rash and wound.   Allergic/Immunologic: Negative for environmental allergies, food allergies and immunocompromised state.   Neurological: Positive for dizziness, syncope, weakness and light-headedness. Negative for tremors, seizures, facial asymmetry, speech difficulty, numbness and headaches.   Hematological: Negative for adenopathy. Does not bruise/bleed easily.   Psychiatric/Behavioral: Positive for dysphoric mood and sleep disturbance. Negative for agitation, behavioral problems, confusion, decreased concentration, hallucinations, self-injury and suicidal ideas. The patient is nervous/anxious. The patient is not hyperactive.        Vitals:    05/21/20 1339   Height: 157.5 cm (62\")       Neurological Examination:    Mental status/speech: The patient is alert and " oriented.  Speech is clear without aphysia or dysarthria.  No overt cognitive deficits.    Cranial nerve examination:    Olfaction: Smell is intact.  Vision: Vision is intact without visual field abnormalities.  Funduscopic examination is normal.  No pupillary irregularity.  Ocular motor examination: The extraocular muscles are intact.  There is no diplopia.  The pupil is round and reactive to both light and accommodation.  There is no nystagmus.  Facial movement/sensation: There is no facial weakness.  Sensation is intact in the first, second, and third divisions of the trigeminal nerve.  The corneal reflex is intact.  Auditory: Hearing is intact to finger rub bilaterally.  Cranial nerves IX, X, XI, XII: Phonation is normal.  No dysphagia.  Tongue is protruded in the midline without atrophy.  The gag reflex is intact.  Shoulder shrug is normal.    Musculoligamentous ligamentous examination: She has limited range of motion of the cervical lumbar spine.  However straight leg raising, Lasegue's and flip test are negative.  No Babinski Kathy or clonus.  I am unable to find any evidence of weakness or sensory loss in the upper extremities.  She has mild limitation of range of motion of her left shoulder.          Medical Decision Making:     Diagnostic Data Set: Review of the data set shows the presence of degenerative osteoarthritic changes in the cervical and lumbar area which is relatively fused.  However there is no high-grade spinal stenosis or instability.      Assessment: Symptomatic diffuse, advanced degenerative osteoarthritis of the spine          Recommendations: The condition that she has is inoperable to the context that there is no operation is going to effect an improvement or limit her pain.  She does not have high-grade spinal or lateral recess stenosis.  She has no evidence of a herniation of intervertebral disc.  I have provided a prescription of Relafen 750 mg twice daily and Robaxin-750 2 g at  night.  She will call me in 3 weeks this the issues that she has does not lend itself nor would be expected to improve with any surgical intervention.  Unfortunately there is little to offer her from my perspective.  The issue that she has unfortunately will be permanent.  Referral to pain management he is a therapeutic option.        I greatly appreciate the opportunity to see and evaluate this individual.  If you have questions or concerns regarding issues that I may have overlooked please call me at any time: [phone].  Mick Willingham M.D.  [facility]  [address] [place] [address].  [facility]  [id number]      I have received verbal consent from the patient to receive care via telehealth.

## 2020-05-21 NOTE — PATIENT INSTRUCTIONS
Call Dr. Willingham on a Monday or Tuesday and leave a message.     He will call you back at the end of the day as soon as he can.     [phone] [facility]   [phone] - [place]  [phone] - [place]      Call 3 weeks

## 2020-05-28 DIAGNOSIS — M50.30 DEGENERATIVE DISC DISEASE, CERVICAL: Primary | ICD-10-CM

## 2020-05-28 RX ORDER — MELOXICAM 15 MG/1
15 TABLET ORAL DAILY
Qty: 30 TABLET | Refills: 0 | OUTPATIENT
Start: 2020-05-28 | End: 2020-08-11

## 2020-05-28 NOTE — TELEPHONE ENCOUNTER
Pt called to give update on medication: state it makes her sick to her stomach and she is ready to see a PM doctor, needs referral. She also states she will be applying for disability and sending us paperwork for that.

## 2020-05-28 NOTE — TELEPHONE ENCOUNTER
Pm referral signed.     Is there a certain medication that is making her sick? Does she want a different medication?

## 2020-05-28 NOTE — TELEPHONE ENCOUNTER
Pt states that the Relafen is making her sick. I advised her to d/c that.   Do you want to try Mobic? Pt aware that this may have the same effect.

## 2020-06-04 ENCOUNTER — TELEPHONE (OUTPATIENT)
Dept: NEUROSURGERY | Facility: CLINIC | Age: 50
End: 2020-06-04

## 2020-06-04 ENCOUNTER — PATIENT MESSAGE (OUTPATIENT)
Dept: NEUROSURGERY | Facility: CLINIC | Age: 50
End: 2020-06-04

## 2020-06-04 NOTE — TELEPHONE ENCOUNTER
----- Message from Luz Elena Ballard sent at 6/4/2020  2:57 PM EDT -----  Regarding: Non-Urgent Medical Question  Contact: [phone]  Since you will be retiring in June. Can you make sure it is noted in my chart that I cannot work. You did mention that there was no fixing you could do. I have filled out my disability and you should be hearing something soon.i was just gonna ask if you would definitely mention me not working in my chart in case you were no longer there when the paperwork was filled out for disability. Thank you Luz Elena Ballard [birth year]

## 2020-06-08 NOTE — TELEPHONE ENCOUNTER
I have talked with the patient. She has filed for her disability. She may need to have FCE. Paper work to come?

## 2020-06-16 ENCOUNTER — TELEPHONE (OUTPATIENT)
Dept: FAMILY MEDICINE CLINIC | Facility: CLINIC | Age: 50
End: 2020-06-16

## 2020-06-16 RX ORDER — FLUCONAZOLE 200 MG/1
200 TABLET ORAL DAILY
Qty: 1 TABLET | Refills: 1 | OUTPATIENT
Start: 2020-06-16 | End: 2020-08-11

## 2020-06-22 ENCOUNTER — PATIENT MESSAGE (OUTPATIENT)
Dept: NEUROSURGERY | Facility: CLINIC | Age: 50
End: 2020-06-22

## 2020-07-01 RX ORDER — ALBUTEROL SULFATE 90 UG/1
AEROSOL, METERED RESPIRATORY (INHALATION)
Qty: 18 G | Refills: 2 | Status: SHIPPED | OUTPATIENT
Start: 2020-07-01 | End: 2021-09-21 | Stop reason: SDUPTHER

## 2020-07-10 DIAGNOSIS — M54.12 CERVICAL RADICULOPATHY: ICD-10-CM

## 2020-07-10 RX ORDER — GABAPENTIN 800 MG/1
800 TABLET ORAL 4 TIMES DAILY
Qty: 120 TABLET | Refills: 1 | Status: CANCELLED | OUTPATIENT
Start: 2020-07-10

## 2020-07-10 RX ORDER — GABAPENTIN 800 MG/1
TABLET ORAL
Qty: 120 TABLET | OUTPATIENT
Start: 2020-07-10

## 2020-07-10 NOTE — TELEPHONE ENCOUNTER
Caller: Luz Elena Ballard    Relationship: Self    Best call back number: [phone]    Medication needed:   Requested Prescriptions     Pending Prescriptions Disp Refills   • gabapentin (NEURONTIN) 800 MG tablet [Pharmacy Med Name: GABAPENTIN 800MG TABLETS] 120 tablet      Sig: TAKE 1 TABLET BY MOUTH FOUR TIMES DAILY       When do you need the refill by: 07/10/2020    What details did the patient provide when requesting the medication: OUT OF MEDICATION      What is the patient's preferred pharmacy: [facility] #[id number] [address] AT [facility] & [facility] [phone] [place] [phone] FX     PLEASE CALL PATIENT AND CONFIRM  [phone]

## 2020-07-13 ENCOUNTER — PATIENT MESSAGE (OUTPATIENT)
Dept: NEUROSURGERY | Facility: CLINIC | Age: 50
End: 2020-07-13

## 2020-07-13 ENCOUNTER — TELEMEDICINE (OUTPATIENT)
Dept: FAMILY MEDICINE CLINIC | Facility: CLINIC | Age: 50
End: 2020-07-13

## 2020-07-13 DIAGNOSIS — F32.A DEPRESSIVE DISORDER: ICD-10-CM

## 2020-07-13 DIAGNOSIS — M54.12 CERVICAL RADICULOPATHY: ICD-10-CM

## 2020-07-13 DIAGNOSIS — M54.12 CERVICAL RADICULOPATHY: Primary | ICD-10-CM

## 2020-07-13 DIAGNOSIS — G62.2 POLYNEUROPATHY DUE TO OTHER TOXIC AGENTS (HCC): ICD-10-CM

## 2020-07-13 PROCEDURE — 99213 OFFICE O/P EST LOW 20 MIN: CPT | Performed by: PHYSICIAN ASSISTANT

## 2020-07-13 RX ORDER — DULOXETIN HYDROCHLORIDE 30 MG/1
30 CAPSULE, DELAYED RELEASE ORAL DAILY
Qty: 30 CAPSULE | Refills: 2 | Status: SHIPPED | OUTPATIENT
Start: 2020-07-13 | End: 2020-07-27 | Stop reason: DRUGHIGH

## 2020-07-13 RX ORDER — GABAPENTIN 800 MG/1
800 TABLET ORAL 4 TIMES DAILY
Qty: 120 TABLET | Refills: 5 | OUTPATIENT
Start: 2020-07-13 | End: 2021-01-12 | Stop reason: SDUPTHER

## 2020-07-13 NOTE — PROGRESS NOTES
Subjective   Luz Elena Ballard is a 50 y.o. female  Med Refill      History of Present Illness  Patient is a pleasant 50-year-old white female who presents today via video visit and her consent for this to be her office visit.  She is following up on chronic polyneuropathy as well as depression.  She states that her neuropathy is currently stable on gabapentin, she is due for refills.  She states her depression is not well controlled at this time and she would like to try going back on Cymbalta.  He says she tried it once before but did not stay on it long up to make a difference.  She denied having adverse effect when she tried in the past.  She states she is struggling with depression currently.  Denies any homicidal or suicidal ideations.  Mainly struggling with low mood and effects of chronic pain.  Video visit lasted 15 minutes  The following portions of the patient's history were reviewed and updated as appropriate: allergies, current medications, past social history and problem list    Review of Systems   Constitutional: Negative for appetite change and fatigue.   Respiratory: Negative.  Negative for chest tightness and shortness of breath.    Cardiovascular: Negative.    Gastrointestinal: Negative for abdominal pain, diarrhea and nausea.   Musculoskeletal: Positive for arthralgias and myalgias.   Neurological: Positive for numbness. Negative for dizziness, tremors, weakness, light-headedness and headaches.   Psychiatric/Behavioral: Positive for dysphoric mood and sleep disturbance. Negative for agitation, behavioral problems, confusion, decreased concentration and suicidal ideas. The patient is nervous/anxious.        Objective     There were no vitals filed for this visit.    Physical Exam   Constitutional: She is oriented to person, place, and time. She appears well-developed and well-nourished. She does not have a sickly appearance. She does not appear ill. No distress.   HENT:   Head: Normocephalic and  atraumatic.   Neck: No thyroid mass and no thyromegaly present.   Pulmonary/Chest: Effort normal. No respiratory distress.   Neurological: She is alert and oriented to person, place, and time.   Skin: No rash noted. She is not diaphoretic. No erythema. No pallor.   Psychiatric: Her speech is normal and behavior is normal. Judgment and thought content normal. Her mood appears not anxious. Her affect is not angry and not inappropriate. Cognition and memory are normal. She exhibits a depressed mood.   Patient appears well-groomed, conversational but mood seems mildly depressed She is attentive.       Assessment/Plan     Luz Elena was seen today for med refill.    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 For depression    Prescription will be sent to patient's pharmacy for current dosage of gabapentin 800 mg 1 tablet 4 times daily for neuropathy #120 with 5 refills.  Discussed abuse potential controlled substance status of this medication.  Follow-up in 6 months for neuropathy, follow-up in 2 weeks if unimproved on Cymbalta for symptoms of depression peer

## 2020-07-27 ENCOUNTER — TELEPHONE (OUTPATIENT)
Dept: FAMILY MEDICINE CLINIC | Facility: CLINIC | Age: 50
End: 2020-07-27

## 2020-07-27 RX ORDER — DULOXETIN HYDROCHLORIDE 60 MG/1
60 CAPSULE, DELAYED RELEASE ORAL DAILY
Qty: 30 CAPSULE | Refills: 5 | Status: SHIPPED | OUTPATIENT
Start: 2020-07-27 | End: 2021-02-17 | Stop reason: SDUPTHER

## 2020-07-27 NOTE — TELEPHONE ENCOUNTER
----- Message from Luz Elena Ballard sent at 7/26/2020  9:46 PM EDT -----  Regarding: Prescription Question  Contact: [phone]  I increased my cymbalta 30mg qd to bid daily as of Friday July 24th need a new script reflecting the change of cymbalta 60 mg qd to [facility]. [phone]  Thank You  Luz Elena Ballard

## 2020-08-11 PROCEDURE — 87086 URINE CULTURE/COLONY COUNT: CPT | Performed by: NURSE PRACTITIONER

## 2020-08-12 ENCOUNTER — TELEPHONE (OUTPATIENT)
Dept: URGENT CARE | Facility: CLINIC | Age: 50
End: 2020-08-12

## 2020-08-15 ENCOUNTER — TELEPHONE (OUTPATIENT)
Dept: URGENT CARE | Facility: CLINIC | Age: 50
End: 2020-08-15

## 2020-08-25 DIAGNOSIS — I10 ESSENTIAL HYPERTENSION: Chronic | ICD-10-CM

## 2020-08-26 RX ORDER — LISINOPRIL 20 MG/1
TABLET ORAL
Qty: 30 TABLET | Refills: 3 | Status: SHIPPED | OUTPATIENT
Start: 2020-08-26 | End: 2021-01-29

## 2020-09-10 DIAGNOSIS — Z00.8 PRE-SURGICAL PSYCHOLOGICAL ASSESSMENT, ENCOUNTER FOR: Primary | ICD-10-CM

## 2020-10-13 NOTE — TELEPHONE ENCOUNTER
Caller: Luz Elena Ballard    Relationship: Self    Best call back number: [phone]     Medication needed:   Requested Prescriptions     Pending Prescriptions Disp Refills   • azelastine (OPTIVAR) 0.05 % ophthalmic solution 6 mL 12     Sig: Administer 1 drop to both eyes Daily. As needed for allergic eye sx       When do you need the refill by: 10/13/2020    What details did the patient provide when requesting the medication: PATIENT IS OUT OF MEDICATION     Does the patient have less than a 3 day supply:  [x] Yes  [] No    What is the patient's preferred pharmacy: [facility] #[id number] [address] AT [facility] & [facility] [phone] [facility] [phone] FX

## 2020-10-14 RX ORDER — AZELASTINE HYDROCHLORIDE 0.5 MG/ML
SOLUTION/ DROPS OPHTHALMIC
Qty: 6 ML | Refills: 12 | OUTPATIENT
Start: 2020-10-14 | End: 2020-11-24

## 2020-10-15 RX ORDER — AZELASTINE HYDROCHLORIDE 0.5 MG/ML
1 SOLUTION/ DROPS OPHTHALMIC DAILY
Qty: 6 ML | Refills: 12 | Status: SHIPPED | OUTPATIENT
Start: 2020-10-15 | End: 2022-07-27 | Stop reason: SDUPTHER

## 2020-10-19 PROCEDURE — 87086 URINE CULTURE/COLONY COUNT: CPT | Performed by: PHYSICIAN ASSISTANT

## 2020-10-20 ENCOUNTER — TELEPHONE (OUTPATIENT)
Dept: FAMILY MEDICINE CLINIC | Facility: CLINIC | Age: 50
End: 2020-10-20

## 2020-10-20 DIAGNOSIS — E11.9 DIABETES MELLITUS TYPE 2 IN NONOBESE (HCC): Primary | ICD-10-CM

## 2020-10-20 DIAGNOSIS — R53.83 FATIGUE, UNSPECIFIED TYPE: ICD-10-CM

## 2020-10-20 NOTE — TELEPHONE ENCOUNTER
PATIENT WENT TO URGENT CARE AT [facility] AND SAID SHE WAS DIAGNOSED WITH DIABETES AND NEEDED TO START INSULIN RIGHT AWAY; THERE WERE NO AVAILABLE APPTS FOR TODAY; PLEASE ADVISE AND CALL PATIENT    MARK: [phone]

## 2020-10-20 NOTE — TELEPHONE ENCOUNTER
I have reviewed the notes from urgent care.  More testing is needed to determine proper treatment.  I have placed lab orders that I want her to go to the [facility] lab today and have drawn, I want her to immediately eliminate all sugar from her diet and count carbs maximum 40 carbs per meal and see me Friday 11:00 please put her in that opening.

## 2020-10-21 ENCOUNTER — TELEPHONE (OUTPATIENT)
Dept: URGENT CARE | Facility: CLINIC | Age: 50
End: 2020-10-21

## 2020-11-02 ENCOUNTER — TELEPHONE (OUTPATIENT)
Dept: FAMILY MEDICINE CLINIC | Facility: CLINIC | Age: 50
End: 2020-11-02

## 2020-11-05 ENCOUNTER — OFFICE VISIT (OUTPATIENT)
Dept: FAMILY MEDICINE CLINIC | Facility: CLINIC | Age: 50
End: 2020-11-05

## 2020-11-05 VITALS — HEIGHT: 62 IN | WEIGHT: 151 LBS | BODY MASS INDEX: 27.79 KG/M2

## 2020-11-05 DIAGNOSIS — E11.69 TYPE 2 DIABETES MELLITUS WITH OTHER SPECIFIED COMPLICATION, WITHOUT LONG-TERM CURRENT USE OF INSULIN (HCC): Primary | ICD-10-CM

## 2020-11-05 LAB
GLUCOSE BLDC GLUCOMTR-MCNC: 160 MG/DL (ref 70–130)
HBA1C MFR BLD: 7.6 %

## 2020-11-05 PROCEDURE — 99213 OFFICE O/P EST LOW 20 MIN: CPT | Performed by: PHYSICIAN ASSISTANT

## 2020-11-05 PROCEDURE — 82962 GLUCOSE BLOOD TEST: CPT | Performed by: PHYSICIAN ASSISTANT

## 2020-11-05 PROCEDURE — 83036 HEMOGLOBIN GLYCOSYLATED A1C: CPT | Performed by: PHYSICIAN ASSISTANT

## 2020-11-05 RX ORDER — METFORMIN HYDROCHLORIDE 500 MG/1
500 TABLET, EXTENDED RELEASE ORAL
Qty: 30 TABLET | Refills: 2 | Status: SHIPPED | OUTPATIENT
Start: 2020-11-05 | End: 2021-03-03

## 2020-11-05 NOTE — PROGRESS NOTES
Subjective   Luz Elena Ballard is a 50 y.o. female  Diabetes (Follow up on diabetes )      History of Present Illness  Patient is a pleasant 50-year-old white female who comes in today for follow-up on recent blood sugar elevation noted at the urgent care on October 19 when she presented for UTI.  Patient has no history of diabetes but states it does run in her family.  She was told her blood sugar was over 300.  She states that she really has not changed her diet.  She states that she does have trouble with blurry vision lately and at times and burning in her feet.  No urinary problems today.  Patient has an appointment for an eye exam tomorrow.  The following portions of the patient's history were reviewed and updated as appropriate: allergies, current medications, past social history and problem list    Review of Systems   Constitutional: Negative for appetite change, diaphoresis, fatigue and unexpected weight change.   Eyes: Positive for visual disturbance.   Respiratory: Negative for chest tightness and shortness of breath.    Cardiovascular: Negative for chest pain, palpitations and leg swelling.   Gastrointestinal: Negative for diarrhea, nausea and vomiting.   Endocrine: Negative for polydipsia, polyphagia and polyuria.   Skin: Negative for color change.   Neurological: Negative for dizziness, weakness, light-headedness and numbness.       Objective     There were no vitals filed for this visit.    Physical Exam  Vitals signs and nursing note reviewed.   Constitutional:       General: She is not in acute distress.     Appearance: Normal appearance. She is well-developed. She is not ill-appearing, toxic-appearing or diaphoretic.   HENT:      Head: Normocephalic and atraumatic.   Eyes:      Conjunctiva/sclera: Conjunctivae normal.   Neck:      Vascular: No JVD.   Cardiovascular:      Rate and Rhythm: Normal rate and regular rhythm.      Pulses:           Dorsalis pedis pulses are 2+ on the right side and 2+ on  the left side.        Posterior tibial pulses are 2+ on the right side and 2+ on the left side.      Heart sounds: Normal heart sounds. No murmur.   Pulmonary:      Effort: Pulmonary effort is normal. No respiratory distress.      Breath sounds: Normal breath sounds.   Abdominal:      General: There is no distension.      Palpations: Abdomen is soft.      Tenderness: There is no abdominal tenderness.   Skin:     General: Skin is warm and dry.      Coloration: Skin is not pale.      Findings: No erythema or rash.   Neurological:      Mental Status: She is alert.      Coordination: Coordination normal.   Psychiatric:         Mood and Affect: Mood normal.         Behavior: Behavior normal.         Thought Content: Thought content normal.         Judgment: Judgment normal.         Assessment/Plan     Diagnoses and all orders for this visit:    1. Type 2 diabetes mellitus with other specified complication, without long-term current use of insulin (CMS/[facility]) (Primary)  -     POCT Glucose  -     POC Glycosylated Hemoglobin (Hb A1C)  -     Ambulatory Referral to Nutrition Services    Other orders  -     metFORMIN ER (GLUCOPHAGE-XR) 500 MG 24 hr tablet; Take 1 tablet by mouth Daily Before Supper.  Dispense: 30 tablet; Refill: 2     + A1c of 7.2 discussed with patient diagnosis of diabetes at 6.5 and goal to have A1c less than 7.  Will refer patient to dietitian she will start reducing her carb count down to 40 carbs per meal start on Metformin and follow-up in 12 weeks for recheck.  Patient with eyes examined in 1 month.

## 2020-11-24 PROCEDURE — 87086 URINE CULTURE/COLONY COUNT: CPT | Performed by: NURSE PRACTITIONER

## 2020-11-24 PROCEDURE — U0004 COV-19 TEST NON-CDC HGH THRU: HCPCS | Performed by: NURSE PRACTITIONER

## 2020-11-27 ENCOUNTER — TELEPHONE (OUTPATIENT)
Dept: URGENT CARE | Facility: CLINIC | Age: 50
End: 2020-11-27

## 2020-11-27 DIAGNOSIS — R05.9 COUGH: Primary | ICD-10-CM

## 2020-11-27 RX ORDER — FLUCONAZOLE 150 MG/1
150 TABLET ORAL ONCE
Qty: 1 TABLET | Refills: 0 | Status: SHIPPED | OUTPATIENT
Start: 2020-11-27 | End: 2020-11-27

## 2020-11-27 NOTE — TELEPHONE ENCOUNTER
Patient calls with complaints of yeast infection in mouth after previous antibiotic use. New rx new sent to pharmacy.

## 2020-12-04 ENCOUNTER — TELEPHONE (OUTPATIENT)
Dept: FAMILY MEDICINE CLINIC | Facility: CLINIC | Age: 50
End: 2020-12-04

## 2020-12-04 RX ORDER — ACYCLOVIR 400 MG/1
400 TABLET ORAL 3 TIMES DAILY
Qty: 21 TABLET | Refills: 11 | Status: SHIPPED | OUTPATIENT
Start: 2020-12-04 | End: 2021-07-19

## 2020-12-04 NOTE — TELEPHONE ENCOUNTER
----- Message from Luz Elena Ballard sent at 12/4/2020  6:31 AM EST -----  Regarding: Non-Urgent Medical Question  Contact: [phone]  Tom Madrigal I needed to ask if I need to see you or you can just call in some medicine. I have a Herpes breakout. This time it came to my mouth in the form of fever blisters first. It's already jonna better just was there couple days treated it otc. Now I have them on my female part. Blisters extreme pain. My new phone number is [phone]. Have you nurse let me know what to do. My pharmacy is [facility] [phone]. SEdilberto [facility] Hernán Isbell

## 2020-12-14 ENCOUNTER — TELEPHONE (OUTPATIENT)
Dept: FAMILY MEDICINE CLINIC | Facility: CLINIC | Age: 50
End: 2020-12-14

## 2020-12-14 RX ORDER — FLUCONAZOLE 200 MG/1
TABLET ORAL
Qty: 2 TABLET | Refills: 0 | Status: SHIPPED | OUTPATIENT
Start: 2020-12-14 | End: 2021-07-19

## 2020-12-14 NOTE — TELEPHONE ENCOUNTER
----- Message from Luz Elena Ballard sent at 12/14/2020  1:00 PM EST -----  Regarding: Prescription Question  Contact: [phone]  I have finished my acyclovir and I'm itching to death. I thought I had a refill on the diflucan but they said I don't. I need 2 of the 200mg called into [facility] [place]. [phone]

## 2020-12-28 DIAGNOSIS — M54.12 CERVICAL RADICULOPATHY: ICD-10-CM

## 2020-12-28 RX ORDER — GABAPENTIN 800 MG/1
800 TABLET ORAL 4 TIMES DAILY
Qty: 120 TABLET | Refills: 5 | OUTPATIENT
Start: 2020-12-28

## 2020-12-28 NOTE — TELEPHONE ENCOUNTER
Caller: Luz Elena Ballard    Relationship: Self    Best call back number: [phone]    Medication needed:   Requested Prescriptions     Pending Prescriptions Disp Refills   • gabapentin (NEURONTIN) 800 MG tablet 120 tablet 5     Sig: Take 1 tablet by mouth 4 (Four) Times a Day. For neuropathy, New dose       When do you need the refill by: 12/28/2020    What details did the patient provide when requesting the medication: PATIENT IS COMPLETLEY OUT     Does the patient have less than a 3 day supply:  [x] Yes  [] No    What is the patient's preferred pharmacy: [facility] #[id number] [address] AT [facility] & [facility] [phone] [facility] [phone] FX

## 2020-12-30 RX ORDER — GABAPENTIN 800 MG/1
800 TABLET ORAL 4 TIMES DAILY
Qty: 120 TABLET | Refills: 5 | OUTPATIENT
Start: 2020-12-30

## 2021-01-12 DIAGNOSIS — M54.12 CERVICAL RADICULOPATHY: ICD-10-CM

## 2021-01-12 RX ORDER — GABAPENTIN 800 MG/1
800 TABLET ORAL 4 TIMES DAILY
Qty: 120 TABLET | Refills: 0 | Status: SHIPPED | OUTPATIENT
Start: 2021-01-12 | End: 2021-02-17 | Stop reason: SDUPTHER

## 2021-01-29 DIAGNOSIS — R11.0 CHRONIC NAUSEA: ICD-10-CM

## 2021-01-29 DIAGNOSIS — I10 ESSENTIAL HYPERTENSION: Chronic | ICD-10-CM

## 2021-01-29 RX ORDER — ONDANSETRON 8 MG/1
TABLET, ORALLY DISINTEGRATING ORAL
Qty: 15 TABLET | Refills: 3 | Status: SHIPPED | OUTPATIENT
Start: 2021-01-29 | End: 2021-12-08 | Stop reason: SDUPTHER

## 2021-01-29 RX ORDER — LISINOPRIL 20 MG/1
TABLET ORAL
Qty: 30 TABLET | Refills: 3 | Status: SHIPPED | OUTPATIENT
Start: 2021-01-29 | End: 2021-07-01 | Stop reason: SDUPTHER

## 2021-02-05 DIAGNOSIS — M54.12 CERVICAL RADICULOPATHY: ICD-10-CM

## 2021-02-05 RX ORDER — GABAPENTIN 800 MG/1
800 TABLET ORAL 4 TIMES DAILY
Qty: 120 TABLET | Refills: 0 | OUTPATIENT
Start: 2021-02-05

## 2021-02-05 NOTE — TELEPHONE ENCOUNTER
Caller: Luz Elena Ballard    Relationship: Self    Best call back number: [phone]   Medication needed:   Requested Prescriptions     Pending Prescriptions Disp Refills   • gabapentin (NEURONTIN) 800 MG tablet 120 tablet 0     Sig: Take 1 tablet by mouth 4 (Four) Times a Day. For neuropathy       When do you need the refill by: ASAP    What details did the patient provide when requesting the medication: HAS 2 DAYS LEFT   Does the patient have less than a 3 day supply:  [x] Yes  [] No    What is the patient's preferred pharmacy: [facility] #[id number] [address] AT [facility] & [facility] [phone] [facility] [phone]

## 2021-02-17 ENCOUNTER — TELEMEDICINE (OUTPATIENT)
Dept: FAMILY MEDICINE CLINIC | Facility: CLINIC | Age: 51
End: 2021-02-17

## 2021-02-17 DIAGNOSIS — F32.89 OTHER DEPRESSION: ICD-10-CM

## 2021-02-17 DIAGNOSIS — G62.2 POLYNEUROPATHY DUE TO OTHER TOXIC AGENTS (HCC): ICD-10-CM

## 2021-02-17 DIAGNOSIS — M54.12 CERVICAL RADICULOPATHY: ICD-10-CM

## 2021-02-17 DIAGNOSIS — F41.1 GAD (GENERALIZED ANXIETY DISORDER): ICD-10-CM

## 2021-02-17 DIAGNOSIS — H53.8 BLURRED VISION, BILATERAL: ICD-10-CM

## 2021-02-17 DIAGNOSIS — E11.65 TYPE 2 DIABETES MELLITUS WITH HYPERGLYCEMIA, WITHOUT LONG-TERM CURRENT USE OF INSULIN (HCC): Primary | ICD-10-CM

## 2021-02-17 PROCEDURE — 99213 OFFICE O/P EST LOW 20 MIN: CPT | Performed by: PHYSICIAN ASSISTANT

## 2021-02-17 RX ORDER — GABAPENTIN 800 MG/1
800 TABLET ORAL 4 TIMES DAILY
Qty: 120 TABLET | Refills: 5 | Status: SHIPPED | OUTPATIENT
Start: 2021-02-17 | End: 2021-11-29

## 2021-02-17 RX ORDER — DULOXETIN HYDROCHLORIDE 60 MG/1
60 CAPSULE, DELAYED RELEASE ORAL DAILY
Qty: 30 CAPSULE | Refills: 11 | Status: SHIPPED | OUTPATIENT
Start: 2021-02-17 | End: 2021-07-19 | Stop reason: SDUPTHER

## 2021-02-17 NOTE — PROGRESS NOTES
"Subjective   Luz Elena Ballard is a 50 y.o. female  Diabetes      History of Present Illness  Patient is a pleasant 50-year-old white female who presents today via video visit after giving her consent for this to be her office visit.  She is following up on depression, generalized anxiety disorder, diabetes mellitus as well as chronic peripheral neuropathy.  Peripheral neuropathy currently stable on gabapentin, controlled substance and due for refills of this medication.  Please see previous office notes and labs.  Additionally, patient is following up on recent COVID-19 infection.  Patient's last A1c was in November, was 7.6 see labs in chart.  She is due to have labs rechecked.  Regarding her diabetes she does not check blood sugar at home but states that she has not had any further \"spells\" since she was last seen.  She states prior to starting on gabapentin she was having episodes where she felt her blood sugar was spiking and going down where she would feel dizzy and lightheaded this is no longer occurring.  She denies any adverse effects from Metformin.  However, she states one of the original symptoms she had prior to her diabetes diagnosis was blurred vision and this has continued.  She states that even though she feels her diabetes is better controlled she has continued to have problems where her vision is blurry and she has trouble with focus both up close and distance.  This has been a chronic symptom for over 3 months no acute changes within the last month.  Denies any eye pain denies eye drainage.  Patient states her vision did not change throughout the course of having active Covid 19 infection.  She states she and her whole family came down with COVID-19 within the last month, she states that she had worse symptoms than the rest of her family and she is just starting to feel like she is cleared.  The majority of her symptoms were GI in nature she states she had a headache nausea diarrhea and abdominal " pain with significant fatigue.  She did not experience shortness of breath chest pain and is currently feeling no GI symptoms, no chest pain no shortness of breath and no fever.  Video visit 25 minutes in length.  The following portions of the patient's history were reviewed and updated as appropriate: allergies, current medications, past social history and problem list    Review of Systems   Constitutional: Positive for fatigue.   Eyes: Positive for visual disturbance. Negative for photophobia, pain, discharge, redness and itching.   Respiratory: Negative.    Cardiovascular: Negative for chest pain.   Gastrointestinal: Negative.    Endocrine: Positive for polydipsia. Negative for polyphagia and polyuria.   Musculoskeletal: Negative.    Skin: Negative for pallor.   Neurological: Positive for numbness ( Neuropathy stable on gabapentin). Negative for dizziness, tremors, seizures, speech difficulty, weakness and headaches.   Psychiatric/Behavioral: Negative for confusion. Dysphoric mood:  Stable on medication. The patient is nervous/anxious ( Currently stable on medication due for refills).        Objective     There were no vitals filed for this visit.    Physical Exam  Constitutional:       General: She is not in acute distress.     Appearance: Normal appearance. She is well-developed. She is not ill-appearing, toxic-appearing or diaphoretic.   HENT:      Head: Normocephalic and atraumatic.   Eyes:      General: No scleral icterus.        Right eye: No discharge.         Left eye: No discharge.      Conjunctiva/sclera: Conjunctivae normal.   Pulmonary:      Effort: Pulmonary effort is normal. No respiratory distress.   Abdominal:      Tenderness: There is no abdominal tenderness.   Skin:     General: Skin is dry.      Coloration: Skin is not jaundiced or pale.      Findings: No bruising, erythema or rash.   Neurological:      Mental Status: She is alert and oriented to person, place, and time.      Coordination:  Coordination normal.      Gait: Gait normal.   Psychiatric:         Attention and Perception: She is attentive.         Mood and Affect: Mood normal.         Speech: Speech normal.         Behavior: Behavior normal.         Thought Content: Thought content normal.         Judgment: Judgment normal.         Assessment/Plan     Diagnoses and all orders for this visit:    1. Type 2 diabetes mellitus with hyperglycemia, without long-term current use of insulin (CMS/[facility]) (Primary)  -     Hemoglobin A1c; Future  -     Basic metabolic panel; Future  -     Ambulatory Referral for Diabetic Eye Exam-Ophthalmology    2. Blurred vision, bilateral    3. 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)    4. EFREM (generalized anxiety disorder)    5. Other depression    Other orders  -     DULoxetine (CYMBALTA) 60 MG capsule; Take 1 capsule by mouth Daily. Discontinue 30mg Cymbalta  Dispense: 30 capsule; Refill: 11    Prescription refills will be sent for current dosage of gabapentin 800 mg 4 times daily for chronic neuropathy #120 with 5 refills.  Discussed abuse potential and controlled substance status of this medication.  Discussed requirement to follow-up for reevaluation appointment in 6 months to receive refills.  I will contact patient with lab results when I receive them and will plan on having patient follow-up in the office regarding diabetes recheck in 3 months.  She will follow-up sooner if symptoms of blurred vision persist after being seen and evaluated by ophthalmologist.

## 2021-02-22 ENCOUNTER — LAB (OUTPATIENT)
Dept: LAB | Facility: HOSPITAL | Age: 51
End: 2021-02-22

## 2021-02-22 DIAGNOSIS — E11.65 TYPE 2 DIABETES MELLITUS WITH HYPERGLYCEMIA, WITHOUT LONG-TERM CURRENT USE OF INSULIN (HCC): ICD-10-CM

## 2021-02-22 DIAGNOSIS — R53.83 FATIGUE, UNSPECIFIED TYPE: ICD-10-CM

## 2021-02-22 DIAGNOSIS — E11.9 DIABETES MELLITUS TYPE 2 IN NONOBESE (HCC): ICD-10-CM

## 2021-02-22 PROCEDURE — 80053 COMPREHEN METABOLIC PANEL: CPT

## 2021-02-22 PROCEDURE — 85027 COMPLETE CBC AUTOMATED: CPT

## 2021-02-22 PROCEDURE — 83036 HEMOGLOBIN GLYCOSYLATED A1C: CPT

## 2021-02-22 PROCEDURE — 36415 COLL VENOUS BLD VENIPUNCTURE: CPT

## 2021-02-23 ENCOUNTER — TELEPHONE (OUTPATIENT)
Dept: FAMILY MEDICINE CLINIC | Facility: CLINIC | Age: 51
End: 2021-02-23

## 2021-02-23 ENCOUNTER — HOSPITAL ENCOUNTER (EMERGENCY)
Facility: HOSPITAL | Age: 51
Discharge: HOME | End: 2021-02-23
Attending: EMERGENCY MEDICINE | Admitting: EMERGENCY MEDICINE

## 2021-02-23 VITALS
TEMPERATURE: 98.3 F | DIASTOLIC BLOOD PRESSURE: 84 MMHG | WEIGHT: 148 LBS | RESPIRATION RATE: 18 BRPM | HEART RATE: 78 BPM | HEIGHT: 62 IN | BODY MASS INDEX: 27.23 KG/M2 | SYSTOLIC BLOOD PRESSURE: 122 MMHG | OXYGEN SATURATION: 100 %

## 2021-02-23 DIAGNOSIS — E11.65 TYPE 2 DIABETES MELLITUS WITH HYPERGLYCEMIA, WITHOUT LONG-TERM CURRENT USE OF INSULIN (HCC): Primary | ICD-10-CM

## 2021-02-23 DIAGNOSIS — F11.93 OPIATE WITHDRAWAL (HCC): ICD-10-CM

## 2021-02-23 LAB
ALBUMIN SERPL-MCNC: 3.8 G/DL (ref 3.5–5.2)
ALBUMIN SERPL-MCNC: 3.8 G/DL (ref 3.5–5.2)
ALBUMIN/GLOB SERPL: 0.8 G/DL
ALBUMIN/GLOB SERPL: 0.9 G/DL
ALP SERPL-CCNC: 151 U/L (ref 39–117)
ALP SERPL-CCNC: 168 U/L (ref 39–117)
ALT SERPL W P-5'-P-CCNC: 42 U/L (ref 1–33)
ALT SERPL W P-5'-P-CCNC: 55 U/L (ref 1–33)
ANION GAP SERPL CALCULATED.3IONS-SCNC: 14.9 MMOL/L (ref 5–15)
ANION GAP SERPL CALCULATED.3IONS-SCNC: 8 MMOL/L (ref 5–15)
AST SERPL-CCNC: 41 U/L (ref 1–32)
AST SERPL-CCNC: 52 U/L (ref 1–32)
B-OH-BUTYR SERPL-SCNC: 0.17 MMOL/L (ref 0.02–0.27)
BASOPHILS # BLD AUTO: 0.05 10*3/MM3 (ref 0–0.2)
BASOPHILS NFR BLD AUTO: 0.6 % (ref 0–1.5)
BILIRUB SERPL-MCNC: 1.1 MG/DL (ref 0–1.2)
BILIRUB SERPL-MCNC: 1.4 MG/DL (ref 0–1.2)
BILIRUB UR QL STRIP: NEGATIVE
BUN SERPL-MCNC: 4 MG/DL (ref 6–20)
BUN SERPL-MCNC: 6 MG/DL (ref 6–20)
BUN/CREAT SERPL: 5.4 (ref 7–25)
BUN/CREAT SERPL: 6.8 (ref 7–25)
CALCIUM SPEC-SCNC: 9.1 MG/DL (ref 8.6–10.5)
CALCIUM SPEC-SCNC: 9.4 MG/DL (ref 8.6–10.5)
CHLORIDE SERPL-SCNC: 89 MMOL/L (ref 98–107)
CHLORIDE SERPL-SCNC: 92 MMOL/L (ref 98–107)
CLARITY UR: CLEAR
CO2 SERPL-SCNC: 23.1 MMOL/L (ref 22–29)
CO2 SERPL-SCNC: 26 MMOL/L (ref 22–29)
COLOR UR: YELLOW
CREAT SERPL-MCNC: 0.74 MG/DL (ref 0.57–1)
CREAT SERPL-MCNC: 0.88 MG/DL (ref 0.57–1)
DEPRECATED RDW RBC AUTO: 40.5 FL (ref 37–54)
DEPRECATED RDW RBC AUTO: 43.1 FL (ref 37–54)
EOSINOPHIL # BLD AUTO: 0.05 10*3/MM3 (ref 0–0.4)
EOSINOPHIL NFR BLD AUTO: 0.6 % (ref 0.3–6.2)
ERYTHROCYTE [DISTWIDTH] IN BLOOD BY AUTOMATED COUNT: 11.9 % (ref 12.3–15.4)
ERYTHROCYTE [DISTWIDTH] IN BLOOD BY AUTOMATED COUNT: 12.3 % (ref 12.3–15.4)
GFR SERPL CREATININE-BSD FRML MDRD: 68 ML/MIN/1.73
GFR SERPL CREATININE-BSD FRML MDRD: 83 ML/MIN/1.73
GLOBULIN UR ELPH-MCNC: 4.1 GM/DL
GLOBULIN UR ELPH-MCNC: 4.7 GM/DL
GLUCOSE BLDC GLUCOMTR-MCNC: 198 MG/DL (ref 70–130)
GLUCOSE BLDC GLUCOMTR-MCNC: 514 MG/DL (ref 70–130)
GLUCOSE BLDC GLUCOMTR-MCNC: >599 MG/DL (ref 70–130)
GLUCOSE SERPL-MCNC: 597 MG/DL (ref 65–99)
GLUCOSE SERPL-MCNC: 732 MG/DL (ref 65–99)
GLUCOSE UR STRIP-MCNC: ABNORMAL MG/DL
HBA1C MFR BLD: 12.5 % (ref 4.8–5.6)
HCT VFR BLD AUTO: 41.5 % (ref 34–46.6)
HCT VFR BLD AUTO: 44.6 % (ref 34–46.6)
HGB BLD-MCNC: 14.4 G/DL (ref 12–15.9)
HGB BLD-MCNC: 15.2 G/DL (ref 12–15.9)
HGB UR QL STRIP.AUTO: NEGATIVE
HOLD SPECIMEN: NORMAL
IMM GRANULOCYTES # BLD AUTO: 0.02 10*3/MM3 (ref 0–0.05)
IMM GRANULOCYTES NFR BLD AUTO: 0.3 % (ref 0–0.5)
KETONES UR QL STRIP: NEGATIVE
LEUKOCYTE ESTERASE UR QL STRIP.AUTO: NEGATIVE
LYMPHOCYTES # BLD AUTO: 2.54 10*3/MM3 (ref 0.7–3.1)
LYMPHOCYTES NFR BLD AUTO: 32.8 % (ref 19.6–45.3)
MCH RBC QN AUTO: 32.3 PG (ref 26.6–33)
MCH RBC QN AUTO: 32.5 PG (ref 26.6–33)
MCHC RBC AUTO-ENTMCNC: 34.1 G/DL (ref 31.5–35.7)
MCHC RBC AUTO-ENTMCNC: 34.7 G/DL (ref 31.5–35.7)
MCV RBC AUTO: 93 FL (ref 79–97)
MCV RBC AUTO: 95.5 FL (ref 79–97)
MONOCYTES # BLD AUTO: 0.34 10*3/MM3 (ref 0.1–0.9)
MONOCYTES NFR BLD AUTO: 4.4 % (ref 5–12)
NEUTROPHILS NFR BLD AUTO: 4.74 10*3/MM3 (ref 1.7–7)
NEUTROPHILS NFR BLD AUTO: 61.3 % (ref 42.7–76)
NITRITE UR QL STRIP: NEGATIVE
NRBC BLD AUTO-RTO: 0 /100 WBC (ref 0–0.2)
PH UR STRIP.AUTO: 7 [PH] (ref 5–8)
PLATELET # BLD AUTO: 158 10*3/MM3 (ref 140–450)
PLATELET # BLD AUTO: 159 10*3/MM3 (ref 140–450)
PMV BLD AUTO: 11.4 FL (ref 6–12)
PMV BLD AUTO: 12 FL (ref 6–12)
POTASSIUM SERPL-SCNC: 3.4 MMOL/L (ref 3.5–5.2)
POTASSIUM SERPL-SCNC: 4 MMOL/L (ref 3.5–5.2)
PROT SERPL-MCNC: 7.9 G/DL (ref 6–8.5)
PROT SERPL-MCNC: 8.5 G/DL (ref 6–8.5)
PROT UR QL STRIP: NEGATIVE
RBC # BLD AUTO: 4.46 10*6/MM3 (ref 3.77–5.28)
RBC # BLD AUTO: 4.67 10*6/MM3 (ref 3.77–5.28)
SODIUM SERPL-SCNC: 126 MMOL/L (ref 136–145)
SODIUM SERPL-SCNC: 127 MMOL/L (ref 136–145)
SP GR UR STRIP: 1.04 (ref 1–1.03)
UROBILINOGEN UR QL STRIP: ABNORMAL
WBC # BLD AUTO: 7.74 10*3/MM3 (ref 3.4–10.8)
WBC # BLD AUTO: 8.97 10*3/MM3 (ref 3.4–10.8)
WHOLE BLOOD HOLD SPECIMEN: NORMAL
WHOLE BLOOD HOLD SPECIMEN: NORMAL

## 2021-02-23 PROCEDURE — 85025 COMPLETE CBC W/AUTO DIFF WBC: CPT

## 2021-02-23 PROCEDURE — 82962 GLUCOSE BLOOD TEST: CPT

## 2021-02-23 PROCEDURE — 81003 URINALYSIS AUTO W/O SCOPE: CPT | Performed by: EMERGENCY MEDICINE

## 2021-02-23 PROCEDURE — 25010000002 LORAZEPAM PER 2 MG: Performed by: EMERGENCY MEDICINE

## 2021-02-23 PROCEDURE — 63710000001 INSULIN REGULAR HUMAN PER 5 UNITS: Performed by: NURSE PRACTITIONER

## 2021-02-23 PROCEDURE — 80053 COMPREHEN METABOLIC PANEL: CPT

## 2021-02-23 PROCEDURE — 99284 EMERGENCY DEPT VISIT MOD MDM: CPT

## 2021-02-23 PROCEDURE — 96374 THER/PROPH/DIAG INJ IV PUSH: CPT

## 2021-02-23 PROCEDURE — 82010 KETONE BODYS QUAN: CPT

## 2021-02-23 RX ORDER — SODIUM CHLORIDE 0.9 % (FLUSH) 0.9 %
10 SYRINGE (ML) INJECTION AS NEEDED
Status: DISCONTINUED | OUTPATIENT
Start: 2021-02-23 | End: 2021-02-23 | Stop reason: HOSPADM

## 2021-02-23 RX ORDER — LORAZEPAM 2 MG/ML
1 INJECTION INTRAMUSCULAR ONCE
Status: COMPLETED | OUTPATIENT
Start: 2021-02-23 | End: 2021-02-23

## 2021-02-23 RX ORDER — ONDANSETRON 4 MG/1
4 TABLET, ORALLY DISINTEGRATING ORAL EVERY 8 HOURS PRN
Qty: 15 TABLET | Refills: 0 | Status: SHIPPED | OUTPATIENT
Start: 2021-02-23 | End: 2021-08-12

## 2021-02-23 RX ADMIN — INSULIN HUMAN 8 UNITS: 100 INJECTION, SOLUTION PARENTERAL at 18:29

## 2021-02-23 RX ADMIN — SODIUM CHLORIDE 1000 ML: 9 INJECTION, SOLUTION INTRAVENOUS at 15:58

## 2021-02-23 RX ADMIN — INSULIN HUMAN 10 UNITS: 100 INJECTION, SOLUTION PARENTERAL at 15:40

## 2021-02-23 RX ADMIN — LORAZEPAM 1 MG: 2 INJECTION INTRAMUSCULAR; INTRAVENOUS at 16:01

## 2021-02-23 RX ADMIN — SODIUM CHLORIDE 1000 ML: 9 INJECTION, SOLUTION INTRAVENOUS at 17:17

## 2021-02-23 NOTE — ED PROVIDER NOTES
Subjective   Luz Elena Ballard is a 50 y.o. female who presents to the ED with c/o hyperglycemia. The patient was at her primary care provider's office yesterday having labs drawn. Today she was contacted and advised to go to the ED for evaluation after her labs revealed a blood glucose level of over 500. The patient was recently diagnosed with diabetes mellitus 1 month ago and placed on Metformin, with her last dosage occurring this morning. She complains of dizziness, fatigue, abdominal pain, shortness of breath, and diarrhea but denies chest pain. The patient reports she is an IV heroin user and her last usage was 2 days ago. There are no other acute complaints at this time.      History provided by:  Patient  Hyperglycemia  Blood sugar level PTA:  732  Severity:  Severe  Onset quality:  Sudden  Duration:  1 day  Timing:  Constant  Progression:  Unchanged  Chronicity:  New  Diabetes status:  Controlled with oral medications  Current diabetic therapy:  Metformin  Time since last antidiabetic medication:  4 hours  Context: new diabetes diagnosis    Relieved by:  Nothing  Ineffective treatments:  Oral agents  Associated symptoms: abdominal pain, dizziness, fatigue and shortness of breath    Associated symptoms: no chest pain    Risk factors: family hx of diabetes    Risk factors: no hx of DKA and no pregnancy        Review of Systems   Constitutional: Positive for fatigue.   Respiratory: Positive for shortness of breath.    Cardiovascular: Negative for chest pain.   Gastrointestinal: Positive for abdominal pain.   Endocrine:        She is hyperglycemic.   Neurological: Positive for dizziness.   Psychiatric/Behavioral: The patient is nervous/anxious.    All other systems reviewed and are negative.      Past Medical History:   Diagnosis Date   • Arthritis     Hx of.   • Basal cell carcinoma     Hx of.   • Cellulitis     Hx of. Resolved 2/15/2016.   • Cellulitis of finger     History of Cellulitis Fingers Right Middle  Finger. Resolved 2/15/2016   • Depression     Hx of.   • Fatty liver    • History of Blocked tear duct     Resolved 2/15/2016   • History of migraine headaches    • Hypercholesterolemia     Hx of.   • Infectious viral hepatitis     HEP C   • IV drug abuse (CMS/HCC)     Hx of. Resolved 2/12/2016.   • Obstructive sleep apnea     Hx of.   • Sciatica     Hx of. Resolved. 2/15/2016. Resolved. 7/21/2015.   • Sciatica    • Skin cancer     Hx of.   • Upper respiratory infection     Hx of. 2/15/2016       Allergies   Allergen Reactions   • Keflex [Cephalexin] GI Intolerance   • Sulfa Antibiotics      Sulfa drugs   • Vancomycin Rash       Past Surgical History:   Procedure Laterality Date   • BREAST SURGERY  2000    Reconstruction   • CHOLECYSTECTOMY  2000   • HYSTERECTOMY  08/06/2003   • OTHER SURGICAL HISTORY      Tubal Ligation   • REDUCTION MAMMAPLASTY Bilateral 2002   • TUBAL ABDOMINAL LIGATION  1992       Family History   Problem Relation Age of Onset   • Hypertension Mother    • Arthritis Mother    • Depression Mother    • Anxiety disorder Father    • Suicidality Father    • Depression Sister    • Diabetes Maternal Grandmother    • Breast cancer Neg Hx    • Ovarian cancer Neg Hx        Social History     Socioeconomic History   • Marital status:      Spouse name: Not on file   • Number of children: Not on file   • Years of education: Not on file   • Highest education level: Not on file   Tobacco Use   • Smoking status: Light Tobacco Smoker   • Smokeless tobacco: Never Used   • Tobacco comment: Smokes less than half pack per week   Substance and Sexual Activity   • Alcohol use: No   • Drug use: Yes     Types: IV, Heroin   • Sexual activity: Yes     Partners: Male         Objective   Physical Exam  Vitals signs and nursing note reviewed.   Constitutional:       General: She is not in acute distress.     Appearance: She is well-developed.   HENT:      Head: Normocephalic and atraumatic.   Eyes:      General: No  scleral icterus.     Conjunctiva/sclera: Conjunctivae normal.      Comments: Pupils are dilated.   Neck:      Musculoskeletal: Normal range of motion and neck supple.   Cardiovascular:      Rate and Rhythm: Regular rhythm. Tachycardia present.      Heart sounds: Normal heart sounds. No murmur.   Pulmonary:      Effort: Pulmonary effort is normal. No respiratory distress.      Breath sounds: Normal breath sounds.   Abdominal:      General: Bowel sounds are normal.      Palpations: Abdomen is soft.      Tenderness: There is no abdominal tenderness.   Musculoskeletal: Normal range of motion.   Skin:     General: Skin is warm and dry.   Neurological:      Mental Status: She is alert and oriented to person, place, and time.      Motor: Tremor present.      Comments: The patient is shaking.   Psychiatric:         Mood and Affect: Mood is anxious.         Behavior: Behavior normal.         Procedures         ED Course     Recent Results (from the past 24 hour(s))   POC Glucose Once    Collection Time: 02/23/21  1:31 PM    Specimen: Blood   Result Value Ref Range    Glucose >599 (C) 70 - 130 mg/dL   Comprehensive Metabolic Panel    Collection Time: 02/23/21  1:33 PM    Specimen: Blood   Result Value Ref Range    Glucose 732 (C) 65 - 99 mg/dL    BUN 6 6 - 20 mg/dL    Creatinine 0.88 0.57 - 1.00 mg/dL    Sodium 126 (L) 136 - 145 mmol/L    Potassium 3.4 (L) 3.5 - 5.2 mmol/L    Chloride 92 (L) 98 - 107 mmol/L    CO2 26.0 22.0 - 29.0 mmol/L    Calcium 9.1 8.6 - 10.5 mg/dL    Total Protein 7.9 6.0 - 8.5 g/dL    Albumin 3.80 3.50 - 5.20 g/dL    ALT (SGPT) 42 (H) 1 - 33 U/L    AST (SGOT) 41 (H) 1 - 32 U/L    Alkaline Phosphatase 168 (H) 39 - 117 U/L    Total Bilirubin 1.1 0.0 - 1.2 mg/dL    eGFR Non African Amer 68 >60 mL/min/1.73    Globulin 4.1 gm/dL    A/G Ratio 0.9 g/dL    BUN/Creatinine Ratio 6.8 (L) 7.0 - 25.0    Anion Gap 8.0 5.0 - 15.0 mmol/L   Light Blue Top    Collection Time: 02/23/21  1:33 PM   Result Value Ref Range     Extra Tube hold for add-on    Green Top (Gel)    Collection Time: 02/23/21  1:33 PM   Result Value Ref Range    Extra Tube Hold for add-ons.    Lavender Top    Collection Time: 02/23/21  1:33 PM   Result Value Ref Range    Extra Tube hold for add-on    Gold Top - SST    Collection Time: 02/23/21  1:33 PM   Result Value Ref Range    Extra Tube Hold for add-ons.    Gray Top - Ice    Collection Time: 02/23/21  1:33 PM   Result Value Ref Range    Extra Tube Hold for add-ons.    CBC Auto Differential    Collection Time: 02/23/21  1:33 PM    Specimen: Blood   Result Value Ref Range    WBC 7.74 3.40 - 10.80 10*3/mm3    RBC 4.46 3.77 - 5.28 10*6/mm3    Hemoglobin 14.4 12.0 - 15.9 g/dL    Hematocrit 41.5 34.0 - 46.6 %    MCV 93.0 79.0 - 97.0 fL    MCH 32.3 26.6 - 33.0 pg    MCHC 34.7 31.5 - 35.7 g/dL    RDW 11.9 (L) 12.3 - 15.4 %    RDW-SD 40.5 37.0 - 54.0 fl    MPV 11.4 6.0 - 12.0 fL    Platelets 158 140 - 450 10*3/mm3    Neutrophil % 61.3 42.7 - 76.0 %    Lymphocyte % 32.8 19.6 - 45.3 %    Monocyte % 4.4 (L) 5.0 - 12.0 %    Eosinophil % 0.6 0.3 - 6.2 %    Basophil % 0.6 0.0 - 1.5 %    Immature Grans % 0.3 0.0 - 0.5 %    Neutrophils, Absolute 4.74 1.70 - 7.00 10*3/mm3    Lymphocytes, Absolute 2.54 0.70 - 3.10 10*3/mm3    Monocytes, Absolute 0.34 0.10 - 0.90 10*3/mm3    Eosinophils, Absolute 0.05 0.00 - 0.40 10*3/mm3    Basophils, Absolute 0.05 0.00 - 0.20 10*3/mm3    Immature Grans, Absolute 0.02 0.00 - 0.05 10*3/mm3    nRBC 0.0 0.0 - 0.2 /100 WBC   Beta Hydroxybutyrate Quantitative    Collection Time: 02/23/21  1:33 PM    Specimen: Blood   Result Value Ref Range    Beta-Hydroxybutyrate Quant 0.167 0.020 - 0.270 mmol/L   Urinalysis With Microscopic If Indicated (No Culture) - Urine, Clean Catch    Collection Time: 02/23/21  3:57 PM    Specimen: Urine, Clean Catch   Result Value Ref Range    Color, UA Yellow Yellow, Straw    Appearance, UA Clear Clear    pH, UA 7.0 5.0 - 8.0    Specific Gravity, UA 1.040 (H) 1.001 - 1.030     Glucose, UA >=1000 mg/dL (3+) (A) Negative    Ketones, UA Negative Negative    Bilirubin, UA Negative Negative    Blood, UA Negative Negative    Protein, UA Negative Negative    Leuk Esterase, UA Negative Negative    Nitrite, UA Negative Negative    Urobilinogen, UA 1.0 E.U./dL 0.2 - 1.0 E.U./dL   POC Glucose Once    Collection Time: 02/23/21  5:18 PM    Specimen: Blood   Result Value Ref Range    Glucose 514 (C) 70 - 130 mg/dL   POC Glucose Once    Collection Time: 02/23/21  7:10 PM    Specimen: Blood   Result Value Ref Range    Glucose 198 (H) 70 - 130 mg/dL     Note: In addition to lab results from this visit, the labs listed above may include labs taken at another facility or during a different encounter within the last 24 hours. Please correlate lab times with ED admission and discharge times for further clarification of the services performed during this visit.    No orders to display     Vitals:    02/23/21 1630 02/23/21 1730 02/23/21 1800 02/23/21 1830   BP: 130/49 145/75 126/69 122/84   BP Location:       Patient Position:       Pulse: 78      Resp:       Temp:       TempSrc:       SpO2: 98%  99% 100%   Weight:       Height:         Medications   sodium chloride 0.9 % flush 10 mL (has no administration in time range)   sodium chloride 0.9 % flush 10 mL (has no administration in time range)   sodium chloride 0.9 % bolus 1,000 mL (0 mL Intravenous Stopped 2/23/21 1717)   insulin regular (humuLIN R,novoLIN R) injection 10 Units (10 Units Subcutaneous Given 2/23/21 1540)   LORazepam (ATIVAN) injection 1 mg (1 mg Intravenous Given 2/23/21 1601)   sodium chloride 0.9 % bolus 1,000 mL (0 mL Intravenous Stopped 2/23/21 1825)   insulin regular (humuLIN R,novoLIN R) injection 8 Units (8 Units Intravenous Given 2/23/21 1829)     ECG/EMG Results (last 24 hours)     ** No results found for the last 24 hours. **        No orders to display       Reexamination: Patient sitting upright in bed no acute distress.  After  receiving fluids, and second dose of insulin POC glucose was 198.  Patient is stable and ready for discharged home.  Discussed the importance of following a diabetic diet with controlled carbs, and taking metformin as prescribed.  Also advised patient to return to the ER with uncontrolled blood sugar, and as needed.  Patient verbalized understanding of all discussed.  Patient was discharged home in the care of her family member.  Patient ambulatory upon discharge and in stable condition                                       MDM    Final diagnoses:   Type 2 diabetes mellitus with hyperglycemia, without long-term current use of insulin (CMS/[facility])   Opiate withdrawal (CMS/[facility])       Documentation assistance provided by dimitri Salas.  Information recorded by the dimitri was done at my direction and has been verified and validated by me.     Edil Salas  02/23/21 2327       Martha Sue APRN  02/23/21 7530

## 2021-02-23 NOTE — TELEPHONE ENCOUNTER
+ Eva, please contact patient notify her blood sugar is extremely high at 597 and also her electrolytes are via balance with low sodium and chloride, liver enzymes up, he needs to go to ER for further evaluation today.  I would recommend going to [facility] [place], operated by [facility] ER because they will be able to see all of her labs.

## 2021-02-25 ENCOUNTER — TELEPHONE (OUTPATIENT)
Dept: FAMILY MEDICINE CLINIC | Facility: CLINIC | Age: 51
End: 2021-02-25

## 2021-02-25 DIAGNOSIS — Z79.4 TYPE 2 DIABETES MELLITUS WITH HYPERGLYCEMIA, WITH LONG-TERM CURRENT USE OF INSULIN (HCC): Primary | ICD-10-CM

## 2021-02-25 DIAGNOSIS — E11.65 TYPE 2 DIABETES MELLITUS WITH HYPERGLYCEMIA, WITH LONG-TERM CURRENT USE OF INSULIN (HCC): Primary | ICD-10-CM

## 2021-02-25 RX ORDER — INSULIN GLARGINE 100 [IU]/ML
20 INJECTION, SOLUTION SUBCUTANEOUS NIGHTLY
Qty: 3 PEN | Refills: 1 | Status: SHIPPED | OUTPATIENT
Start: 2021-02-25 | End: 2021-02-25 | Stop reason: CLARIF

## 2021-02-25 RX ORDER — BLOOD-GLUCOSE METER
1 KIT MISCELLANEOUS 2 TIMES DAILY
Qty: 1 EACH | Refills: 0 | Status: SHIPPED | OUTPATIENT
Start: 2021-02-25 | End: 2023-03-08

## 2021-02-25 NOTE — TELEPHONE ENCOUNTER
Eva, please also tell her that I would like to see if she needs referral for substance abuse disorder counseling/treatment program.

## 2021-02-25 NOTE — TELEPHONE ENCOUNTER
----- Message from Adenike Mccollum sent at 2/25/2021  9:11 AM EST -----  Regarding: MEDICATION CONCERN  Patient called stating that she was in the ER at [facility] on 02/23 (read ER note), for her blood glucose levels being over 700. She is on metformin ER (GLUCOPHAGE-XR) 500 MG 24 hr tablet  for diabetes (one pill daily). The ER did not give her any type of insulin and wants to know if insulin can be sent in and also a glucose meter with strips. [id number] [address] AT [facility] & [address] [facility] [phone] [facility] [phone]..  Thanks,Michelle..

## 2021-02-25 NOTE — TELEPHONE ENCOUNTER
Please instruct her to continue on current dosage of Metformin, sent in an insulin pen needles have her start using this nightly the pharmacy should be able to explain how to use the pen, please put an order in for me to send the correct glucose meter lancets and test strips and let her know that I have placed referral for endocrinology I want them to evaluate her and manage her as well since we are starting her on insulin but between now and when she sees them if her blood sugars are not staying under 200 after 2 weeks on insulin please let me know.

## 2021-02-25 NOTE — TELEPHONE ENCOUNTER
PATIENT STATED SHE IS NEEDING INSULIN PRESCRIPTION.  PATIENT STATED THE PEN NEEDLES WENT THROUGH FINE, BUT STILL NEEDS THE INSULIN.    PHARMACY REQUESTING PRE AUTHORIZATION      CALL BACK:  [phone]      PHARMACY:      [id number] - [address] AT [facility] & [address]  - [phone]  - [phone]   [phone]

## 2021-02-25 NOTE — TELEPHONE ENCOUNTER
Notified patient of referral/medications and new glucometer. Patient has declined any referral to substance abuse counseling. But does want a referral to an eye doctor and endo

## 2021-03-01 ENCOUNTER — TELEPHONE (OUTPATIENT)
Dept: FAMILY MEDICINE CLINIC | Facility: CLINIC | Age: 51
End: 2021-03-01

## 2021-03-01 ENCOUNTER — TELEMEDICINE (OUTPATIENT)
Dept: FAMILY MEDICINE CLINIC | Facility: CLINIC | Age: 51
End: 2021-03-01

## 2021-03-01 DIAGNOSIS — G89.29 CHRONIC BACK PAIN, UNSPECIFIED BACK LOCATION, UNSPECIFIED BACK PAIN LATERALITY: ICD-10-CM

## 2021-03-01 DIAGNOSIS — L03.114 CELLULITIS OF LEFT UPPER EXTREMITY: ICD-10-CM

## 2021-03-01 DIAGNOSIS — F19.10 SUBSTANCE ABUSE (HCC): ICD-10-CM

## 2021-03-01 DIAGNOSIS — M54.9 CHRONIC BACK PAIN, UNSPECIFIED BACK LOCATION, UNSPECIFIED BACK PAIN LATERALITY: ICD-10-CM

## 2021-03-01 DIAGNOSIS — E11.9 DIABETES MELLITUS TYPE 2 IN NONOBESE (HCC): ICD-10-CM

## 2021-03-01 DIAGNOSIS — M79.642 PAIN IN BOTH HANDS: Primary | ICD-10-CM

## 2021-03-01 DIAGNOSIS — M79.641 PAIN IN BOTH HANDS: Primary | ICD-10-CM

## 2021-03-01 PROCEDURE — 99214 OFFICE O/P EST MOD 30 MIN: CPT | Performed by: PHYSICIAN ASSISTANT

## 2021-03-01 RX ORDER — DOXYCYCLINE HYCLATE 100 MG/1
100 CAPSULE ORAL 2 TIMES DAILY
Qty: 20 CAPSULE | Refills: 0 | Status: SHIPPED | OUTPATIENT
Start: 2021-03-01 | End: 2021-03-15 | Stop reason: SDUPTHER

## 2021-03-01 RX ORDER — TIZANIDINE 2 MG/1
2 TABLET ORAL EVERY 8 HOURS PRN
Qty: 90 TABLET | Refills: 5 | Status: SHIPPED | OUTPATIENT
Start: 2021-03-01 | End: 2021-07-19 | Stop reason: SDUPTHER

## 2021-03-01 NOTE — TELEPHONE ENCOUNTER
Pt called and stated her insurance does not cover the glucose monitoring kit and has not been able to check her glucose levels since her last visit 2/25. Please advise

## 2021-03-01 NOTE — PROGRESS NOTES
Subjective   Luz Elena Ballard is a 50 y.o. female  Arthritis and Back Pain      History of Present Illness  Patient is a 50-year-old white female who presents today via video visit after giving her consent for this to be her office visit.  She is a known type II diabetic that is recently been seen in the ER due to uncontrolled blood glucose.  Please see ER notes and our office notes regarding communication and the addition of insulin to her regimen.  She states she has not been able to get a glucometer yet and is working with her pharmacy to try to get this picked up this week.  She states that she is concerned about increasing tightness pain stiffness and at times numbness in both hands.  She has a history of a neuropathy, she states that her chronic back pain has been bothering her more lately as well.  Unfortunately patient has a history of substance abuse and has recently started using IV heroin again.  This was documented in her ER notes.  She has developed an area of redness and swelling on her left forearm and an area near where she has injected IV drugs and has noticed this getting larger over the past few days.  She denies any fever.  Video visit today 25 minutes in length.  The following portions of the patient's history were reviewed and updated as appropriate: allergies, current medications, past social history and problem list    Review of Systems   Constitutional: Positive for activity change, appetite change and fatigue. Negative for chills, diaphoresis, fever and unexpected weight change.   HENT: Negative.    Eyes: Positive for visual disturbance ( Ongoing blurred vision over the past couple of months awaiting appointment with ophthalmology).   Respiratory: Negative.    Cardiovascular: Negative.    Gastrointestinal: Negative.    Musculoskeletal: Positive for arthralgias, back pain, joint swelling and myalgias.   Skin: Positive for color change, rash and wound.   Allergic/Immunologic: Positive for  immunocompromised state.   Neurological: Positive for numbness.   Psychiatric/Behavioral: Positive for decreased concentration and dysphoric mood. Negative for self-injury and suicidal ideas.       Objective     There were no vitals filed for this visit.    Physical Exam  Constitutional:       General: She is not in acute distress.     Appearance: She is well-developed. She is not ill-appearing, toxic-appearing or diaphoretic.      Comments: Patient appears disheveled   HENT:      Head: Normocephalic and atraumatic.   Eyes:      General: No scleral icterus.     Conjunctiva/sclera: Conjunctivae normal.   Pulmonary:      Effort: Pulmonary effort is normal. No respiratory distress.      Breath sounds: Normal breath sounds.   Musculoskeletal: Normal range of motion.         General: No swelling or deformity.   Skin:     General: Skin is dry.      Findings: Bruising, erythema and lesion present.      Comments: Examination of patient's forearms reveals track marks in her right forearm which do not appear erythematous but in the area consistent with an abscess on her left anterior forearm approximately 2 cm in diameter, no streaking noted.   Neurological:      Mental Status: She is alert and oriented to person, place, and time.      Cranial Nerves: No cranial nerve deficit.   Psychiatric:         Attention and Perception: She is attentive.         Speech: Speech normal.         Behavior: Behavior normal.         Thought Content: Thought content normal.         Judgment: Judgment normal.         Assessment/Plan     Diagnoses and all orders for this visit:    1. Pain in both hands (Primary)    2. Chronic back pain, unspecified back location, unspecified back pain laterality    3. Diabetes mellitus type 2 in nonobese (CMS/[facility])    4. Cellulitis of left upper extremity    5. Substance abuse (CMS/[facility])    Other orders  -     doxycycline (VIBRAMYCIN) 100 MG capsule; Take 1 capsule by mouth 2 (Two) Times a Day. For infection   Dispense: 20 capsule; Refill: 0  -     tiZANidine (ZANAFLEX) 2 MG tablet; Take 1 tablet by mouth Every 8 (Eight) Hours As Needed for Muscle Spasms.  Dispense: 90 tablet; Refill: 5  -     diclofenac (VOLTAREN) 50 MG EC tablet; Take 1 tablet by mouth 3 (Three) Times a Day. As needed for back pain and or hand pain  Dispense: 90 tablet; Refill: 5    I had a very sajan conversation with patient explaining to her that if she continues to use IV drugs/IV heroin that all of her medical conditions will progressively worsen.  I discussed with her the direct correlation between this and the infection that is evident on her arm and that I believe it is a direct cause of the pain she is feeling in her hands.  I discussed with her the combination of this and her diabetes is very destructive of her immune system as well as cardiovascular system and neurological system.  I strongly suggested that she enter an inpatient substance abuse treatment program.  I have offered again to help place this referral, patient declines my assistance with this and seems hesitant to commit to entering a substance abuse program at this time.  I have instructed her to go to the emergency room for further evaluation and treatment if the abscess on her arm worsens and to follow-up with me if it is unresolved after antibiotic completed.  Again reiterated if she continues to use IV drugs infection such as this will continue to occur.  Discussed with her that this could be life-threatening if she continues to use IV drugs.

## 2021-03-01 NOTE — TELEPHONE ENCOUNTER
Per pharmacy they have tried multiple glucometer and nothing seems to be covered. They have notified patient numerous times to contact her insurance company to see which one they will pay for. I spoke to patient my self and advised her to call as well.

## 2021-03-03 RX ORDER — METFORMIN HYDROCHLORIDE 500 MG/1
500 TABLET, EXTENDED RELEASE ORAL
Qty: 30 TABLET | Refills: 2 | Status: SHIPPED | OUTPATIENT
Start: 2021-03-03 | End: 2021-07-01 | Stop reason: SDUPTHER

## 2021-03-15 ENCOUNTER — TELEPHONE (OUTPATIENT)
Dept: FAMILY MEDICINE CLINIC | Facility: CLINIC | Age: 51
End: 2021-03-15

## 2021-03-15 RX ORDER — DOXYCYCLINE HYCLATE 100 MG/1
100 CAPSULE ORAL 2 TIMES DAILY
Qty: 20 CAPSULE | Refills: 0 | Status: SHIPPED | OUTPATIENT
Start: 2021-03-15 | End: 2021-07-19

## 2021-03-16 ENCOUNTER — TELEPHONE (OUTPATIENT)
Dept: FAMILY MEDICINE CLINIC | Facility: CLINIC | Age: 51
End: 2021-03-16

## 2021-03-16 NOTE — TELEPHONE ENCOUNTER
Advise patient make an in patient appointment in the office with us, bring her daily log of glucose readings.  If her blood sugar is skyhigh as she is reported it will certainly cause nausea.  I think also needs to be mentioned the patient has admitted to being an active heroin user and this most certainly will play a role in how she is feeling as well is the fact that it appears she still has infection in her skin from her IV drug use.  If blood sugar is running high we need to increase the insulin it is not the insulin that is making her nauseated.  I will emphasize again that I feel she needs to go to an inpatient rehab program and get completely clean of heroin or I do not believe she will be able to feel well or be healthy.

## 2021-03-16 NOTE — TELEPHONE ENCOUNTER
----- Message from Luz Elena Ballard sent at 3/16/2021  2:49 AM EDT -----  Regarding: Prescription Question  Contact: [phone]  I know in the previous message about my insulin I was told to up it to 30 units nightly. But I had mentioned it always made me nausea but now it is burning and making me vomit and whelpin on site of my shot. Should I try something else or a different kind?

## 2021-07-01 DIAGNOSIS — I10 ESSENTIAL HYPERTENSION: Chronic | ICD-10-CM

## 2021-07-01 RX ORDER — METFORMIN HYDROCHLORIDE 500 MG/1
500 TABLET, EXTENDED RELEASE ORAL
Qty: 30 TABLET | Refills: 0 | Status: SHIPPED | OUTPATIENT
Start: 2021-07-01 | End: 2021-07-19 | Stop reason: SDUPTHER

## 2021-07-01 RX ORDER — LISINOPRIL 20 MG/1
TABLET ORAL
Qty: 30 TABLET | Refills: 0 | Status: SHIPPED | OUTPATIENT
Start: 2021-07-01 | End: 2021-07-19 | Stop reason: SDUPTHER

## 2021-07-01 NOTE — TELEPHONE ENCOUNTER
Last seen on video visit 03/01/2021    Cancelled appt: 03/31/2021  No show: 04/05/2021  No show: 06/29/2021

## 2021-07-02 ENCOUNTER — TELEPHONE (OUTPATIENT)
Dept: FAMILY MEDICINE CLINIC | Facility: CLINIC | Age: 51
End: 2021-07-02

## 2021-07-19 ENCOUNTER — OFFICE VISIT (OUTPATIENT)
Dept: FAMILY MEDICINE CLINIC | Facility: CLINIC | Age: 51
End: 2021-07-19

## 2021-07-19 VITALS
WEIGHT: 135 LBS | HEIGHT: 62 IN | DIASTOLIC BLOOD PRESSURE: 76 MMHG | HEART RATE: 67 BPM | BODY MASS INDEX: 24.84 KG/M2 | TEMPERATURE: 96.9 F | OXYGEN SATURATION: 99 % | SYSTOLIC BLOOD PRESSURE: 140 MMHG

## 2021-07-19 DIAGNOSIS — F32.89 OTHER DEPRESSION: ICD-10-CM

## 2021-07-19 DIAGNOSIS — E11.69 TYPE 2 DIABETES MELLITUS WITH OTHER SPECIFIED COMPLICATION, WITH LONG-TERM CURRENT USE OF INSULIN (HCC): Primary | ICD-10-CM

## 2021-07-19 DIAGNOSIS — I10 ESSENTIAL HYPERTENSION: Chronic | ICD-10-CM

## 2021-07-19 DIAGNOSIS — M54.50 CHRONIC MIDLINE LOW BACK PAIN WITHOUT SCIATICA: ICD-10-CM

## 2021-07-19 DIAGNOSIS — G89.29 CHRONIC MIDLINE LOW BACK PAIN WITHOUT SCIATICA: ICD-10-CM

## 2021-07-19 DIAGNOSIS — M47.812 CHRONIC NECK PAIN WITH OSTEOARTHRITIS DETERMINED BY X-RAY: ICD-10-CM

## 2021-07-19 DIAGNOSIS — F41.9 ANXIETY: ICD-10-CM

## 2021-07-19 DIAGNOSIS — G89.29 CHRONIC NECK PAIN WITH OSTEOARTHRITIS DETERMINED BY X-RAY: ICD-10-CM

## 2021-07-19 DIAGNOSIS — Z79.4 TYPE 2 DIABETES MELLITUS WITH OTHER SPECIFIED COMPLICATION, WITH LONG-TERM CURRENT USE OF INSULIN (HCC): Primary | ICD-10-CM

## 2021-07-19 LAB
GLUCOSE BLDC GLUCOMTR-MCNC: 155 MG/DL (ref 70–130)
HBA1C MFR BLD: 5.9 %

## 2021-07-19 PROCEDURE — 99214 OFFICE O/P EST MOD 30 MIN: CPT | Performed by: PHYSICIAN ASSISTANT

## 2021-07-19 PROCEDURE — 83036 HEMOGLOBIN GLYCOSYLATED A1C: CPT | Performed by: PHYSICIAN ASSISTANT

## 2021-07-19 RX ORDER — METFORMIN HYDROCHLORIDE 500 MG/1
500 TABLET, EXTENDED RELEASE ORAL
Qty: 30 TABLET | Refills: 5 | Status: SHIPPED | OUTPATIENT
Start: 2021-07-19 | End: 2022-06-27 | Stop reason: SDUPTHER

## 2021-07-19 RX ORDER — DICLOFENAC SODIUM 75 MG/1
75 TABLET, DELAYED RELEASE ORAL 2 TIMES DAILY
Qty: 60 TABLET | Refills: 5 | Status: SHIPPED | OUTPATIENT
Start: 2021-07-19 | End: 2022-02-21

## 2021-07-19 RX ORDER — TIZANIDINE 2 MG/1
4 TABLET ORAL EVERY 8 HOURS PRN
Qty: 90 TABLET | Refills: 5 | Status: SHIPPED | OUTPATIENT
Start: 2021-07-19 | End: 2021-08-02 | Stop reason: DRUGHIGH

## 2021-07-19 RX ORDER — LISINOPRIL 20 MG/1
20 TABLET ORAL DAILY
Qty: 30 TABLET | Refills: 5 | Status: SHIPPED | OUTPATIENT
Start: 2021-07-19 | End: 2022-02-21

## 2021-07-19 RX ORDER — DULOXETIN HYDROCHLORIDE 30 MG/1
CAPSULE, DELAYED RELEASE ORAL
Qty: 90 CAPSULE | Refills: 11 | Status: SHIPPED | OUTPATIENT
Start: 2021-07-19 | End: 2022-06-27 | Stop reason: SDUPTHER

## 2021-07-19 NOTE — PROGRESS NOTES
Subjective   Luz Elena Ballard is a 51 y.o. female  Back Pain (follow up on back pain, req new MRI for disability ) and Med Refill (med refill on lisinopril, lantus, metformin and pen needles )      History of Present Illness  Patient is a 51-year-old female comes today for follow-up on diabetes mellitus on insulin, chronic neck pain, chronic back pain, hypertension, anxiety and depression.  She is been checking her blood glucose levels regularly and getting good readings, due for refills on medication.  She is having problems with chronic low back pain since 2016.  Try physical therapy in the past but not recently.  Pain focuses in the mid low back does not radiate to her hips or legs.  She also has intermittent posterior neck pain which seems to decrease the range of motion of her neck at times especially when turning to the left.  Symptoms of neck and back seem to have worsened since last year.  Tizanidine helps her neck and back pain at night moderately but she still has breakthrough discomfort.  Denies any adverse effect from tizanidine.  Having more symptoms of anxiety lately would like to try increasing Cymbalta currently on 60 mg daily doing well on his medicine no adverse effects noted.  The following portions of the patient's history were reviewed and updated as appropriate: allergies, current medications, past social history and problem list    Review of Systems   Constitutional: Negative.  Negative for appetite change, diaphoresis, fatigue and unexpected weight change.   HENT: Negative for sore throat, trouble swallowing and voice change.    Eyes: Negative for visual disturbance.   Respiratory: Negative.  Negative for chest tightness and shortness of breath.    Cardiovascular: Negative for chest pain, palpitations and leg swelling.   Gastrointestinal: Negative.  Negative for diarrhea, nausea and vomiting.   Endocrine: Negative for polydipsia, polyphagia and polyuria.   Genitourinary: Negative.     Musculoskeletal: Positive for back pain, neck pain and neck stiffness. Negative for arthralgias, gait problem and myalgias.   Skin: Negative for color change, rash and wound.   Allergic/Immunologic: Positive for immunocompromised state.   Neurological: Negative for dizziness, tremors, weakness, light-headedness and numbness.   Hematological: Negative for adenopathy.   Psychiatric/Behavioral: The patient is nervous/anxious.        Objective     Vitals:    07/19/21 1446   BP: 140/76   Pulse: 67   Temp: 96.9 °F (36.1 °C)   SpO2: 99%   A1c today was 5.9, glucose 155, discussed this with patient.    Physical Exam  Vitals and nursing note reviewed.   Constitutional:       General: She is not in acute distress.     Appearance: Normal appearance. She is well-developed. She is not ill-appearing, toxic-appearing or diaphoretic.   HENT:      Head: Normocephalic and atraumatic.   Eyes:      Conjunctiva/sclera: Conjunctivae normal.   Neck:      Vascular: No JVD.   Cardiovascular:      Rate and Rhythm: Normal rate and regular rhythm.      Pulses:           Dorsalis pedis pulses are 2+ on the right side and 2+ on the left side.        Posterior tibial pulses are 2+ on the right side and 2+ on the left side.      Heart sounds: Normal heart sounds. No murmur heard.     Pulmonary:      Effort: Pulmonary effort is normal. No respiratory distress.      Breath sounds: Normal breath sounds.   Abdominal:      General: Bowel sounds are normal. There is no distension.      Palpations: Abdomen is soft.      Tenderness: There is no abdominal tenderness.   Musculoskeletal:         General: Tenderness ( Tenderness mid lumbar spinal region and mid cervical spinal region) present.      Cervical back: Tenderness and bony tenderness present.      Lumbar back: Tenderness and bony tenderness present. No swelling, deformity or spasms. Normal range of motion.   Skin:     General: Skin is warm and dry.      Coloration: Skin is not pale.      Findings:  No bruising, erythema or rash.   Neurological:      Mental Status: She is alert and oriented to person, place, and time.      Coordination: Coordination normal.      Gait: Gait normal.      Deep Tendon Reflexes: Reflexes are normal and symmetric.   Psychiatric:         Mood and Affect: Mood normal.         Behavior: Behavior normal.         Assessment/Plan     Diagnoses and all orders for this visit:    1. Type 2 diabetes mellitus with other specified complication, with long-term current use of insulin (CMS/[facility]) (Primary)  -     POC Glycosylated Hemoglobin (Hb A1C)  -     POCT Glucose    2. Chronic neck pain with osteoarthritis determined by x-ray  -     MRI Cervical Spine Without Contrast; Future    3. Chronic midline low back pain without sciatica  -     MRI Lumbar Spine Without Contrast; Future    4. Essential hypertension  -     lisinopril (PRINIVIL,ZESTRIL) 20 MG tablet; Take 1 tablet by mouth Daily.  Dispense: 30 tablet; Refill: 5    5. Anxiety    6. Other depression    Other orders  -     tiZANidine (ZANAFLEX) 2 MG tablet; Take 2 tablets by mouth Every 8 (Eight) Hours As Needed for Muscle Spasms.  Dispense: 90 tablet; Refill: 5  -     diclofenac (VOLTAREN) 75 MG EC tablet; Take 1 tablet by mouth 2 (Two) Times a Day.  Dispense: 60 tablet; Refill: 5  -     Insulin Glargine (LANTUS SOLOSTAR) 100 UNIT/ML injection pen; Inject 20 Units under the skin into the appropriate area as directed Every Night. For diabetes  Dispense: 3 pen; Refill: 6  -     DULoxetine (CYMBALTA) 30 MG capsule; New dose, take 90mg daily for anxiety/depression and chronic pain  Dispense: 90 capsule; Refill: 11  -     Insulin Pen Needle 31G X 8 MM misc; 20 Units Every Night.  Dispense: 100 each; Refill: 12  -     metFORMIN ER (GLUCOPHAGE-XR) 500 MG 24 hr tablet; Take 1 tablet by mouth Daily Before Supper.  Dispense: 30 tablet; Refill: 5    I will send a letter to patient with my detailed interpretation of patient's MRI after I myself have  received and reviewed the above ordered labs.

## 2021-08-02 ENCOUNTER — TELEPHONE (OUTPATIENT)
Dept: FAMILY MEDICINE CLINIC | Facility: CLINIC | Age: 51
End: 2021-08-02

## 2021-08-02 RX ORDER — TIZANIDINE 4 MG/1
4 TABLET ORAL EVERY 8 HOURS PRN
Qty: 90 TABLET | Refills: 5 | Status: SHIPPED | OUTPATIENT
Start: 2021-08-02 | End: 2021-08-12 | Stop reason: SDUPTHER

## 2021-08-02 NOTE — TELEPHONE ENCOUNTER
Pharmacy Name: [id number] [address] [place] [address] AT [facility] & [address] [facility] [phone] [facility] [phone]      Pharmacy representative name: MOI     Pharmacy representative phone number: [phone]    What medication are you calling in regards to: tiZANidine (ZANAFLEX) 2 MG tablet    What question does the pharmacy have: PHARMACY STATES THE PATIENT WAS EXPECTING A 4 MG TABLET INSTEAD OF A 2 MG TABLET. THE PHARMACY WOULD LIKE TO MAKE SURE THE PROVIDER INTENDED THE PRESCRIPTION TO BE 2 MG TABLETS TO BE TAKEN AS TWO TABLETS EVERY EIGHT HOURS. THIS WAS WRITTEN AS A 45 DAY SUPPLY OF THE 2 MG TABLETS AND THE PATIENT WAS EXPECTING A 90 DAY SUPPLY OF THE 4 MG TABLETS.     Who is the provider that prescribed the medication: RAVIN BLUES     Additional notes:

## 2021-08-05 DIAGNOSIS — M54.12 CERVICAL RADICULOPATHY: ICD-10-CM

## 2021-08-07 DIAGNOSIS — M54.12 CERVICAL RADICULOPATHY: ICD-10-CM

## 2021-08-08 DIAGNOSIS — M54.12 CERVICAL RADICULOPATHY: ICD-10-CM

## 2021-08-09 RX ORDER — GABAPENTIN 800 MG/1
TABLET ORAL
Qty: 120 TABLET | OUTPATIENT
Start: 2021-08-09

## 2021-08-09 RX ORDER — GABAPENTIN 800 MG/1
800 TABLET ORAL 4 TIMES DAILY
Qty: 120 TABLET | Refills: 5 | OUTPATIENT
Start: 2021-08-09

## 2021-08-09 NOTE — TELEPHONE ENCOUNTER
Caller: Luz Elena Ballard    Relationship: Self    Best call back number: [phone]    What is the best time to reach you: ANY TIME    Who are you requesting to speak with (clinical staff, provider,  specific staff member): RAVIN ISLAS    Do you know the name of the person who called: SELF    What was the call regarding: PATIENT CHECKING ON THE STATUS OF HER MEDICATION REFILL FOR GABAPENTIN. PATIENT HAS BEEN WITHOUT MEDICATION FOR 2 DAYS    Do you require a callback: YES

## 2021-08-12 ENCOUNTER — HOSPITAL ENCOUNTER (OUTPATIENT)
Dept: MRI IMAGING | Facility: HOSPITAL | Age: 51
Discharge: HOME | End: 2021-08-12

## 2021-08-12 ENCOUNTER — OFFICE VISIT (OUTPATIENT)
Dept: FAMILY MEDICINE CLINIC | Facility: CLINIC | Age: 51
End: 2021-08-12

## 2021-08-12 VITALS
WEIGHT: 136.4 LBS | OXYGEN SATURATION: 99 % | RESPIRATION RATE: 15 BRPM | BODY MASS INDEX: 25.1 KG/M2 | TEMPERATURE: 98.5 F | DIASTOLIC BLOOD PRESSURE: 88 MMHG | HEART RATE: 86 BPM | SYSTOLIC BLOOD PRESSURE: 148 MMHG | HEIGHT: 62 IN

## 2021-08-12 DIAGNOSIS — M47.812 CHRONIC NECK PAIN WITH OSTEOARTHRITIS DETERMINED BY X-RAY: ICD-10-CM

## 2021-08-12 DIAGNOSIS — G62.2 POLYNEUROPATHY DUE TO OTHER TOXIC AGENTS (HCC): ICD-10-CM

## 2021-08-12 DIAGNOSIS — F19.10 SUBSTANCE ABUSE (HCC): ICD-10-CM

## 2021-08-12 DIAGNOSIS — L73.9 FOLLICULITIS: Primary | ICD-10-CM

## 2021-08-12 DIAGNOSIS — G89.29 CHRONIC MIDLINE LOW BACK PAIN WITHOUT SCIATICA: ICD-10-CM

## 2021-08-12 DIAGNOSIS — M54.50 CHRONIC MIDLINE LOW BACK PAIN WITHOUT SCIATICA: ICD-10-CM

## 2021-08-12 DIAGNOSIS — G89.29 CHRONIC NECK PAIN WITH OSTEOARTHRITIS DETERMINED BY X-RAY: ICD-10-CM

## 2021-08-12 PROCEDURE — 99214 OFFICE O/P EST MOD 30 MIN: CPT | Performed by: PHYSICIAN ASSISTANT

## 2021-08-12 PROCEDURE — 72148 MRI LUMBAR SPINE W/O DYE: CPT

## 2021-08-12 PROCEDURE — 72141 MRI NECK SPINE W/O DYE: CPT

## 2021-08-12 RX ORDER — DOXYCYCLINE 100 MG/1
100 CAPSULE ORAL 2 TIMES DAILY
Qty: 20 CAPSULE | Refills: 0 | Status: SHIPPED | OUTPATIENT
Start: 2021-08-12 | End: 2021-11-29

## 2021-08-12 NOTE — PROGRESS NOTES
Subjective   Luz Elena Ballard is a 51 y.o. female  Peripheral Neuropathy (here for f/u, requesting a written presciption for Gabapentin) and Cellulitis (has a few spots on both arms she would like for you to look at, she thinks they may be infected)      History of Present Illness  Patient is a 51-year-old female who comes today for follow-up on chronic polyneuropathy which has been treated with gabapentin in the past she is due for refills.  She states today that she is having concern regarding a couple of skin lesions on her arms.  She admits that she has intermittently been using IV methamphetamine.  She said she would want to stop using this drug but is struggling with this.  Patient has history of IV heroin use that she is no longer using this.  Has been checking her blood sugar regarding her diabetes and states blood sugar has been staying well controlled was 139 yesterday.  Understands that she will not be able to have gabapentin filled until she is able to get 3 of substance abuse/use.  The following portions of the patient's history were reviewed and updated as appropriate: allergies, current medications, past social history and problem list    Review of Systems   Constitutional: Negative for fever.   Respiratory: Negative.    Cardiovascular: Negative.    Musculoskeletal: Negative for arthralgias.   Skin: Positive for color change and wound. Negative for pallor and rash.        Area of wound on left forearm and right forearm both approximate half meter in diameter erythematous follicle with ulceration.   Psychiatric/Behavioral: Positive for hallucinations. The patient is nervous/anxious.        Objective     Vitals:    08/12/21 1518   BP: 148/88   Pulse: 86   Resp: 15   Temp: 98.5 °F (36.9 °C)   SpO2: 99%       Physical Exam  Vitals and nursing note reviewed.   Constitutional:       General: She is not in acute distress.     Appearance: Normal appearance. She is well-developed. She is not ill-appearing,  toxic-appearing or diaphoretic.   HENT:      Head: Normocephalic and atraumatic.   Neck:      Thyroid: No thyroid mass or thyromegaly.   Cardiovascular:      Rate and Rhythm: Normal rate and regular rhythm.      Heart sounds: Normal heart sounds.   Pulmonary:      Effort: Pulmonary effort is normal.   Skin:     General: Skin is warm and dry.      Coloration: Skin is not pale.      Findings: Erythema present. No rash.      Comments: 2 skin lesions 1 on each forearm each measuring half centimeter ulceration   Neurological:      Mental Status: She is alert and oriented to person, place, and time.   Psychiatric:         Attention and Perception: She is attentive.         Mood and Affect: Mood is anxious. Mood is not depressed. Affect is not angry or inappropriate.         Speech: Speech normal.         Behavior: Behavior normal.         Thought Content: Thought content normal.         Judgment: Judgment normal.         Assessment/Plan     Diagnoses and all orders for this visit:    1. Folliculitis (Primary)    2. 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 Substance abuse (CMS/HCC)    Other orders  -     doxycycline (Monodox) 100 MG capsule; Take 1 capsule by mouth 2 (Two) Times a Day. For infection  Dispense: 20 capsule; Refill: 0    Contact information given to patient for substance abuse treatment program strongly urged patient to contact substance abuse treatment program for inpatient treatment.

## 2021-09-21 RX ORDER — ALBUTEROL SULFATE 90 UG/1
2 AEROSOL, METERED RESPIRATORY (INHALATION) EVERY 4 HOURS PRN
Qty: 18 G | Refills: 2 | Status: SHIPPED | OUTPATIENT
Start: 2021-09-21 | End: 2022-04-25 | Stop reason: SDUPTHER

## 2021-09-21 NOTE — TELEPHONE ENCOUNTER
Caller: [id number] - [place], [place] - [address] [place] [address] AT [facility] & [address]  - [phone] [facility] [phone] FX    Relationship: Pharmacy      Medication requested (name and dosage): ALBUTEROL SULFATE  (90 BASE) MCG/ACT INHALER     [facility]     Additional details provided by patient: PATIENT IS OUT OF MEDICATION     Best call back number: [phone]    Does the patient have less than a 3 day supply:  [x] Yes  [] No    Mansi Robles Rep   09/21/21 12:32 EDT

## 2021-11-15 RX ORDER — METFORMIN HYDROCHLORIDE 500 MG/1
TABLET, FILM COATED, EXTENDED RELEASE ORAL
Qty: 30 TABLET | Refills: 6 | Status: SHIPPED | OUTPATIENT
Start: 2021-11-15 | End: 2021-11-29 | Stop reason: ALTCHOICE

## 2021-11-29 ENCOUNTER — OFFICE VISIT (OUTPATIENT)
Dept: FAMILY MEDICINE CLINIC | Facility: CLINIC | Age: 51
End: 2021-11-29

## 2021-11-29 ENCOUNTER — LAB (OUTPATIENT)
Dept: LAB | Facility: HOSPITAL | Age: 51
End: 2021-11-29

## 2021-11-29 VITALS
OXYGEN SATURATION: 99 % | BODY MASS INDEX: 28.16 KG/M2 | HEIGHT: 62 IN | DIASTOLIC BLOOD PRESSURE: 86 MMHG | HEART RATE: 81 BPM | WEIGHT: 153 LBS | TEMPERATURE: 97.2 F | SYSTOLIC BLOOD PRESSURE: 144 MMHG

## 2021-11-29 DIAGNOSIS — Z79.4 TYPE 2 DIABETES MELLITUS WITH HYPOGLYCEMIA WITHOUT COMA, WITH LONG-TERM CURRENT USE OF INSULIN (HCC): Primary | ICD-10-CM

## 2021-11-29 DIAGNOSIS — G89.29 CHRONIC MIDLINE LOW BACK PAIN WITH RIGHT-SIDED SCIATICA: ICD-10-CM

## 2021-11-29 DIAGNOSIS — B19.20 HEPATITIS C VIRUS INFECTION WITHOUT HEPATIC COMA, UNSPECIFIED CHRONICITY: ICD-10-CM

## 2021-11-29 DIAGNOSIS — R10.11 RIGHT UPPER QUADRANT ABDOMINAL PAIN: ICD-10-CM

## 2021-11-29 DIAGNOSIS — G62.2 POLYNEUROPATHY DUE TO OTHER TOXIC AGENTS (HCC): ICD-10-CM

## 2021-11-29 DIAGNOSIS — E11.649 TYPE 2 DIABETES MELLITUS WITH HYPOGLYCEMIA WITHOUT COMA, WITH LONG-TERM CURRENT USE OF INSULIN (HCC): Primary | ICD-10-CM

## 2021-11-29 DIAGNOSIS — M54.41 CHRONIC MIDLINE LOW BACK PAIN WITH RIGHT-SIDED SCIATICA: ICD-10-CM

## 2021-11-29 DIAGNOSIS — Z51.81 ENCOUNTER FOR THERAPEUTIC DRUG MONITORING: ICD-10-CM

## 2021-11-29 LAB
EXPIRATION DATE: NORMAL
GLUCOSE BLDC GLUCOMTR-MCNC: 146 MG/DL (ref 70–130)
HBA1C MFR BLD: 6.4 %
Lab: NORMAL

## 2021-11-29 PROCEDURE — 83036 HEMOGLOBIN GLYCOSYLATED A1C: CPT | Performed by: PHYSICIAN ASSISTANT

## 2021-11-29 PROCEDURE — 82962 GLUCOSE BLOOD TEST: CPT | Performed by: PHYSICIAN ASSISTANT

## 2021-11-29 PROCEDURE — 99214 OFFICE O/P EST MOD 30 MIN: CPT | Performed by: PHYSICIAN ASSISTANT

## 2021-11-29 RX ORDER — TIZANIDINE 4 MG/1
4 TABLET ORAL EVERY 8 HOURS PRN
COMMUNITY
Start: 2021-11-05 | End: 2022-03-02

## 2021-11-29 RX ORDER — ACYCLOVIR 400 MG/1
TABLET ORAL
COMMUNITY
Start: 2021-11-12 | End: 2022-01-19

## 2021-11-29 NOTE — PROGRESS NOTES
Subjective   Luz Elena Ballard is a 51 y.o. female  Peripheral Neuropathy (wants to discuss restarting gabapentin for polyneuropathy) and Abdominal Pain (Abnormal bloating and RUQ pain x1 week )      History of Present Illness  Patient is a 51-year-old male comes in for follow-up on diabetes mellitus as well as chronic low back pain with sciatica into right leg and history of a neuropathy in hands.  She also experiencing some abnormal abdominal bloating and right upper quadrant tenderness of the past week.  She has been diagnosed with hep C in the past is not seen her gastroenterologist in over a year.  Patient is struggled with substance abuse on and off over the last couple of years states that she has been sober for the past month.  Has been focusing on improving her overall health through improved diabetic diet, has been checking blood sugars regularly getting readings in normal range.  The following portions of the patient's history were reviewed and updated as appropriate: allergies, current medications, past social history and problem list    Review of Systems   Constitutional: Positive for appetite change and unexpected weight change. Negative for activity change, chills and fever.        Patient has been eating better but eating more over the last couple of months, weight is up 20 pounds   Respiratory: Negative.  Negative for cough, chest tightness and shortness of breath.    Cardiovascular: Negative for chest pain.   Gastrointestinal: Positive for abdominal distention and abdominal pain. Negative for anal bleeding, blood in stool, constipation, diarrhea, nausea, rectal pain and vomiting.   Genitourinary: Negative.  Negative for difficulty urinating and dysuria.   Musculoskeletal: Positive for back pain. Negative for arthralgias, gait problem and myalgias.   Skin: Negative for color change and rash.   Allergic/Immunologic: Positive for immunocompromised state. Negative for food allergies.   Neurological:  Positive for numbness. Negative for dizziness, tremors and weakness.   Psychiatric/Behavioral: Negative.        Objective     Vitals:    11/29/21 1512   BP: 144/86   Pulse: 81   Temp: 97.2 °F (36.2 °C)   SpO2: 99%       Physical Exam  Vitals and nursing note reviewed.   Constitutional:       General: She is not in acute distress.     Appearance: Normal appearance. She is well-developed. She is not ill-appearing, toxic-appearing or diaphoretic.   Eyes:      General: No scleral icterus.     Conjunctiva/sclera: Conjunctivae normal.   Cardiovascular:      Rate and Rhythm: Normal rate and regular rhythm.   Pulmonary:      Effort: Pulmonary effort is normal. No respiratory distress.      Breath sounds: Normal breath sounds.   Abdominal:      General: Bowel sounds are normal. There is no distension.      Palpations: Abdomen is soft. There is no mass.      Tenderness: There is abdominal tenderness ( Minimal right upper quadrant tenderness). There is no guarding or rebound.      Hernia: No hernia is present.      Comments: Abdomen is soft, rounded   Musculoskeletal:      Lumbar back: Tenderness and bony tenderness present. No swelling, deformity or spasms. Decreased range of motion.   Skin:     General: Skin is warm and dry.      Coloration: Skin is not jaundiced or pale.      Findings: No erythema or rash.   Neurological:      Mental Status: She is alert and oriented to person, place, and time.      Sensory: Sensory deficit ( Patient comments that hands feel tingly in the arms) present.      Motor: No weakness.      Gait: Gait normal.      Deep Tendon Reflexes: Reflexes are normal and symmetric.   Psychiatric:         Mood and Affect: Mood normal.         Behavior: Behavior normal.       A1c 6.4 glucose 146 discussed with patient.  Assessment/Plan     Diagnoses and all orders for this visit:    1. Type 2 diabetes mellitus with hypoglycemia without coma, with long-term current use of insulin (HCC) (Primary)  -     POCT  Glucose  -     POC Glycosylated Hemoglobin (Hb A1C)    2. Right upper quadrant abdominal pain  -     Hepatitis C Antibody; Future  -     Hepatic Function Panel; Future  -     CBC (No Diff); Future  -     Comprehensive metabolic panel; Future    3. 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 Chronic midline low back pain with right-sided sciatica    5. Encounter for therapeutic drug monitoring  -     Compliance Drug Analysis, Ur - Urine, Clean Catch; Future  -     Drug Analysis,Comp,Oral Fluid - Saliva,; Future    6. Hepatitis C virus infection without hepatic coma, unspecified chronicity     I will send a letter to patient with my detailed interpretation of patient's labs after I myself have received and reviewed the above ordered labs.  I discussed with patient that due to his history of substance abuse that we would need to do oral and urine drug screen on her today I would have to receive the results reviewed them and if normal/negative then would prescribe 1 months worth of gabapentin with her needing to follow-up monthly with drug screening may need to be tested monthly showing appropriate results for further refills can be given.  Substance agreement signed today.      Part of this note may be an electronic transcription/translation of spoken language to printed text using the Dragon Dictation System.

## 2021-12-06 ENCOUNTER — TELEPHONE (OUTPATIENT)
Dept: FAMILY MEDICINE CLINIC | Facility: CLINIC | Age: 51
End: 2021-12-06

## 2021-12-06 NOTE — TELEPHONE ENCOUNTER
Oral and urine drug screen is still pending per [facility]. Do you want her to go to a different lab to have her labs completed?

## 2021-12-06 NOTE — TELEPHONE ENCOUNTER
Caller: Luz Elena Ballard    Relationship: Self    Best call back number: [phone]    What was the call regarding:   PATIENT STATED THAT SHE WOULD LIKE A CALL BACK REGARDING HER LABS FOR THE DRUG ANALYSIS.     PATIENT STATED THAT SHE HAS TRIED TO GET HER LABS DRAWN FOR THE OTHER ORDER THAT HAVE BEEN PLACED BUT THE LAB WAS UNABLE TO DRAWN DUE TO HAVING TROUBLE FINDING A VAIN ON PATIENT. PATIENT WOULD LIKE A CALL BACK REGARDING THIS INFORMATION       Do you require a callback: YES

## 2021-12-07 ENCOUNTER — TELEPHONE (OUTPATIENT)
Dept: FAMILY MEDICINE CLINIC | Facility: CLINIC | Age: 51
End: 2021-12-07

## 2021-12-07 NOTE — TELEPHONE ENCOUNTER
If we are still awaiting results of the urine and oral drug screen then I am not able to give her answer gabapentin yet.  If she had trouble getting her other labs drawn I would suggest hydrating very well drinking more water and going back to the lab to have someone else try to draw them    OK FOR HUB TO RELAY MESSAGE TO PATIENT     LVM for patient on both numbers listed and sent message to [employer] account

## 2021-12-07 NOTE — TELEPHONE ENCOUNTER
PATIENT CALLED TO SEE IF LAB RESULTS HAVE CAME BACK, AND TO PLEASE CALL ON HER HOME NUMBER [phone]

## 2021-12-07 NOTE — TELEPHONE ENCOUNTER
If we are still awaiting results of the urine and oral drug screen then I am not able to give her answer gabapentin yet.  If she had trouble getting her other labs drawn I would suggest hydrating very well drinking more water and going back to the lab to have someone else try to draw them.

## 2021-12-08 DIAGNOSIS — R11.0 CHRONIC NAUSEA: ICD-10-CM

## 2021-12-08 RX ORDER — ONDANSETRON 8 MG/1
8 TABLET, ORALLY DISINTEGRATING ORAL EVERY 8 HOURS PRN
Qty: 15 TABLET | Refills: 3 | Status: SHIPPED | OUTPATIENT
Start: 2021-12-08 | End: 2023-03-21 | Stop reason: SDUPTHER

## 2021-12-08 NOTE — TELEPHONE ENCOUNTER
Caller: Mark Ballard    Relationship: Self    Best call back number: [phone]    Requested Prescriptions:   Requested Prescriptions     Pending Prescriptions Disp Refills   • ondansetron ODT (ZOFRAN-ODT) 8 MG disintegrating tablet 15 tablet 3        Pharmacy where request should be sent: [place], [place] - [address] [facility] [phone] [facility] [phone] FX     Additional details provided by patient: MARK SAID SHE IS OUT OF THIS MEDICATION.    Does the patient have less than a 3 day supply:  [x] Yes  [] No    Mansi Batista Rep   12/08/21 11:57 EST

## 2021-12-10 ENCOUNTER — LAB (OUTPATIENT)
Dept: LAB | Facility: HOSPITAL | Age: 51
End: 2021-12-10

## 2021-12-10 DIAGNOSIS — R10.11 RIGHT UPPER QUADRANT ABDOMINAL PAIN: ICD-10-CM

## 2021-12-12 ENCOUNTER — TELEMEDICINE (OUTPATIENT)
Dept: FAMILY MEDICINE CLINIC | Facility: TELEHEALTH | Age: 51
End: 2021-12-12

## 2021-12-12 VITALS — HEIGHT: 62 IN | BODY MASS INDEX: 28.16 KG/M2 | WEIGHT: 153 LBS

## 2021-12-12 DIAGNOSIS — J06.9 ACUTE URI: Primary | ICD-10-CM

## 2021-12-12 DIAGNOSIS — H92.03 OTALGIA OF BOTH EARS: ICD-10-CM

## 2021-12-12 DIAGNOSIS — Z11.52 ENCOUNTER FOR SCREENING FOR COVID-19: ICD-10-CM

## 2021-12-12 PROCEDURE — U0004 COV-19 TEST NON-CDC HGH THRU: HCPCS | Performed by: NURSE PRACTITIONER

## 2021-12-12 PROCEDURE — 99213 OFFICE O/P EST LOW 20 MIN: CPT | Performed by: NURSE PRACTITIONER

## 2021-12-12 RX ORDER — GUAIFENESIN 600 MG/1
600 TABLET, EXTENDED RELEASE ORAL 2 TIMES DAILY
Qty: 28 TABLET | Refills: 0 | Status: SHIPPED | OUTPATIENT
Start: 2021-12-12 | End: 2021-12-26

## 2021-12-12 RX ORDER — FLUCONAZOLE 150 MG/1
TABLET ORAL
Qty: 3 TABLET | Refills: 0 | Status: SHIPPED | OUTPATIENT
Start: 2021-12-12 | End: 2022-01-19

## 2021-12-12 RX ORDER — AZITHROMYCIN 250 MG/1
TABLET, FILM COATED ORAL
Qty: 6 TABLET | Refills: 0 | Status: SHIPPED | OUTPATIENT
Start: 2021-12-12 | End: 2022-01-19

## 2021-12-12 NOTE — PROGRESS NOTES
You have chosen to receive care through a telehealth visit.  Do you consent to use a video/audio connection for your medical care today? Yes     CHIEF COMPLAIN  Cc: congestion, sore throat, ear ache    HPI  Luz Elena Ballard is a 51 y.o. female  presents with complaint of nasal congestion, sore throat and ear ache. Her nasal congestion is reported as yellow. Other symptoms include bilateral ear pain, fever, post nasal drainage, runny nose, sinus pain and pressure, diarrhea and headaches. Over the counter she has tried mucinex and alkaseltzer. NO COVID-19 exposure. She is vaccinated.via the J&J vaccine. Her chart has been reviewed..    Review of Systems   Constitutional: Positive for fatigue and fever.   HENT: Positive for congestion (yellow), ear pain (bilateral), postnasal drip, rhinorrhea, sinus pressure and sinus pain. Sore throat: reolved.    Respiratory: Negative for cough, chest tightness, shortness of breath and wheezing.    Cardiovascular: Negative for chest pain.   Gastrointestinal: Positive for diarrhea. Negative for nausea and vomiting.   Musculoskeletal: Negative for myalgias.   Neurological: Positive for headaches.       Past Medical History:   Diagnosis Date   • Arthritis     Hx of.   • Basal cell carcinoma     Hx of.   • Cellulitis     Hx of. Resolved 2/15/2016.   • Cellulitis of finger     History of Cellulitis Fingers Right Middle Finger. Resolved 2/15/2016   • Depression     Hx of.   • Fatty liver    • History of Blocked tear duct     Resolved 2/15/2016   • History of migraine headaches    • Hypercholesterolemia     Hx of.   • Infectious viral hepatitis     HEP C   • IV drug abuse (HCC)     Hx of. Resolved 2/12/2016.   • Obstructive sleep apnea     Hx of.   • Sciatica     Hx of. Resolved. 2/15/2016. Resolved. 7/21/2015.   • Sciatica    • Skin cancer     Hx of.   • Upper respiratory infection     Hx of. 2/15/2016       Family History   Problem Relation Age of Onset   • Hypertension Mother    •  "Arthritis Mother    • Depression Mother    • Anxiety disorder Father    • Suicidality Father    • Depression Sister    • Diabetes Maternal Grandmother    • Breast cancer Neg Hx    • Ovarian cancer Neg Hx        Social History     Socioeconomic History   • Marital status:    Tobacco Use   • Smoking status: Light Tobacco Smoker   • Smokeless tobacco: Never Used   • Tobacco comment: Smokes less than half pack per week   Substance and Sexual Activity   • Alcohol use: No   • Drug use: Yes     Types: IV, Heroin   • Sexual activity: Yes     Partners: Male         Ht 157.5 cm (62\")   Wt 69.4 kg (153 lb)   BMI 27.98 kg/m²     PHYSICAL EXAM  Physical Exam   Constitutional: She is oriented to person, place, and time. She appears well-developed and well-nourished.   HENT:   Head: Normocephalic and atraumatic.   Right Ear: There is tenderness.   Left Ear: There is tenderness.   Nose: Congestion present. Right sinus exhibits maxillary sinus tenderness (patient directed exam) and frontal sinus tenderness (patient directed exam). Left sinus exhibits maxillary sinus tenderness (patient directed exam) and frontal sinus tenderness (patient directed exam).   Mouth/Throat: Mouth/Lips are normal.  Eyes: Lids are normal. Right eye exhibits no discharge and no exudate. Left eye exhibits no discharge and no exudate. Right conjunctiva is not injected. Left conjunctiva is not injected.   Pulmonary/Chest: No accessory muscle usage. No tachypnea and no bradypnea.  No respiratory distress.No use of oxygen by nasal cannulaNo use of oxygen by mask noted.  Abdominal: Abdomen appears normal.   Neurological: She is alert and oriented to person, place, and time. No cranial nerve deficit.   Skin: Her skin appears normal.  Psychiatric: She has a normal mood and affect. Her speech is normal and behavior is normal. Judgment and thought content normal.       Results for orders placed or performed during the hospital encounter of 12/10/21   POC " Glucose Once    Specimen: Blood   Result Value Ref Range    Glucose 125 70 - 130 mg/dL       Diagnoses and all orders for this visit:    1. Acute URI (Primary)  -     COVID-19,[facility] ROUTINE, NP/OP SWAB IN TRANSPORT MEDIA OR ESWAB 72 HR TAT - Swab, Nasopharynx; Future    2. Encounter for screening for COVID-19  -     COVID-19,[facility] ROUTINE, NP/OP SWAB IN TRANSPORT MEDIA OR ESWAB 72 HR TAT - Swab, Nasopharynx; Future    3. Otalgia of both ears    Other orders  -     azithromycin (Zithromax) 250 MG tablet; Take 2 tablets the first day, then 1 tablet daily for 4 days.  Dispense: 6 tablet; Refill: 0  -     fluconazole (Diflucan) 150 MG tablet; 150 mg tabs on days #1, 4 and 7 post antibiotics  Dispense: 3 tablet; Refill: 0  -     guaiFENesin (Mucinex) 600 MG 12 hr tablet; Take 1 tablet by mouth 2 (Two) Times a Day for 14 days.  Dispense: 28 tablet; Refill: 0    Probiotics for two weeks related to taking antibiotics. The pharmacist can help you with this if needed. Do not take within two hours of antibiotic.  If you still get a yeast despite taking probiotics you may take the fluconazole as ordered upon completion of the antibiotic  Mucinex with plenty of fluids especially water to thin secretions and help with congestion.  Isolate for 10 days from onset of symptoms and 24 hours fever/symptoms free without fever reducers  COVID-19 test and results pending. We will call results to you and they can also be found in MY Chart    FOLLOW UP  If symptoms worsen or persist follow up with PCP/[facility] or Urgent Care    Patient verbalizes understanding of medication dosage, comfort measures, instructions for treatment and follow-up.    Britni Chow, APRN  12/12/2021  09:40 EST    This visit was performed via Telehealth.  This patient has been instructed to follow-up with their primary care provider if their symptoms worsen or the treatment provided does not resolve their illness.

## 2021-12-12 NOTE — PATIENT INSTRUCTIONS
Upper Respiratory Infection, Adult  An upper respiratory infection (URI) is a common viral infection of the nose, throat, and upper air passages that lead to the lungs. The most common type of URI is the common cold. URIs usually get better on their own, without medical treatment.  What are the causes?  A URI is caused by a virus. You may catch a virus by:  · Breathing in droplets from an infected person's cough or sneeze.  · Touching something that has been exposed to the virus (contaminated) and then touching your mouth, nose, or eyes.  What increases the risk?  You are more likely to get a URI if:  · You are very young or very old.  · It is giuliano or winter.  · You have close contact with others, such as at a , school, or health care facility.  · You smoke.  · You have long-term (chronic) heart or lung disease.  · You have a weakened disease-fighting (immune) system.  · You have nasal allergies or asthma.  · You are experiencing a lot of stress.  · You work in an area that has poor air circulation.  · You have poor nutrition.  What are the signs or symptoms?  A URI usually involves some of the following symptoms:  · Runny or stuffy (congested) nose.  · Sneezing.  · Cough.  · Sore throat.  · Headache.  · Fatigue.  · Fever.  · Loss of appetite.  · Pain in your forehead, behind your eyes, and over your cheekbones (sinus pain).  · Muscle aches.  · Redness or irritation of the eyes.  · Pressure in the ears or face.  How is this diagnosed?  This condition may be diagnosed based on your medical history and symptoms, and a physical exam. Your health care provider may use a cotton swab to take a mucus sample from your nose (nasal swab). This sample can be tested to determine what virus is causing the illness.  How is this treated?  URIs usually get better on their own within 7-10 days. You can take steps at home to relieve your symptoms. Medicines cannot cure URIs, but your health care provider may recommend  certain medicines to help relieve symptoms, such as:  · Over-the-counter cold medicines.  · Cough suppressants. Coughing is a type of defense against infection that helps to clear the respiratory system, so take these medicines only as recommended by your health care provider.  · Fever-reducing medicines.  Follow these instructions at home:  Activity  · Rest as needed.  · If you have a fever, stay home from work or school until your fever is gone or until your health care provider says you are no longer contagious. Your health care provider may have you wear a face mask to prevent your infection from spreading.  Relieving symptoms  · Gargle with a salt-water mixture 3-4 times a day or as needed. To make a salt-water mixture, completely dissolve ½-1 tsp of salt in 1 cup of warm water.  · Use a cool-mist humidifier to add moisture to the air. This can help you breathe more easily.  Eating and drinking    · Drink enough fluid to keep your urine pale yellow.  · Eat soups and other clear broths.    General instructions    · Take over-the-counter and prescription medicines only as told by your health care provider. These include cold medicines, fever reducers, and cough suppressants.  · Do not use any products that contain nicotine or tobacco, such as cigarettes and e-cigarettes. If you need help quitting, ask your health care provider.  · Stay away from secondhand smoke.  · Stay up to date on all immunizations, including the yearly (annual) flu vaccine.  · Keep all follow-up visits as told by your health care provider. This is important.    How to prevent the spread of infection to others    · URIs can be passed from person to person (are contagious). To prevent the infection from spreading:  ? Wash your hands often with soap and water. If soap and water are not available, use hand .  ? Avoid touching your mouth, face, eyes, or nose.  ? Cough or sneeze into a tissue or your sleeve or elbow instead of into your  hand or into the air.    Contact a health care provider if:  · You are getting worse instead of better.  · You have a fever or chills.  · Your mucus is brown or red.  · You have yellow or brown discharge coming from your nose.  · You have pain in your face, especially when you bend forward.  · You have swollen neck glands.  · You have pain while swallowing.  · You have white areas in the back of your throat.  Get help right away if:  · You have shortness of breath that gets worse.  · You have severe or persistent:  ? Headache.  ? Ear pain.  ? Sinus pain.  ? Chest pain.  · You have chronic lung disease along with any of the following:  ? Wheezing.  ? Prolonged cough.  ? Coughing up blood.  ? A change in your usual mucus.  · You have a stiff neck.  · You have changes in your:  ? Vision.  ? Hearing.  ? Thinking.  ? Mood.  Summary  · An upper respiratory infection (URI) is a common infection of the nose, throat, and upper air passages that lead to the lungs.  · A URI is caused by a virus.  · URIs usually get better on their own within 7-10 days.  · Medicines cannot cure URIs, but your health care provider may recommend certain medicines to help relieve symptoms.  This information is not intended to replace advice given to you by your health care provider. Make sure you discuss any questions you have with your health care provider.  Document Revised: 12/26/2019 Document Reviewed: 08/03/2018  [employer] Patient Education © 2021 [employer]    COVID-19: What Your Test Results Mean  If you test positive for COVID-19  Take steps to help prevent the spread of COVID-19  Stay home.   Do not leave your home, except to get medical care. Do not visit public areas.  Get rest and stay hydrated.  Take over-the-counter medicines, such as acetaminophen, to help you feel better.  Stay in touch with your doctor.  Separate yourself from other people.   As much as possible, stay in a specific room and away from other people and pets in  your home.  If you test negative for COVID-19  · You probably were not infected at the time your sample was collected.  · However, that does not mean you will not get sick.  · It is possible that you were very early in your infection when your sample was collected and that you could test positive later.  A negative test result does not mean you won't get sick later.  cdc.gov/coronavirus  05/30/2020  This information is not intended to replace advice given to you by your health care provider. Make sure you discuss any questions you have with your health care provider.  Document Revised: 07/13/2021 Document Reviewed: 07/13/2021  [employer] Patient Education © 2021 [employer]      Earache, Adult  An earache, or ear pain, can be caused by many things, including:  · An infection.  · Ear wax buildup.  · Ear pressure.  · Something in the ear that should not be there (foreign body).  · A sore throat.  · Tooth problems.  · Jaw problems.  Treatment of the earache will depend on the cause. If the cause is not clear or cannot be determined, you may need to watch your symptoms until your earache goes away or until a cause is found.  Follow these instructions at home:  Medicines  · Take or apply over-the-counter and prescription medicines only as told by your health care provider.  · If you were prescribed an antibiotic medicine, use it as told by your health care provider. Do not stop using the antibiotic even if you start to feel better.  · Do not put anything in your ear other than medicine that is prescribed by your health care provider.  Managing pain  If directed, apply heat to the affected area as often as told by your health care provider. Use the heat source that your health care provider recommends, such as a moist heat pack or a heating pad.  · Place a towel between your skin and the heat source.  · Leave the heat on for 20-30 minutes.  · Remove the heat if your skin turns bright red. This is especially important if  you are unable to feel pain, heat, or cold. You may have a greater risk of getting burned.  If directed, put ice on the affected area as often as told by your health care provider. To do this:         · Put ice in a plastic bag.  · Place a towel between your skin and the bag.  · Leave the ice on for 20 minutes, 2-3 times a day.    General instructions  · Pay attention to any changes in your symptoms.  · Try resting in an upright position instead of lying down. This may help to reduce pressure in your ear and relieve pain.  · Chew gum if it helps to relieve your ear pain.  · Treat any allergies as told by your health care provider.  · Drink enough fluid to keep your urine pale yellow.  · It is up to you to get the results of any tests that were done. Ask your health care provider, or the department that is doing the tests, when your results will be ready.  · Keep all follow-up visits as told by your health care provider. This is important.  Contact a health care provider if:  · Your pain does not improve within 2 days.  · Your earache gets worse.  · You have new symptoms.  · You have a fever.  Get help right away if you:  · Have a severe headache.  · Have a stiff neck.  · Have trouble swallowing.  · Have redness or swelling behind your ear.  · Have fluid or blood coming from your ear.  · Have hearing loss.  · Feel dizzy.  Summary  · An earache, or ear pain, can be caused by many things.  · Treatment of the earache will depend on the cause. Follow recommendations from your health care provider to treat your ear pain.  · If the cause is not clear or cannot be determined, you may need to watch your symptoms until your earache goes away or until a cause is found.  · Keep all follow-up visits as told by your health care provider. This is important.  This information is not intended to replace advice given to you by your health care provider. Make sure you discuss any questions you have with your health care  provider.  Document Revised: 07/25/2020 Document Reviewed: 07/25/2020  [employer] Patient Education © 2021 [employer]

## 2021-12-20 ENCOUNTER — TELEPHONE (OUTPATIENT)
Dept: FAMILY MEDICINE CLINIC | Facility: CLINIC | Age: 51
End: 2021-12-20

## 2021-12-20 DIAGNOSIS — B18.2 CHRONIC HEPATITIS C WITHOUT HEPATIC COMA (HCC): Primary | ICD-10-CM

## 2021-12-20 NOTE — TELEPHONE ENCOUNTER
----- Message from Luz Elena Ballard sent at 12/20/2021  1:03 PM EST -----  Regarding: Labs  I haven't been able to get labs done. They still can't get me I have been several times with no success. Cani still be referred for my liver appointment?

## 2022-01-05 ENCOUNTER — TELEPHONE (OUTPATIENT)
Dept: FAMILY MEDICINE CLINIC | Facility: CLINIC | Age: 52
End: 2022-01-05

## 2022-01-19 ENCOUNTER — LAB (OUTPATIENT)
Dept: LAB | Facility: HOSPITAL | Age: 52
End: 2022-01-19

## 2022-01-19 ENCOUNTER — CLINICAL SUPPORT NO REQUIREMENTS (OUTPATIENT)
Dept: PREADMISSION TESTING | Facility: HOSPITAL | Age: 52
End: 2022-01-19

## 2022-01-19 ENCOUNTER — OFFICE VISIT (OUTPATIENT)
Dept: GASTROENTEROLOGY | Facility: CLINIC | Age: 52
End: 2022-01-19

## 2022-01-19 VITALS — WEIGHT: 157 LBS | HEIGHT: 62 IN | BODY MASS INDEX: 28.89 KG/M2 | TEMPERATURE: 97.7 F

## 2022-01-19 DIAGNOSIS — B18.2 CHRONIC HEPATITIS C WITHOUT HEPATIC COMA: ICD-10-CM

## 2022-01-19 DIAGNOSIS — B18.2 CHRONIC HEPATITIS C WITHOUT HEPATIC COMA: Primary | ICD-10-CM

## 2022-01-19 DIAGNOSIS — Z12.11 SCREEN FOR COLON CANCER: ICD-10-CM

## 2022-01-19 DIAGNOSIS — R14.0 BLOATING: ICD-10-CM

## 2022-01-19 DIAGNOSIS — Z01.812 ENCOUNTER FOR PREPROCEDURE SCREENING LABORATORY TESTING FOR COVID-19: ICD-10-CM

## 2022-01-19 DIAGNOSIS — R12 HEARTBURN: ICD-10-CM

## 2022-01-19 DIAGNOSIS — R74.8 ELEVATED LIVER ENZYMES: ICD-10-CM

## 2022-01-19 DIAGNOSIS — Z78.9 IMMUNE TO HEPATITIS B: ICD-10-CM

## 2022-01-19 DIAGNOSIS — Z78.9 IMMUNE TO HEPATITIS A: ICD-10-CM

## 2022-01-19 DIAGNOSIS — Z20.822 ENCOUNTER FOR PREPROCEDURE SCREENING LABORATORY TESTING FOR COVID-19: Primary | ICD-10-CM

## 2022-01-19 DIAGNOSIS — Z01.812 ENCOUNTER FOR PREPROCEDURE SCREENING LABORATORY TESTING FOR COVID-19: Primary | ICD-10-CM

## 2022-01-19 DIAGNOSIS — R19.7 DIARRHEA, UNSPECIFIED TYPE: ICD-10-CM

## 2022-01-19 DIAGNOSIS — R11.0 NAUSEA: ICD-10-CM

## 2022-01-19 DIAGNOSIS — R14.0 ABDOMINAL DISTENTION: ICD-10-CM

## 2022-01-19 DIAGNOSIS — Z20.822 ENCOUNTER FOR PREPROCEDURE SCREENING LABORATORY TESTING FOR COVID-19: ICD-10-CM

## 2022-01-19 DIAGNOSIS — R10.11 RIGHT UPPER QUADRANT ABDOMINAL PAIN: ICD-10-CM

## 2022-01-19 LAB
ALBUMIN SERPL-MCNC: 3.5 G/DL (ref 3.5–5.2)
ALBUMIN/GLOB SERPL: 1.1 G/DL
ALP SERPL-CCNC: 121 U/L (ref 39–117)
ALT SERPL W P-5'-P-CCNC: 31 U/L (ref 1–33)
ANION GAP SERPL CALCULATED.3IONS-SCNC: 10.9 MMOL/L (ref 5–15)
AST SERPL-CCNC: 32 U/L (ref 1–32)
BASOPHILS # BLD AUTO: 0.03 10*3/MM3 (ref 0–0.2)
BASOPHILS NFR BLD AUTO: 0.5 % (ref 0–1.5)
BILIRUB SERPL-MCNC: 0.5 MG/DL (ref 0–1.2)
BUN SERPL-MCNC: 8 MG/DL (ref 6–20)
BUN/CREAT SERPL: 11.6 (ref 7–25)
CALCIUM SPEC-SCNC: 8.9 MG/DL (ref 8.6–10.5)
CHLORIDE SERPL-SCNC: 106 MMOL/L (ref 98–107)
CO2 SERPL-SCNC: 23.1 MMOL/L (ref 22–29)
CREAT SERPL-MCNC: 0.69 MG/DL (ref 0.57–1)
DEPRECATED RDW RBC AUTO: 44.8 FL (ref 37–54)
EOSINOPHIL # BLD AUTO: 0.09 10*3/MM3 (ref 0–0.4)
EOSINOPHIL NFR BLD AUTO: 1.5 % (ref 0.3–6.2)
ERYTHROCYTE [DISTWIDTH] IN BLOOD BY AUTOMATED COUNT: 13.5 % (ref 12.3–15.4)
GFR SERPL CREATININE-BSD FRML MDRD: 90 ML/MIN/1.73
GLOBULIN UR ELPH-MCNC: 3.3 GM/DL
GLUCOSE SERPL-MCNC: 124 MG/DL (ref 65–99)
HCT VFR BLD AUTO: 34.4 % (ref 34–46.6)
HGB BLD-MCNC: 11.4 G/DL (ref 12–15.9)
HIV1+2 AB SER QL: NORMAL
IMM GRANULOCYTES # BLD AUTO: 0.01 10*3/MM3 (ref 0–0.05)
IMM GRANULOCYTES NFR BLD AUTO: 0.2 % (ref 0–0.5)
INR PPP: 1.07 (ref 0.85–1.16)
LYMPHOCYTES # BLD AUTO: 1.83 10*3/MM3 (ref 0.7–3.1)
LYMPHOCYTES NFR BLD AUTO: 30 % (ref 19.6–45.3)
MCH RBC QN AUTO: 30.2 PG (ref 26.6–33)
MCHC RBC AUTO-ENTMCNC: 33.1 G/DL (ref 31.5–35.7)
MCV RBC AUTO: 91.2 FL (ref 79–97)
MONOCYTES # BLD AUTO: 0.35 10*3/MM3 (ref 0.1–0.9)
MONOCYTES NFR BLD AUTO: 5.7 % (ref 5–12)
NEUTROPHILS NFR BLD AUTO: 3.78 10*3/MM3 (ref 1.7–7)
NEUTROPHILS NFR BLD AUTO: 62.1 % (ref 42.7–76)
NRBC BLD AUTO-RTO: 0.2 /100 WBC (ref 0–0.2)
PLATELET # BLD AUTO: 128 10*3/MM3 (ref 140–450)
PMV BLD AUTO: 11.4 FL (ref 6–12)
POTASSIUM SERPL-SCNC: 3.7 MMOL/L (ref 3.5–5.2)
PROT SERPL-MCNC: 6.8 G/DL (ref 6–8.5)
PROTHROMBIN TIME: 13.6 SECONDS (ref 11.4–14.4)
RBC # BLD AUTO: 3.77 10*6/MM3 (ref 3.77–5.28)
SARS-COV-2 RNA PNL SPEC NAA+PROBE: NOT DETECTED
SODIUM SERPL-SCNC: 140 MMOL/L (ref 136–145)
WBC NRBC COR # BLD: 6.09 10*3/MM3 (ref 3.4–10.8)

## 2022-01-19 PROCEDURE — 85610 PROTHROMBIN TIME: CPT

## 2022-01-19 PROCEDURE — 81596 NFCT DS CHRNC HCV 6 ASSAYS: CPT

## 2022-01-19 PROCEDURE — 82248 BILIRUBIN DIRECT: CPT

## 2022-01-19 PROCEDURE — 85027 COMPLETE CBC AUTOMATED: CPT

## 2022-01-19 PROCEDURE — 99214 OFFICE O/P EST MOD 30 MIN: CPT | Performed by: NURSE PRACTITIONER

## 2022-01-19 PROCEDURE — 87902 NFCT AGT GNTYP ALYS HEP C: CPT

## 2022-01-19 PROCEDURE — U0004 COV-19 TEST NON-CDC HGH THRU: HCPCS

## 2022-01-19 PROCEDURE — 85025 COMPLETE CBC W/AUTO DIFF WBC: CPT

## 2022-01-19 PROCEDURE — C9803 HOPD COVID-19 SPEC COLLECT: HCPCS

## 2022-01-19 PROCEDURE — 36415 COLL VENOUS BLD VENIPUNCTURE: CPT

## 2022-01-19 PROCEDURE — 87522 HEPATITIS C REVRS TRNSCRPJ: CPT

## 2022-01-19 PROCEDURE — 80053 COMPREHEN METABOLIC PANEL: CPT

## 2022-01-19 PROCEDURE — 86803 HEPATITIS C AB TEST: CPT

## 2022-01-19 PROCEDURE — G0432 EIA HIV-1/HIV-2 SCREEN: HCPCS

## 2022-01-19 RX ORDER — LANSOPRAZOLE 30 MG/1
30 CAPSULE, DELAYED RELEASE ORAL DAILY
Qty: 90 CAPSULE | Refills: 3 | Status: SHIPPED | OUTPATIENT
Start: 2022-01-19 | End: 2022-05-09 | Stop reason: SDUPTHER

## 2022-01-19 NOTE — PROGRESS NOTES
GASTROENTEROLOGY OFFICE NOTE    Luz Elena Ballard  [id number]  1970    CARE TEAM  Patient Care Team:  Toña Beckford PA-C as PCP - General (Family Medicine)    Referring Provider: Toña Beckford PA-C    Chief Complaint   Patient presents with   • Abdominal Pain   • Diarrhea   • Nausea   • Hepatitis        HISTORY OF PRESENT ILLNESS:   Luz Elena Ballard is a 51 y.o. female who returns to the clinic today for follow up regarding treatment naive hepatitis C, abdominal distention, abdominal pain, nausea and diarrhea. Her last office visit was 9/24/2018 with Ms. Macarena Lee due to hepatitis C, elevated liver enzymes.  Labs ordered and recommendation to stop using drugs and continue treatment at [facility]. 9/24/2018 HCV FibroSure revealed F2, GGT elevated, ALT elevated.  Hepatitis B surface antibody reactive indicating immunity , hepatitis A antibody positive indicating immunity, HCV genotype 1a, HCV quant 11 million. She mentions someone previously informed her she may have cirrhosis. She continues to use heroin and methamphetamines approximately 1 time per day via IV route.  She reports she has up to 5-6 bowel movements per day which began 6 months to 1 year ago.  She has not had prior EGD nor colonoscopy.  She reports visible distention of her abdomen as well as abdominal discomfort.  She also has nausea and heartburn.  She reports prior use of lansoprazole 30 mg was helpful for burning and she would like a prescription for lansoprazole.  Her last office visit was 9/24/2018 with Ms. Macarena Lee due to hepatitis C, elevated liver enzymes.  Labs ordered and recommendation to stop using drugs and continue treatment at [facility]. 9/24/2018 HCV FibroSure revealed F2, GGT elevated, ALT elevated.  Hepatitis B surface antibody reactive, hepatitis A antibody positive genotype Ia, HCV quant 11 million.    Past Medical History:   Diagnosis Date   • Arthritis     Hx of.   • Basal cell carcinoma      Hx of.   • Cellulitis     Hx of. Resolved 2/15/2016.   • Cellulitis of finger     History of Cellulitis Fingers Right Middle Finger. Resolved 2/15/2016   • Depression     Hx of.   • Fatty liver    • History of Blocked tear duct     Resolved 2/15/2016   • History of migraine headaches    • Hypercholesterolemia     Hx of.   • Infectious viral hepatitis     HEP C   • IV drug abuse (HCC)     Hx of. Resolved 2/12/2016.   • Obstructive sleep apnea     Hx of.   • Sciatica     Hx of. Resolved. 2/15/2016. Resolved. 7/21/2015.   • Sciatica    • Skin cancer     Hx of.   • Upper respiratory infection     Hx of. 2/15/2016        Past Surgical History:   Procedure Laterality Date   • BREAST SURGERY  2000    Reconstruction   • CHOLECYSTECTOMY  2000   • HYSTERECTOMY  08/06/2003   • OTHER SURGICAL HISTORY      Tubal Ligation   • REDUCTION MAMMAPLASTY Bilateral 2002   • TUBAL ABDOMINAL LIGATION  1992        Current Outpatient Medications on File Prior to Visit   Medication Sig   • albuterol sulfate  (90 Base) MCG/ACT inhaler Inhale 2 puffs Every 4 (Four) Hours As Needed for Wheezing.   • azelastine (OPTIVAR) 0.05 % ophthalmic solution Administer 1 drop to both eyes Daily. As needed for allergic eye sx   • diclofenac (VOLTAREN) 75 MG EC tablet Take 1 tablet by mouth 2 (Two) Times a Day.   • DULoxetine (CYMBALTA) 30 MG capsule New dose, take 90mg daily for anxiety/depression and chronic pain   • glucose blood test strip Check blood sugar twice daily   • glucose monitor monitoring kit 1 each 2 (two) times a day. Check blood sugar twice daily   • Insulin Glargine (LANTUS SOLOSTAR) 100 UNIT/ML injection pen Inject 20 Units under the skin into the appropriate area as directed Every Night. For diabetes   • Insulin Pen Needle 31G X 8 MM misc 20 Units Every Night.   • Lancets 30G misc 1 stick 2 (two) times a day.   • lisinopril (PRINIVIL,ZESTRIL) 20 MG tablet Take 1 tablet by mouth Daily.   • metFORMIN ER (GLUCOPHAGE-XR) 500 MG 24 hr  "tablet Take 1 tablet by mouth Daily Before Supper.   • ondansetron ODT (ZOFRAN-ODT) 8 MG disintegrating tablet Place 1 tablet on the tongue Every 8 (Eight) Hours As Needed for Nausea or Vomiting.   • tiZANidine (ZANAFLEX) 4 MG tablet Take 4 mg by mouth Every 8 (Eight) Hours As Needed. Take 4 mg by mouth Every 8 (Eight) Hours As Needed.     No current facility-administered medications on file prior to visit.       Allergies   Allergen Reactions   • Keflex [Cephalexin] GI Intolerance   • Sulfa Antibiotics      Sulfa drugs   • Vancomycin Rash       Family History   Problem Relation Age of Onset   • Hypertension Mother    • Arthritis Mother    • Depression Mother    • Anxiety disorder Father    • Suicidality Father    • Depression Sister    • Diabetes Maternal Grandmother    • Breast cancer Neg Hx    • Ovarian cancer Neg Hx    • Colon cancer Neg Hx        Social History     Socioeconomic History   • Marital status:    Tobacco Use   • Smoking status: Light Tobacco Smoker   • Smokeless tobacco: Never Used   • Tobacco comment: Smokes less than half pack per week   Vaping Use   • Vaping Use: Never used   Substance and Sexual Activity   • Alcohol use: No   • Drug use: Yes     Types: IV, Heroin   • Sexual activity: Yes     Partners: Male       PHYSICAL EXAM   Temp 97.7 °F (36.5 °C) (Temporal)   Ht 157.5 cm (62\")   Wt 71.2 kg (157 lb)   BMI 28.72 kg/m²   Physical Exam  Constitutional:       General: She is not in acute distress.     Appearance: She is not toxic-appearing.   HENT:      Head: Normocephalic and atraumatic. No contusion.      Right Ear: External ear normal.      Left Ear: External ear normal.   Eyes:      General: Lids are normal. No scleral icterus.        Right eye: No discharge.         Left eye: No discharge.      Extraocular Movements: Extraocular movements intact.   Neck:      Trachea: Trachea normal.      Comments: No visible mass  No visible adenopathy  Cardiovascular:      Rate and Rhythm: " Normal rate.   Pulmonary:      Effort: No respiratory distress.      Comments: Symmetrical expansion    Abdominal:      General: There is distension.      Palpations: Abdomen is soft. There is no mass.      Tenderness: There is generalized abdominal tenderness.   Musculoskeletal:         General: Swelling present.      Comments: Symmetrical movement of upper extremities  Symmetrical movement of lower extremities  Swelling noted to hands.   Skin:     General: Skin is warm and dry.      Coloration: Skin is not jaundiced.   Neurological:      General: No focal deficit present.      Mental Status: She is alert and oriented to person, place, and time.   Psychiatric:         Mood and Affect: Mood normal.         Behavior: Behavior normal.         Thought Content: Thought content normal.     Results Review:  9/24/2018 HCV FibroSure revealed F2, GGT elevated, ALT elevated.  Hepatitis B surface antibody reactive, hepatitis A antibody positive genotype Ia, HCV quant 11 million. 12/1/2021 urine drug screen positive for methamphetamines and amphetamines fentanyl, norfentanyl. She is established at [facility] and reports she is working toward sobriety.   CMP    CMP 2/22/21 2/23/21 1/19/22   Glucose 597 (A) 732 (A) 124 (A)   BUN 4 (A) 6 8   Creatinine 0.74 0.88 0.69   eGFR Non African Am 83 68 90   Sodium 127 (A) 126 (A) 140   Potassium 4.0 3.4 (A) 3.7   Chloride 89 (A) 92 (A) 106   Calcium 9.4 9.1 8.9   Albumin 3.80 3.80 3.50   Total Bilirubin 1.4 (A) 1.1 0.5   Alkaline Phosphatase 151 (A) 168 (A) 121 (A)   AST (SGOT) 52 (A) 41 (A) 32   ALT (SGPT) 55 (A) 42 (A) 31   (A) Abnormal value            CBC    CBC 2/22/21 2/23/21 1/19/22 1/19/22      1407 1409   WBC 8.97 7.74 6.09 6.09   RBC 4.67 4.46 3.77 3.77   Hemoglobin 15.2 14.4 11.4 (A) 11.4 (A)   Hematocrit 44.6 41.5 34.4 34.4   MCV 95.5 93.0 91.2 91.2   MCH 32.5 32.3 30.2 30.2   MCHC 34.1 34.7 33.1 33.1   RDW 12.3 11.9 (A) 13.5 13.5   Platelets 159 158 128 (A) 128 (A)   (A) Abnormal  value              ASSESSMENT / PLAN  1. Chronic hepatitis C without hepatic coma (HCC)  - 2018 labs revealed genotype 1a, VL 11 million, F2 by  fibrosure. She is treatment naive. I have encouraged her to continue care at [facility] and work towards sobriety. Send for repeat labs as below. I need to try to determine if she has cirrhosis prior to prescribing treatment. Await results of labs, US and possibly EGD (see below) before prescribing treatment.   She is immune to hepatitis A and B. Recommend patient avoid sharing personal  items such as nail clippers, razor blades, toothbrushes, tweezers; recommend barrier protection if sexually active and recommend partner(s) undergo HCV testing if not done previously.     - CBC & Differential; Future  - Comprehensive Metabolic Panel; Future  - HCV FibroSURE; Future  - HCV RNA By PCR, Qn Rfx Annita; Future  - HIV-1 & HIV-2 Antibodies; Future  - Protime-INR; Future  - US Abdomen Complete; Future    2. Bloating and Abdominal distention  - I am concerned that she has ascites. I would like for her to get an US asap to determine if she has ascites. If she has ascites, she may need paracentesis and start diuretics if kidney function is not abnormal.  I have discussed recommendation to limit salt intake to no more than 2 grams per day.     3. Diarrhea, unspecified type  -unknown etiology at this time. Send stool studies to evaluate for infection. Start fiber supplement daily to try to bulk bowel movements. Due to abdominal distention, she may have diarrhea from constipation (and/or possible ascites). Recommend EGD and colonoscopy for further evaluation of symptoms. She does take duloxetine and diclofenac which have risk of triggering microscopic colitis.   - Gastrointestinal Panel, PCR - Stool, Per Rectum; Future  - Clostridium Difficile Toxin, PCR - Stool, Per Rectum; Future  - Psyllium Husk 100 % powder; Take 5.8 g by mouth Daily. Mix with 8 oz of any liquid  Dispense: 174 g; Refill:  12    4. Heartburn  -plan for EGD for further evaluation. Start lansoprazole 30 mg once daily, 30 minutes prior to a meal.   - lansoprazole (PREVACID) 30 MG capsule; Take 1 capsule by mouth Daily. 30 minutes prior to a meal  Dispense: 90 capsule; Refill: 3    5. Nausea   - EGD for further evaluation.   6. Elevated liver enzymes  - likely due to HCV, continued drug abuse possibly contributing. Send for labs, monitor liver enzymes and if elevated after treatment of HCV, send autoimmune and metabolic workup for elevated liver enzymes      Return for Follow-up after Colonoscopy, Follow-up after EGD.    Ashley Mccormick, APRN  01/19/2022

## 2022-01-20 ENCOUNTER — HOSPITAL ENCOUNTER (OUTPATIENT)
Dept: ULTRASOUND IMAGING | Facility: HOSPITAL | Age: 52
Discharge: HOME | End: 2022-01-20
Admitting: NURSE PRACTITIONER

## 2022-01-20 DIAGNOSIS — B18.2 CHRONIC HEPATITIS C WITHOUT HEPATIC COMA: ICD-10-CM

## 2022-01-20 LAB
BILIRUB CONJ SERPL-MCNC: <0.2 MG/DL (ref 0–0.3)
DEPRECATED RDW RBC AUTO: 44.8 FL (ref 37–54)
ERYTHROCYTE [DISTWIDTH] IN BLOOD BY AUTOMATED COUNT: 13.5 % (ref 12.3–15.4)
HCT VFR BLD AUTO: 34.4 % (ref 34–46.6)
HCV AB SER DONR QL: REACTIVE
HGB BLD-MCNC: 11.4 G/DL (ref 12–15.9)
MCH RBC QN AUTO: 30.2 PG (ref 26.6–33)
MCHC RBC AUTO-ENTMCNC: 33.1 G/DL (ref 31.5–35.7)
MCV RBC AUTO: 91.2 FL (ref 79–97)
PLATELET # BLD AUTO: 128 10*3/MM3 (ref 140–450)
PMV BLD AUTO: 11.4 FL (ref 6–12)
RBC # BLD AUTO: 3.77 10*6/MM3 (ref 3.77–5.28)
WBC NRBC COR # BLD: 6.09 10*3/MM3 (ref 3.4–10.8)

## 2022-01-20 PROCEDURE — 76700 US EXAM ABDOM COMPLETE: CPT

## 2022-01-20 NOTE — PROGRESS NOTES
The ultrasound shows abnormal appearance of the liver likely indicating cirrhosis including enlarged spleen which happens due to cirrhosis. There is a small amount of fluid around the spleen. At this time, I recommend limiting salt (sodium) intake to less than 2,000 mg or 2 grams per day. Recommend plan to proceed with EGD and colonoscopy as scheduled. Both procedures are important due to your symptoms as I do not suspect abdominal distention to be due to fluid (ascites) and the upper scope needs to be done to evaluate for changes in the stomach and esophagus that can result from possible cirrhosis.

## 2022-01-21 ENCOUNTER — OUTSIDE FACILITY SERVICE (OUTPATIENT)
Dept: GASTROENTEROLOGY | Facility: CLINIC | Age: 52
End: 2022-01-21

## 2022-01-24 ENCOUNTER — TELEPHONE (OUTPATIENT)
Dept: GASTROENTEROLOGY | Facility: CLINIC | Age: 52
End: 2022-01-24

## 2022-01-24 LAB
HCV GENTYP SERPL NAA+PROBE: NORMAL
HCV GENTYP SERPL NAA+PROBE: NORMAL
HCV RNA SERPL NAA+PROBE-ACNC: NORMAL IU/ML
HCV RNA SERPL NAA+PROBE-LOG IU: 6.6 LOG10 IU/ML
LABORATORY COMMENT REPORT: NORMAL
REF LAB TEST REF RANGE: NORMAL

## 2022-01-24 NOTE — TELEPHONE ENCOUNTER
CALLED PATIENT BACK RETURNING A VCM ABOUT SCHEDULING EGD AND COLON, LOOKS LIKE SHE ALREADY HAD A DOUBLE ON 1/21? ASKED HER ON THE VCM TO GIVE ME A CALL BACK.

## 2022-01-24 NOTE — PROGRESS NOTES
Recent labs revealed elevated glucose, elevated alkaline phosphatase; HIV negative; CBC with normal WBC, slightly low hemoglobin and low platelet count. We will plan to repeat  labs in the future and likely evaluate iron deficiency, B12 deficiency and folate deficiency in the future.  I am awaiting results on 2 tests regarding hepatitis C and will send those results when received. Thank you!   Consider Epclusa for treatment of HCV due to low platelet count but HCV fibrosure result has not been received.

## 2022-01-25 DIAGNOSIS — Z20.822 ENCOUNTER FOR PREPROCEDURE SCREENING LABORATORY TESTING FOR COVID-19: Primary | ICD-10-CM

## 2022-01-25 DIAGNOSIS — Z01.812 ENCOUNTER FOR PREPROCEDURE SCREENING LABORATORY TESTING FOR COVID-19: Primary | ICD-10-CM

## 2022-01-25 LAB
A2 MACROGLOB SERPL-MCNC: 249 MG/DL (ref 110–276)
ALT SERPL W P-5'-P-CCNC: 33 IU/L (ref 0–40)
APO A-I SERPL-MCNC: 113 MG/DL (ref 116–209)
BILIRUB SERPL-MCNC: 0.5 MG/DL (ref 0–1.2)
FIBROSIS SCORING:: ABNORMAL
FIBROSIS STAGE SERPL QL: ABNORMAL
GGT SERPL-CCNC: 57 IU/L (ref 0–60)
HAPTOGLOB SERPL-MCNC: 64 MG/DL (ref 42–296)
HCV AB SER QL: ABNORMAL
LABORATORY COMMENT REPORT: ABNORMAL
LIVER FIBR SCORE SERPL CALC.FIBROSURE: 0.5 (ref 0–0.21)
NECROINFLAMM ACTIVITY SCORING:: ABNORMAL
NECROINFLAMMATORY ACT GRADE SERPL QL: ABNORMAL
NECROINFLAMMATORY ACT SCORE SERPL: 0.21 (ref 0–0.17)
SERVICE CMNT-IMP: ABNORMAL

## 2022-01-26 NOTE — PROGRESS NOTES
I would like to prescribe Epclusa (generic substitution is fine) for treatment of chronic hepatitis C, genotype 1a, VL 3.97 million, and suspect cirrhosis due to abnormal US and low platelet count even though fibrosure has a result of F2. Thanks!

## 2022-01-27 ENCOUNTER — TELEPHONE (OUTPATIENT)
Dept: GASTROENTEROLOGY | Facility: CLINIC | Age: 52
End: 2022-01-27

## 2022-01-27 ENCOUNTER — CLINICAL SUPPORT NO REQUIREMENTS (OUTPATIENT)
Dept: PREADMISSION TESTING | Facility: HOSPITAL | Age: 52
End: 2022-01-27

## 2022-01-27 DIAGNOSIS — Z20.822 ENCOUNTER FOR PREPROCEDURE SCREENING LABORATORY TESTING FOR COVID-19: ICD-10-CM

## 2022-01-27 DIAGNOSIS — Z01.812 ENCOUNTER FOR PREPROCEDURE SCREENING LABORATORY TESTING FOR COVID-19: ICD-10-CM

## 2022-01-27 LAB — SARS-COV-2 RNA PNL SPEC NAA+PROBE: NOT DETECTED

## 2022-01-27 PROCEDURE — U0004 COV-19 TEST NON-CDC HGH THRU: HCPCS

## 2022-01-27 PROCEDURE — C9803 HOPD COVID-19 SPEC COLLECT: HCPCS

## 2022-01-27 NOTE — TELEPHONE ENCOUNTER
PATIENT COULDN'T MAKE IT TO THEIR COVID TEST YESTERDAY, SO I HAVE ADDED HER FOR A RAPID COVID TEST AT THE HOSPITAL AND SHE HAS WENT TO HER COVID TEST- SHE JUST CALLED ME BACK AND HAD QUESTIONS REGARDING HER PREP FOR TODAY, WENT OVER ALL INSTRUCTIONS, AGAIN FOR PREP DAY AND DAY OF PROCEDURE.

## 2022-01-27 NOTE — TELEPHONE ENCOUNTER
----- Message from GAGE Delgado sent at 1/26/2022  9:13 AM EST -----  I would like to prescribe Epclusa (generic substitution is fine) for treatment of chronic hepatitis C, genotype 1a, VL 3.97 million, and suspect cirrhosis due to abnormal US and low platelet count even though fibrosure has a result of F2. Thanks!

## 2022-01-28 ENCOUNTER — TELEPHONE (OUTPATIENT)
Dept: GASTROENTEROLOGY | Facility: CLINIC | Age: 52
End: 2022-01-28

## 2022-01-28 ENCOUNTER — OUTSIDE FACILITY SERVICE (OUTPATIENT)
Dept: GASTROENTEROLOGY | Facility: CLINIC | Age: 52
End: 2022-01-28

## 2022-01-28 PROCEDURE — 43239 EGD BIOPSY SINGLE/MULTIPLE: CPT | Performed by: INTERNAL MEDICINE

## 2022-01-28 PROCEDURE — 88305 TISSUE EXAM BY PATHOLOGIST: CPT | Performed by: INTERNAL MEDICINE

## 2022-01-28 RX ORDER — CARVEDILOL 3.12 MG/1
3.12 TABLET ORAL 2 TIMES DAILY WITH MEALS
Qty: 60 TABLET | Refills: 11 | Status: SHIPPED | OUTPATIENT
Start: 2022-01-28 | End: 2022-06-27 | Stop reason: SDUPTHER

## 2022-01-28 NOTE — TELEPHONE ENCOUNTER
Endo nurse called dr vital wants a repeat colon in six weeks using [facility]-tab transferred to [facility]

## 2022-01-31 ENCOUNTER — LAB REQUISITION (OUTPATIENT)
Dept: LAB | Facility: HOSPITAL | Age: 52
End: 2022-01-31

## 2022-01-31 DIAGNOSIS — Z12.11 ENCOUNTER FOR SCREENING FOR MALIGNANT NEOPLASM OF COLON: ICD-10-CM

## 2022-01-31 DIAGNOSIS — K76.6 PORTAL HYPERTENSION: ICD-10-CM

## 2022-01-31 DIAGNOSIS — I85.01 ESOPHAGEAL VARICES WITH BLEEDING: ICD-10-CM

## 2022-01-31 DIAGNOSIS — R19.7 DIARRHEA, UNSPECIFIED: ICD-10-CM

## 2022-01-31 DIAGNOSIS — R12 HEARTBURN: ICD-10-CM

## 2022-01-31 DIAGNOSIS — Z13.810 ENCOUNTER FOR SCREENING FOR UPPER GASTROINTESTINAL DISORDER: ICD-10-CM

## 2022-01-31 DIAGNOSIS — K31.89 OTHER DISEASES OF STOMACH AND DUODENUM: ICD-10-CM

## 2022-01-31 DIAGNOSIS — I85.00 ESOPHAGEAL VARICES WITHOUT BLEEDING: ICD-10-CM

## 2022-01-31 DIAGNOSIS — R11.0 NAUSEA: ICD-10-CM

## 2022-02-01 ENCOUNTER — LAB (OUTPATIENT)
Dept: LAB | Facility: HOSPITAL | Age: 52
End: 2022-02-01

## 2022-02-01 DIAGNOSIS — R19.7 DIARRHEA, UNSPECIFIED TYPE: ICD-10-CM

## 2022-02-01 LAB
C DIFF TOX GENS STL QL NAA+PROBE: NOT DETECTED
CYTO UR: NORMAL
LAB AP CASE REPORT: NORMAL
LAB AP CLINICAL INFORMATION: NORMAL
PATH REPORT.FINAL DX SPEC: NORMAL
PATH REPORT.GROSS SPEC: NORMAL

## 2022-02-01 PROCEDURE — 87493 C DIFF AMPLIFIED PROBE: CPT

## 2022-02-16 ENCOUNTER — TELEPHONE (OUTPATIENT)
Dept: GASTROENTEROLOGY | Facility: CLINIC | Age: 52
End: 2022-02-16

## 2022-02-16 NOTE — TELEPHONE ENCOUNTER
----- Message from GAGE Delgado sent at 2/15/2022  5:04 PM EST -----  Regarding: FW: Prescription for Hep C  She has follow up on 3/10/2022, if there is a sooner appointment that is open, please offer it to her.   If we have insurance approval for Epclusa, I would recommend getting the medication while it is approved by insurance. I recommend she take it but if she needs additional information before she starts it, she can talk to me and/or the pharmacist. If we have approval please document when the PA expires as it would be ideal for her to start Epclusa and get the 2 refills before the PA ends.   Thanks!   ----- Message -----  From: Sangita Hutchins LPN  Sent: 2/15/2022   4:09 PM EST  To: GAGE Delgado  Subject: FW: Prescription for Hep C                         ----- Message -----  From: Luz Elena Ballard  Sent: 2/15/2022   3:54 PM EST  To: [facility]  Subject: Prescription for Hep C                           I talked with the pharmacy and decided to put on hold the medication for Hep C. I would like time to research the medicine first. Also I am electing to not get colonoscopy at this time. I would still like to schedule an appointment to come back and see you to answer some questions that I have. Can I just go ahead and schedule a follow up to see you?

## 2022-02-16 NOTE — TELEPHONE ENCOUNTER
I called patient and left voice message for her to call us back so we can move her appointment up.

## 2022-02-17 ENCOUNTER — TELEPHONE (OUTPATIENT)
Dept: GASTROENTEROLOGY | Facility: CLINIC | Age: 52
End: 2022-02-17

## 2022-02-17 NOTE — TELEPHONE ENCOUNTER
I called patient and moved her appointment up with Ashley ALMONTE. I also let patient know the Pas  after a couple of months. Patient verbalized understanding.

## 2022-02-19 DIAGNOSIS — I10 ESSENTIAL HYPERTENSION: Chronic | ICD-10-CM

## 2022-02-19 RX ORDER — SOD SULF/POT CHLORIDE/MAG SULF 1.479 G
1 TABLET ORAL ONCE
Qty: 24 TABLET | Refills: 0 | Status: SHIPPED | OUTPATIENT
Start: 2022-02-19 | End: 2022-02-19

## 2022-02-21 RX ORDER — LISINOPRIL 20 MG/1
TABLET ORAL
Qty: 30 TABLET | Refills: 1 | Status: SHIPPED | OUTPATIENT
Start: 2022-02-21 | End: 2022-05-06

## 2022-02-21 RX ORDER — DICLOFENAC SODIUM 75 MG/1
TABLET, DELAYED RELEASE ORAL
Qty: 60 TABLET | Refills: 1 | Status: SHIPPED | OUTPATIENT
Start: 2022-02-21 | End: 2022-05-09 | Stop reason: SDUPTHER

## 2022-03-02 RX ORDER — TIZANIDINE 4 MG/1
TABLET ORAL
Qty: 90 TABLET | Refills: 1 | Status: SHIPPED | OUTPATIENT
Start: 2022-03-02 | End: 2022-05-04

## 2022-03-03 ENCOUNTER — TELEPHONE (OUTPATIENT)
Dept: GASTROENTEROLOGY | Facility: CLINIC | Age: 52
End: 2022-03-03

## 2022-03-03 NOTE — TELEPHONE ENCOUNTER
I called [facility] and had patients Hepatitis C medication put on hold per patient request. Patient stated she wants to wait before getting this treatment to research it.

## 2022-03-04 ENCOUNTER — OUTSIDE FACILITY SERVICE (OUTPATIENT)
Dept: GASTROENTEROLOGY | Facility: CLINIC | Age: 52
End: 2022-03-04

## 2022-03-22 PROCEDURE — 87086 URINE CULTURE/COLONY COUNT: CPT | Performed by: NURSE PRACTITIONER

## 2022-03-23 ENCOUNTER — TELEPHONE (OUTPATIENT)
Dept: FAMILY MEDICINE CLINIC | Facility: CLINIC | Age: 52
End: 2022-03-23

## 2022-04-26 RX ORDER — ALBUTEROL SULFATE 90 UG/1
2 AEROSOL, METERED RESPIRATORY (INHALATION) EVERY 4 HOURS PRN
Qty: 18 G | Refills: 2 | Status: SHIPPED | OUTPATIENT
Start: 2022-04-26

## 2022-05-04 RX ORDER — TIZANIDINE 4 MG/1
TABLET ORAL
Qty: 90 TABLET | Refills: 0 | Status: SHIPPED | OUTPATIENT
Start: 2022-05-04 | End: 2022-06-27

## 2022-05-06 DIAGNOSIS — I10 ESSENTIAL HYPERTENSION: Chronic | ICD-10-CM

## 2022-05-06 RX ORDER — LISINOPRIL 20 MG/1
TABLET ORAL
Qty: 30 TABLET | Refills: 0 | Status: SHIPPED | OUTPATIENT
Start: 2022-05-06 | End: 2022-06-07

## 2022-05-09 DIAGNOSIS — R12 HEARTBURN: ICD-10-CM

## 2022-05-09 NOTE — TELEPHONE ENCOUNTER
Caller: Luz Elena Ballard    Relationship: Self    Best call back number:[phone]     Requested Prescriptions:   Requested Prescriptions     Pending Prescriptions Disp Refills   • diclofenac (VOLTAREN) 75 MG EC tablet 60 tablet 1     Sig: Take 1 tablet by mouth 2 (Two) Times a Day.   • lansoprazole (PREVACID) 30 MG capsule 90 capsule 3     Sig: Take 1 capsule by mouth Daily. 30 minutes prior to a meal        Pharmacy where request should be sent: [place], [place] - [address] - [phone] [facility] [phone] FX     Additional details provided by patient: PATIENT HAS 2 DAYS LEFT     Does the patient have less than a 3 day supply:  [x] Yes  [] No    Mansi Hare Rep   05/09/22 14:19 EDT

## 2022-05-10 RX ORDER — LANSOPRAZOLE 30 MG/1
30 CAPSULE, DELAYED RELEASE ORAL DAILY
Qty: 90 CAPSULE | Refills: 3 | Status: SHIPPED | OUTPATIENT
Start: 2022-05-10 | End: 2022-06-27 | Stop reason: SDUPTHER

## 2022-05-10 RX ORDER — DICLOFENAC SODIUM 75 MG/1
75 TABLET, DELAYED RELEASE ORAL 2 TIMES DAILY
Qty: 60 TABLET | Refills: 1 | Status: SHIPPED | OUTPATIENT
Start: 2022-05-10 | End: 2022-06-27 | Stop reason: SDUPTHER

## 2022-06-06 DIAGNOSIS — I10 ESSENTIAL HYPERTENSION: Chronic | ICD-10-CM

## 2022-06-07 RX ORDER — LISINOPRIL 20 MG/1
TABLET ORAL
Qty: 30 TABLET | Refills: 0 | Status: SHIPPED | OUTPATIENT
Start: 2022-06-07 | End: 2022-06-27 | Stop reason: SDUPTHER

## 2022-06-16 ENCOUNTER — TELEPHONE (OUTPATIENT)
Dept: NEUROSURGERY | Facility: CLINIC | Age: 52
End: 2022-06-16

## 2022-06-16 NOTE — TELEPHONE ENCOUNTER
Best call back number :[phone]  RECEIVED A CALL FROM PATIENT'S NURSE  FROM [facility].   RESCHEDULED APPOINTMENT.

## 2022-06-27 DIAGNOSIS — I10 ESSENTIAL HYPERTENSION: Chronic | ICD-10-CM

## 2022-06-27 DIAGNOSIS — R12 HEARTBURN: ICD-10-CM

## 2022-06-27 RX ORDER — TIZANIDINE 4 MG/1
TABLET ORAL
Qty: 90 TABLET | Refills: 0 | Status: SHIPPED | OUTPATIENT
Start: 2022-06-27 | End: 2022-08-10 | Stop reason: SDUPTHER

## 2022-06-27 NOTE — TELEPHONE ENCOUNTER
----- Message from Luz Elena Ballard sent at 6/27/2022  4:38 PM EDT -----  Regarding: Refills   I am the daughter of Luz Elena Ballard & l have requested refills on all her daily medications. She is in rehab & l need to send her medications. Thank you   Kenzie Ballard   [phone]

## 2022-06-27 NOTE — TELEPHONE ENCOUNTER
----- Message from Luz Elena Ballard sent at 6/27/2022  4:38 PM EDT -----  Regarding: Refills   I am the daughter of Luz Elena Ballard & l have requested refills on all her daily medications. She is in rehab & l need to send her medications. Thank you   Kenzie Ballard   [phone]

## 2022-06-28 RX ORDER — DULOXETIN HYDROCHLORIDE 30 MG/1
CAPSULE, DELAYED RELEASE ORAL
Qty: 90 CAPSULE | Refills: 11 | Status: SHIPPED | OUTPATIENT
Start: 2022-06-28 | End: 2022-08-10

## 2022-06-28 RX ORDER — DICLOFENAC SODIUM 75 MG/1
75 TABLET, DELAYED RELEASE ORAL 2 TIMES DAILY
Qty: 60 TABLET | Refills: 1 | Status: SHIPPED | OUTPATIENT
Start: 2022-06-28 | End: 2022-07-27 | Stop reason: SDUPTHER

## 2022-06-28 RX ORDER — LANSOPRAZOLE 30 MG/1
30 CAPSULE, DELAYED RELEASE ORAL DAILY
Qty: 90 CAPSULE | Refills: 3 | Status: SHIPPED | OUTPATIENT
Start: 2022-06-28 | End: 2022-07-27 | Stop reason: SDUPTHER

## 2022-06-28 RX ORDER — CARVEDILOL 3.12 MG/1
3.12 TABLET ORAL 2 TIMES DAILY WITH MEALS
Qty: 60 TABLET | Refills: 11 | Status: SHIPPED | OUTPATIENT
Start: 2022-06-28 | End: 2022-08-10 | Stop reason: SDUPTHER

## 2022-06-28 RX ORDER — LISINOPRIL 20 MG/1
20 TABLET ORAL DAILY
Qty: 30 TABLET | Refills: 0 | Status: SHIPPED | OUTPATIENT
Start: 2022-06-28 | End: 2022-07-27 | Stop reason: SDUPTHER

## 2022-06-28 RX ORDER — METFORMIN HYDROCHLORIDE 500 MG/1
500 TABLET, EXTENDED RELEASE ORAL
Qty: 30 TABLET | Refills: 5 | Status: SHIPPED | OUTPATIENT
Start: 2022-06-28 | End: 2022-07-27 | Stop reason: SDUPTHER

## 2022-07-05 ENCOUNTER — TELEPHONE (OUTPATIENT)
Dept: GASTROENTEROLOGY | Facility: CLINIC | Age: 52
End: 2022-07-05

## 2022-07-05 NOTE — TELEPHONE ENCOUNTER
----- Message from Anni Castillo MA sent at 2022  1:39 PM EDT -----  Regarding: FW: Help     ----- Message -----  From: Luz Elena Ballard  Sent: 2022   1:33 PM EDT  To: [facility]  Subject: Help                                             I am the daughter of Luz Elena Ballard , she was incarcerated and then sent to a rehab but a non medical facility. She isn’t being treated fairly and her medical conditions have not be addressed . She has been denied any medical treatment . I have tried sending past visit summaries and test results I have found on here indicating she has cirrhosis in hopes she can be transferred somewhere where she has medical supervision but no luck yet. I am writing in hopes that l could get a letter of some sort or anything to help me with this matter .  Thanks in advance   Kenzie Ballard   [phone]     STEFANIE Mccormick, GAGE 2022: I spoke to Kenzie Ballard regarding message above. I have not seen Luz Elena Ballard since 2022. Following that office visit, I recommended treatment for hepatitis C. The office got Epclusa approved by insurance but review of documentation revealed the patient did not want to proceed with treatment and never started Epclusa. Epclusa approval  2022, patient will need office follow up to attempt to get Epclusa approved again. I also recommended EGD to evaluate for varices, portal hypertensive gastropathy and due to nausea, heartburn, diarrhea, bloating as well as colonoscopy due to diarrhea but she did not have procedures done. At this time, I would recommend follow up at our office for lab orders and US liver order, low salt diet of less than 2,000 mg per day and avoid NSAIDs.   I do not think I have documentation to support that she needs medical supervision based off suspected cirrhosis as I have not seen the patient in 5 to 6 months

## 2022-07-26 RX ORDER — PEN NEEDLE, DIABETIC 31 GX5/16"
NEEDLE, DISPOSABLE MISCELLANEOUS
Qty: 1 EACH | Refills: 0 | Status: SHIPPED | OUTPATIENT
Start: 2022-07-26

## 2022-07-26 RX ORDER — BLOOD SUGAR DIAGNOSTIC
STRIP MISCELLANEOUS
Qty: 100 EACH | Refills: 0 | Status: SHIPPED | OUTPATIENT
Start: 2022-07-26 | End: 2022-08-10

## 2022-07-27 DIAGNOSIS — I10 ESSENTIAL HYPERTENSION: Chronic | ICD-10-CM

## 2022-07-27 DIAGNOSIS — R12 HEARTBURN: ICD-10-CM

## 2022-07-28 RX ORDER — DICLOFENAC SODIUM 75 MG/1
75 TABLET, DELAYED RELEASE ORAL 2 TIMES DAILY
Qty: 60 TABLET | Refills: 1 | Status: SHIPPED | OUTPATIENT
Start: 2022-07-28

## 2022-07-28 RX ORDER — AZELASTINE HYDROCHLORIDE 0.5 MG/ML
1 SOLUTION/ DROPS OPHTHALMIC DAILY
Qty: 6 ML | Refills: 12 | Status: SHIPPED | OUTPATIENT
Start: 2022-07-28 | End: 2023-03-14

## 2022-07-28 RX ORDER — METFORMIN HYDROCHLORIDE 500 MG/1
500 TABLET, EXTENDED RELEASE ORAL
Qty: 30 TABLET | Refills: 5 | Status: SHIPPED | OUTPATIENT
Start: 2022-07-28 | End: 2022-08-10

## 2022-07-28 RX ORDER — LANSOPRAZOLE 30 MG/1
30 CAPSULE, DELAYED RELEASE ORAL DAILY
Qty: 90 CAPSULE | Refills: 3 | Status: SHIPPED | OUTPATIENT
Start: 2022-07-28 | End: 2022-08-10 | Stop reason: SDUPTHER

## 2022-07-28 RX ORDER — LISINOPRIL 20 MG/1
20 TABLET ORAL DAILY
Qty: 30 TABLET | Refills: 0 | Status: SHIPPED | OUTPATIENT
Start: 2022-07-28 | End: 2022-08-10 | Stop reason: SDUPTHER

## 2022-07-28 RX ORDER — CARVEDILOL 3.12 MG/1
3.12 TABLET ORAL 2 TIMES DAILY WITH MEALS
Qty: 60 TABLET | Refills: 11 | OUTPATIENT
Start: 2022-07-28

## 2022-08-08 ENCOUNTER — TELEPHONE (OUTPATIENT)
Dept: GASTROENTEROLOGY | Facility: CLINIC | Age: 52
End: 2022-08-08

## 2022-08-10 ENCOUNTER — OFFICE VISIT (OUTPATIENT)
Dept: FAMILY MEDICINE CLINIC | Facility: CLINIC | Age: 52
End: 2022-08-10

## 2022-08-10 VITALS
SYSTOLIC BLOOD PRESSURE: 144 MMHG | BODY MASS INDEX: 27.05 KG/M2 | OXYGEN SATURATION: 98 % | DIASTOLIC BLOOD PRESSURE: 86 MMHG | HEIGHT: 62 IN | TEMPERATURE: 97 F | HEART RATE: 72 BPM | WEIGHT: 147 LBS

## 2022-08-10 DIAGNOSIS — E11.9 TYPE 2 DIABETES MELLITUS WITHOUT COMPLICATION, UNSPECIFIED WHETHER LONG TERM INSULIN USE: Primary | ICD-10-CM

## 2022-08-10 DIAGNOSIS — I10 ESSENTIAL HYPERTENSION: Chronic | ICD-10-CM

## 2022-08-10 DIAGNOSIS — F41.9 ANXIETY: ICD-10-CM

## 2022-08-10 DIAGNOSIS — R12 HEARTBURN: ICD-10-CM

## 2022-08-10 LAB
EXPIRATION DATE: NORMAL
GLUCOSE BLDC GLUCOMTR-MCNC: 153 MG/DL (ref 70–130)
HBA1C MFR BLD: 6.7 %
Lab: NORMAL

## 2022-08-10 PROCEDURE — 83036 HEMOGLOBIN GLYCOSYLATED A1C: CPT | Performed by: PHYSICIAN ASSISTANT

## 2022-08-10 PROCEDURE — 82962 GLUCOSE BLOOD TEST: CPT | Performed by: PHYSICIAN ASSISTANT

## 2022-08-10 PROCEDURE — 99214 OFFICE O/P EST MOD 30 MIN: CPT | Performed by: PHYSICIAN ASSISTANT

## 2022-08-10 RX ORDER — LANSOPRAZOLE 30 MG/1
30 CAPSULE, DELAYED RELEASE ORAL DAILY
Qty: 90 CAPSULE | Refills: 3 | Status: SHIPPED | OUTPATIENT
Start: 2022-08-10

## 2022-08-10 RX ORDER — CARVEDILOL 3.12 MG/1
3.12 TABLET ORAL 2 TIMES DAILY WITH MEALS
Qty: 60 TABLET | Refills: 11 | Status: SHIPPED | OUTPATIENT
Start: 2022-08-10

## 2022-08-10 RX ORDER — LISINOPRIL 20 MG/1
20 TABLET ORAL DAILY
Qty: 30 TABLET | Refills: 5 | Status: SHIPPED | OUTPATIENT
Start: 2022-08-10

## 2022-08-10 RX ORDER — DULOXETIN HYDROCHLORIDE 30 MG/1
CAPSULE, DELAYED RELEASE ORAL
Qty: 30 CAPSULE | Refills: 11 | Status: SHIPPED | OUTPATIENT
Start: 2022-08-10 | End: 2022-09-09

## 2022-08-10 RX ORDER — TIZANIDINE 4 MG/1
4 TABLET ORAL EVERY 8 HOURS PRN
Qty: 90 TABLET | Refills: 11 | Status: SHIPPED | OUTPATIENT
Start: 2022-08-10 | End: 2023-03-08

## 2022-08-10 NOTE — PROGRESS NOTES
"Subjective   Luz Elena Ballard is a 52 y.o. female  Diabetes (Follow up on diabetes, some medications were discontinued.), Anxiety (Follow up on anxiety, anxiety gotten worse since release from Rehab last Friday ), and Med Refill (Med refill on lisinopril, Prevacid, Carvedilol and tizanidine )      History of Present Illness     The patient presents for follow-up on diabetes mellitus, generalized anxiety disorder, hypertension, GERD, and chronic back pain.    The patient reports she was recently \"in rehab for 60 days\". She has been sober for approximately 90 days. She notes she has been experiencing anxiety. She denies visiting a mental health specialist while in rehabilitation. She did not take her medications while she was in rehabilitation. Her A1c is 6.7 percent. She notes she \"got used to eating right\" while in rehabilitation. She is currently taking insulin and an oral diabetes medication. She notes her blood glucose reading was 111 mg/dL on 08/09/2022. She notes she experienced a blood glucose reading over 700 mg/dL in the past. She notes her blood pressure was 164/104 mm/Hg on 08/09/2022 but \"it's way better today\". She is not currently taking medication for hypertension.     She has a history of hepatitis and stage 4 psoriasis. She has been visiting a gastroenterologist and she will be having a liver biopsy performed in the future.     She has taken Cymbalta in the past which provided good relief.     The following portions of the patient's history were reviewed and updated as appropriate: allergies, current medications, past social history and problem list    Review of Systems   Constitutional: Negative for appetite change and fatigue.   Respiratory: Negative for chest tightness and shortness of breath.    Cardiovascular: Negative.    Gastrointestinal: Negative for abdominal pain, diarrhea and nausea.   Neurological: Negative for dizziness, tremors, weakness, light-headedness and headaches. "   Psychiatric/Behavioral: Positive for dysphoric mood and sleep disturbance. Negative for agitation, behavioral problems, confusion, decreased concentration, hallucinations, self-injury and suicidal ideas. The patient is nervous/anxious. The patient is not hyperactive.        Objective     Vitals:    08/10/22 1616   BP: 144/86   Pulse: 72   Temp: 97 °F (36.1 °C)   SpO2: 98%       Physical Exam  Vitals and nursing note reviewed.   Constitutional:       General: She is not in acute distress.     Appearance: Normal appearance. She is well-developed. She is not ill-appearing, toxic-appearing or diaphoretic.   HENT:      Head: Normocephalic and atraumatic.   Neck:      Thyroid: No thyroid mass or thyromegaly.      Vascular: No carotid bruit or JVD.   Cardiovascular:      Rate and Rhythm: Normal rate and regular rhythm.      Pulses: Normal pulses.      Heart sounds: Normal heart sounds. No murmur heard.  Pulmonary:      Effort: Pulmonary effort is normal. No respiratory distress.      Breath sounds: Normal breath sounds.   Abdominal:      Palpations: Abdomen is soft.      Tenderness: There is no abdominal tenderness.   Skin:     General: Skin is warm and dry.   Neurological:      Mental Status: She is alert and oriented to person, place, and time.   Psychiatric:         Attention and Perception: Attention and perception normal. She is attentive.         Mood and Affect: Mood is anxious. Mood is not depressed. Affect is not angry or inappropriate.         Speech: Speech normal.         Behavior: Behavior normal.         Thought Content: Thought content normal.         Cognition and Memory: Cognition normal.         Judgment: Judgment normal.         Assessment & Plan     Diagnoses and all orders for this visit:    1. Type 2 diabetes mellitus without complication, unspecified whether long term insulin use (HCC) (Primary)  -     POC Glycosylated Hemoglobin (Hb A1C)  -     POCT Glucose    2. Essential hypertension  -      lisinopril (PRINIVIL,ZESTRIL) 20 MG tablet; Take 1 tablet by mouth Daily. For BP  Dispense: 30 tablet; Refill: 5    3. Heartburn  -     lansoprazole (PREVACID) 30 MG capsule; Take 1 capsule by mouth Daily. 30 minutes prior to a meal  Dispense: 90 capsule; Refill: 3    4. Anxiety  -     Ambulatory Referral to Behavioral Health  -     Ambulatory Referral to Psychiatry    Other orders  -     tiZANidine (ZANAFLEX) 4 MG tablet; Take 1 tablet by mouth Every 8 (Eight) Hours As Needed for Muscle Spasms.  Dispense: 90 tablet; Refill: 11  -     carvedilol (Coreg) 3.125 MG tablet; Take 1 tablet by mouth 2 (Two) Times a Day With Meals.  Dispense: 60 tablet; Refill: 11  -     DULoxetine (CYMBALTA) 30 MG capsule; TAKE ONE DAILY FOR ANXIETY AND DEPRESSION  Dispense: 30 capsule; Refill: 11       Diabetes  The patient will continue monitoring her blood glucose at home but if she experiences high readings, she is to contact the office. She will continue to monitor her diet.     Anxiety  The patient will be prescribed Cymbalta 30 mg. She will need to contact the office if her anxiety does not improve in 1 week. The patient will be referred to a behavioral health specialist.     Answers for HPI/ROS submitted by the patient on 8/9/2022  Please describe your symptoms.: Refill medications  Have you had these symptoms before?: Yes  How long have you been having these symptoms?: Greater than 2 weeks  What is the primary reason for your visit?: Other    Transcribed from ambient dictation for Toña Beckford PA-C by TIEN PIMENTEL.  08/10/22   17:28 EDT    Patient verbalized consent to the visit recording.  I have personally performed the services described in this document as transcribed by the above individual, and it is both accurate and complete.  Toña Beckford PA-C  8/11/2022  12:49 EDT

## 2022-08-17 ENCOUNTER — TELEMEDICINE (OUTPATIENT)
Dept: PSYCHIATRY | Facility: CLINIC | Age: 52
End: 2022-08-17

## 2022-08-17 DIAGNOSIS — F19.94 SUBSTANCE INDUCED MOOD DISORDER: ICD-10-CM

## 2022-08-17 DIAGNOSIS — F41.1 GAD (GENERALIZED ANXIETY DISORDER): Primary | Chronic | ICD-10-CM

## 2022-08-17 PROCEDURE — 90792 PSYCH DIAG EVAL W/MED SRVCS: CPT | Performed by: PHYSICIAN ASSISTANT

## 2022-08-17 RX ORDER — LAMOTRIGINE 25 MG/1
25 TABLET ORAL 2 TIMES DAILY
Qty: 60 TABLET | Refills: 1 | Status: SHIPPED | OUTPATIENT
Start: 2022-08-17 | End: 2022-08-19 | Stop reason: SDUPTHER
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
## 2022-08-18 PROBLEM — F19.94 SUBSTANCE INDUCED MOOD DISORDER (HCC): Status: ACTIVE | Noted: 2022-08-18

## 2022-08-18 RX ORDER — LAMOTRIGINE 25 MG/1
25 TABLET ORAL 2 TIMES DAILY
Qty: 60 TABLET | Refills: 1 | Status: CANCELLED | OUTPATIENT
Start: 2022-08-18 | End: 2023-08-18

## 2022-08-18 NOTE — TELEPHONE ENCOUNTER
Patient left this message regarding her Lamictal Rx that I sent during her initial visit.  I am not sure why she would be on my schedule of not on her insurance.  Can you please check to see if we need to do somethin on her behalf?  Thank you.    Pharmacy says  isn't covered on my insurance. Can't get rx filled. Do we need to call [facility] for referral or pre-cert for the rx?

## 2022-08-19 RX ORDER — LAMOTRIGINE 25 MG/1
25 TABLET ORAL 2 TIMES DAILY
Qty: 60 TABLET | Refills: 1 | Status: SHIPPED | OUTPATIENT
Start: 2022-08-19 | End: 2022-09-09

## 2022-08-19 NOTE — TELEPHONE ENCOUNTER
Additional details provided by patient: BEHAVIORAL HEALTH PROVIDER  IS NOT COVERED BY HER INSURANCE AND THEY ARE NOT WANTING TO COVER THIS MEDICATION FROM HER. SHE IS ASKING FOR HER PCP TO PRESCRIBE THIS INSTEAD.

## 2022-08-19 NOTE — TELEPHONE ENCOUNTER
Caller: Luz Elena Ballard    Relationship: Self    Best call back number: [phone]    Requested Prescriptions:   Requested Prescriptions     Pending Prescriptions Disp Refills   • lamoTRIgine (LaMICtal) 25 MG tablet 60 tablet 1     Sig: Take 1 tablet by mouth 2 (Two) Times a Day. For mood stabilizer        Pharmacy where request should be sent: [id number] [address] AT [facility] & [address] - [phone]  - [phone] FX     Additional details provided by patient: BEHAVIORAL HEALTH PROVIDER  IS NOT COVERED BY HER INSURANCE AND THEY ARE NOT WANTING TO COVER THIS MEDICATION FROM HER. SHE IS ASKING FOR HER PCP TO PRESCRIBE THIS INSTEAD.     Does the patient have less than a 3 day supply:  [x] Yes  [] No    Mansi Zhang Rep   08/19/22 11:13 EDT

## 2022-08-26 ENCOUNTER — LAB (OUTPATIENT)
Dept: LAB | Facility: HOSPITAL | Age: 52
End: 2022-08-26

## 2022-08-26 ENCOUNTER — TELEPHONE (OUTPATIENT)
Dept: FAMILY MEDICINE CLINIC | Facility: CLINIC | Age: 52
End: 2022-08-26

## 2022-08-26 ENCOUNTER — OFFICE VISIT (OUTPATIENT)
Dept: GASTROENTEROLOGY | Facility: CLINIC | Age: 52
End: 2022-08-26

## 2022-08-26 VITALS
BODY MASS INDEX: 26.91 KG/M2 | HEIGHT: 62 IN | WEIGHT: 146.2 LBS | TEMPERATURE: 97.5 F | DIASTOLIC BLOOD PRESSURE: 88 MMHG | HEART RATE: 72 BPM | SYSTOLIC BLOOD PRESSURE: 138 MMHG | OXYGEN SATURATION: 98 %

## 2022-08-26 DIAGNOSIS — B18.2 CHRONIC HEPATITIS C WITHOUT HEPATIC COMA: ICD-10-CM

## 2022-08-26 DIAGNOSIS — K31.89 PORTAL HYPERTENSIVE GASTROPATHY: ICD-10-CM

## 2022-08-26 DIAGNOSIS — Z12.11 SCREEN FOR COLON CANCER: ICD-10-CM

## 2022-08-26 DIAGNOSIS — Z78.9 IMMUNE TO HEPATITIS B: ICD-10-CM

## 2022-08-26 DIAGNOSIS — R74.8 ELEVATED ALKALINE PHOSPHATASE LEVEL: ICD-10-CM

## 2022-08-26 DIAGNOSIS — Z78.9 IMMUNE TO HEPATITIS A: ICD-10-CM

## 2022-08-26 DIAGNOSIS — K74.69 OTHER CIRRHOSIS OF LIVER: Primary | ICD-10-CM

## 2022-08-26 DIAGNOSIS — K74.69 OTHER CIRRHOSIS OF LIVER: ICD-10-CM

## 2022-08-26 DIAGNOSIS — R19.7 DIARRHEA, UNSPECIFIED TYPE: ICD-10-CM

## 2022-08-26 DIAGNOSIS — I85.10 SECONDARY ESOPHAGEAL VARICES WITHOUT BLEEDING: ICD-10-CM

## 2022-08-26 DIAGNOSIS — K76.6 PORTAL HYPERTENSIVE GASTROPATHY: ICD-10-CM

## 2022-08-26 LAB
ALBUMIN SERPL-MCNC: 4 G/DL (ref 3.5–5.2)
ALBUMIN/GLOB SERPL: 1.2 G/DL
ALP SERPL-CCNC: 90 U/L (ref 39–117)
ALPHA-FETOPROTEIN: 6.16 NG/ML (ref 0–8.3)
ALT SERPL W P-5'-P-CCNC: 35 U/L (ref 1–33)
ANION GAP SERPL CALCULATED.3IONS-SCNC: 11.2 MMOL/L (ref 5–15)
AST SERPL-CCNC: 41 U/L (ref 1–32)
BASOPHILS # BLD AUTO: 0.04 10*3/MM3 (ref 0–0.2)
BASOPHILS NFR BLD AUTO: 0.7 % (ref 0–1.5)
BILIRUB SERPL-MCNC: 0.9 MG/DL (ref 0–1.2)
BUN SERPL-MCNC: 11 MG/DL (ref 6–20)
BUN/CREAT SERPL: 10.6 (ref 7–25)
CALCIUM SPEC-SCNC: 9.2 MG/DL (ref 8.6–10.5)
CHLORIDE SERPL-SCNC: 104 MMOL/L (ref 98–107)
CO2 SERPL-SCNC: 26.8 MMOL/L (ref 22–29)
CREAT SERPL-MCNC: 1.04 MG/DL (ref 0.57–1)
DEPRECATED RDW RBC AUTO: 44 FL (ref 37–54)
EGFRCR SERPLBLD CKD-EPI 2021: 65.2 ML/MIN/1.73
EOSINOPHIL # BLD AUTO: 0.06 10*3/MM3 (ref 0–0.4)
EOSINOPHIL NFR BLD AUTO: 1 % (ref 0.3–6.2)
ERYTHROCYTE [DISTWIDTH] IN BLOOD BY AUTOMATED COUNT: 13.1 % (ref 12.3–15.4)
GLOBULIN UR ELPH-MCNC: 3.4 GM/DL
GLUCOSE SERPL-MCNC: 122 MG/DL (ref 65–99)
HCT VFR BLD AUTO: 34.9 % (ref 34–46.6)
HGB BLD-MCNC: 11.7 G/DL (ref 12–15.9)
HIV1+2 AB SER QL: NORMAL
IMM GRANULOCYTES # BLD AUTO: 0.02 10*3/MM3 (ref 0–0.05)
IMM GRANULOCYTES NFR BLD AUTO: 0.3 % (ref 0–0.5)
INR PPP: 1.11 (ref 0.84–1.13)
LYMPHOCYTES # BLD AUTO: 1.8 10*3/MM3 (ref 0.7–3.1)
LYMPHOCYTES NFR BLD AUTO: 31.4 % (ref 19.6–45.3)
MCH RBC QN AUTO: 30.8 PG (ref 26.6–33)
MCHC RBC AUTO-ENTMCNC: 33.5 G/DL (ref 31.5–35.7)
MCV RBC AUTO: 91.8 FL (ref 79–97)
MONOCYTES # BLD AUTO: 0.37 10*3/MM3 (ref 0.1–0.9)
MONOCYTES NFR BLD AUTO: 6.5 % (ref 5–12)
NEUTROPHILS NFR BLD AUTO: 3.44 10*3/MM3 (ref 1.7–7)
NEUTROPHILS NFR BLD AUTO: 60.1 % (ref 42.7–76)
NRBC BLD AUTO-RTO: 0 /100 WBC (ref 0–0.2)
PLATELET # BLD AUTO: 130 10*3/MM3 (ref 140–450)
PMV BLD AUTO: 11.4 FL (ref 6–12)
POTASSIUM SERPL-SCNC: 3.7 MMOL/L (ref 3.5–5.2)
PROT SERPL-MCNC: 7.4 G/DL (ref 6–8.5)
PROTHROMBIN TIME: 14.2 SECONDS (ref 11.4–14.4)
RBC # BLD AUTO: 3.8 10*6/MM3 (ref 3.77–5.28)
SODIUM SERPL-SCNC: 142 MMOL/L (ref 136–145)
WBC NRBC COR # BLD: 5.73 10*3/MM3 (ref 3.4–10.8)

## 2022-08-26 PROCEDURE — 87902 NFCT AGT GNTYP ALYS HEP C: CPT

## 2022-08-26 PROCEDURE — 82652 VIT D 1 25-DIHYDROXY: CPT

## 2022-08-26 PROCEDURE — 80053 COMPREHEN METABOLIC PANEL: CPT

## 2022-08-26 PROCEDURE — 85610 PROTHROMBIN TIME: CPT

## 2022-08-26 PROCEDURE — 87522 HEPATITIS C REVRS TRNSCRPJ: CPT

## 2022-08-26 PROCEDURE — G0432 EIA HIV-1/HIV-2 SCREEN: HCPCS

## 2022-08-26 PROCEDURE — 99214 OFFICE O/P EST MOD 30 MIN: CPT | Performed by: NURSE PRACTITIONER

## 2022-08-26 PROCEDURE — 85025 COMPLETE CBC W/AUTO DIFF WBC: CPT

## 2022-08-26 PROCEDURE — 82105 ALPHA-FETOPROTEIN SERUM: CPT

## 2022-08-26 RX ORDER — SOD SULF/POT CHLORIDE/MAG SULF 1.479 G
12 TABLET ORAL ONCE
Qty: 24 TABLET | Refills: 0 | Status: SHIPPED | OUTPATIENT
Start: 2022-08-26 | End: 2022-08-26

## 2022-08-26 RX ORDER — METFORMIN HYDROCHLORIDE 500 MG/1
TABLET, EXTENDED RELEASE ORAL
COMMUNITY
Start: 2022-08-10 | End: 2022-08-26

## 2022-08-26 NOTE — PROGRESS NOTES
GASTROENTEROLOGY OFFICE NOTE    Luz Elena Ballard  [id number]  1970    CARE TEAM  Patient Care Team:  Toña Beckford PA-C as PCP - General (Family Medicine)    Referring Provider: No ref. provider found    Chief Complaint   Patient presents with   • Follow-up        HISTORY OF PRESENT ILLNESS:   Luz Elena Ballard is a 52 y.o. 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     Her last office visit was 7 months ago (1/19/2022) regarding treatment naïve hepatitis C, abdominal distention, abdominal pain, nausea and diarrhea.  She is immune to hepatitis A and B.  She has genotype 1a hepatitis C, quant 3.97 million F2 by fibrosure (but concern that she likely has cirrhosis due to low platelet count and abnormal Ultrasound).  At last office visit labs were ordered due to chronic hepatitis C, ultrasound ordered due to concern for possible ascites, cirrhosis based off prior imaging and chronic hepatitis C, recommendation to limit salt intake to no more than 2 g/day, recommended fiber supplement due to report of diarrhea (she does not believe she took fiber supplement), EGD and colonoscopy recommended for additional evaluation of symptoms as well as due to possible cirrhosis and GI stool panel and C. difficile test ordered.  Lansoprazole 30 mg once daily prescribed due to heartburn.  Epclusa was approved for treatment of chronic hepatitis C but the patient informed the office and the pharmacy she did not want to take medication to treat hepatitis C when it was approved.      1/28/2022 EGD due to nausea, heartburn, diarrhea and suspected cirrhosis revealed grade 3 varices in the lower third of the esophagus, medium in size, mild portal hypertensive gastropathy in the gastric fundus.  Biopsy of the duodenum revealed chronic peptic duodenitis and focally suggestive of acute duodenitis, biopsy of the stomach revealed mild chronic gastritis  and was negative for H. pylori.     She was prescribed carvedilol for treatment which she is taking.  She does report there was a few months when she was incarcerated and in rehab that she did not take carvedilol.  Review of record revealed heart rate likely decreased from low 80s to low 70s with use of carvedilol.     2/1/2022 c. diff test negative    Approximately 7 weeks ago, on 7/5/2022 I received communication from her daughter that patient was incarcerated and then sent to rehab with her daughter expressing concern about medical care while incarcerated and in rehab.      She currently denies drug abuse and does not drink alcohol.  She would like to proceed with treatment of  hepatitis C.  She and her mother asked questions about cirrhosis which I attempted to answer during the visit today.  She would like to schedule colonoscopy.  She denies abdominal swelling and abdominal pain at this time.  She reports she is eating healthier and is no longer on treatment for diabetes due to dietary changes. She is trying to eat low salt diet.  Overall, she feels well and has more energy.  She is living with her mother.     She does report she may have to return to skilled nursing, decision should be made within the next week.      She continues to have frequent bowel movements reports 5-6 bowel movements per day.  She is taking diclofenac twice daily.    Past Medical History:   Diagnosis Date   • Arthritis     Hx of.   • Basal cell carcinoma     Hx of.   • Cellulitis     Hx of. Resolved 2/15/2016.   • Cellulitis of finger     History of Cellulitis Fingers Right Middle Finger. Resolved 2/15/2016   • Chronic hepatitis C (HCC)    • Cirrhosis (HCC)    • Depression     Hx of.   • Fatty liver    • History of Blocked tear duct     Resolved 2/15/2016   • History of migraine headaches    • Hypercholesterolemia     Hx of.   • Infectious viral hepatitis     HEP C   • IV drug abuse (HCC)     Hx of. Resolved 2/12/2016.   • Obstructive sleep apnea      Hx of.   • Sciatica     Hx of. Resolved. 2/15/2016. Resolved. 7/21/2015.   • Sciatica    • Skin cancer     Hx of.   • Upper respiratory infection     Hx of. 2/15/2016        Past Surgical History:   Procedure Laterality Date   • BREAST SURGERY  2000    Reconstruction   • CHOLECYSTECTOMY  2000   • HYSTERECTOMY  08/06/2003   • OTHER SURGICAL HISTORY      Tubal Ligation   • REDUCTION MAMMAPLASTY Bilateral 2002   • TUBAL ABDOMINAL LIGATION  1992        Current Outpatient Medications on File Prior to Visit   Medication Sig   • albuterol sulfate  (90 Base) MCG/ACT inhaler Inhale 2 puffs Every 4 (Four) Hours As Needed for Wheezing.   • azelastine (OPTIVAR) 0.05 % ophthalmic solution Administer 1 drop to both eyes Daily. As needed for allergic eye sx   • carvedilol (Coreg) 3.125 MG tablet Take 1 tablet by mouth 2 (Two) Times a Day With Meals.   • diclofenac (VOLTAREN) 75 MG EC tablet Take 1 tablet by mouth 2 (Two) Times a Day.   • DULoxetine (CYMBALTA) 30 MG capsule TAKE ONE DAILY FOR ANXIETY AND DEPRESSION   • glucose blood test strip Check blood sugar twice daily   • glucose monitor monitoring kit 1 each 2 (two) times a day. Check blood sugar twice daily   • lansoprazole (PREVACID) 30 MG capsule Take 1 capsule by mouth Daily. 30 minutes prior to a meal   • lisinopril (PRINIVIL,ZESTRIL) 20 MG tablet Take 1 tablet by mouth Daily. For BP   • ondansetron ODT (ZOFRAN-ODT) 8 MG disintegrating tablet Place 1 tablet on the tongue Every 8 (Eight) Hours As Needed for Nausea or Vomiting.   • Spacer/Aero-Holding Chambers ([place] Choice Holding Chamber) device USE AS DIRECTED WITH ALBUTEROL INHALER   • tiZANidine (ZANAFLEX) 4 MG tablet Take 1 tablet by mouth Every 8 (Eight) Hours As Needed for Muscle Spasms.   • lamoTRIgine (LaMICtal) 25 MG tablet Take 1 tablet by mouth 2 (Two) Times a Day. For mood stabilizer   • [DISCONTINUED] metFORMIN ER (GLUCOPHAGE-XR) 500 MG 24 hr tablet TAKE 1 TABLET BY MOUTH EVERY EVENING BEFORE  "SUPER     No current facility-administered medications on file prior to visit.       Allergies   Allergen Reactions   • Keflex [Cephalexin] GI Intolerance   • Sulfa Antibiotics      Sulfa drugs   • Vancomycin Rash       Family History   Problem Relation Age of Onset   • Hypertension Mother    • Arthritis Mother    • Depression Mother    • Anxiety disorder Father    • Suicidality Father    • Depression Sister    • Diabetes Maternal Grandmother    • Breast cancer Neg Hx    • Ovarian cancer Neg Hx    • Colon cancer Neg Hx        Social History     Socioeconomic History   • Marital status:    Tobacco Use   • Smoking status: Former Smoker   • Smokeless tobacco: Never Used   Vaping Use   • Vaping Use: Never used   Substance and Sexual Activity   • Alcohol use: No   • Drug use: Not Currently     Types: IV, Heroin   • Sexual activity: Yes     Partners: Male       PHYSICAL EXAM   /88 (BP Location: Left arm, Patient Position: Sitting, Cuff Size: Adult)   Pulse 72   Temp 97.5 °F (36.4 °C) (Infrared)   Ht 157.5 cm (62\")   Wt 66.3 kg (146 lb 3.2 oz)   SpO2 98%   BMI 26.74 kg/m²   Physical Exam  Constitutional:       General: She is not in acute distress.     Appearance: She is not toxic-appearing.   HENT:      Head: Normocephalic and atraumatic. No contusion.      Right Ear: External ear normal.      Left Ear: External ear normal.   Eyes:      General: Lids are normal. No scleral icterus.        Right eye: No discharge.         Left eye: No discharge.      Extraocular Movements: Extraocular movements intact.   Neck:      Trachea: Trachea normal.      Comments: No visible mass  No visible adenopathy  Cardiovascular:      Rate and Rhythm: Normal rate.   Pulmonary:      Effort: No respiratory distress.      Comments: Symmetrical expansion    Abdominal:      Palpations: Abdomen is soft. There is no mass.      Tenderness: There is no abdominal tenderness.   Musculoskeletal:      Right lower leg: No edema.      " Left lower leg: No edema.      Comments: Symmetrical movement of upper extremities  Symmetrical movement of lower extremities  No visible deformities   Skin:     General: Skin is warm and dry.      Coloration: Skin is not jaundiced.   Neurological:      General: No focal deficit present.      Mental Status: She is alert and oriented to person, place, and time.   Psychiatric:         Mood and Affect: Mood normal.         Behavior: Behavior normal.         Thought Content: Thought content normal.     Results Review:  1/20/2022 ultrasound of the abdomen revealed coarsened echotexture and mildly and diffusely nodular liver capsule, concerning for cirrhosis, common bile duct is within normal limits for previous cholecystectomy at 5 mm, splenomegaly and trace amount of ascites adjacent to the spleen  1/28/2022 EGD due to nausea, heartburn, diarrhea and suspected cirrhosis revealed grade 3 varices in the lower third of the esophagus, medium in size, mild portal hypertensive gastropathy in the gastric fundus.  Biopsy of the duodenum revealed chronic peptic duodenitis and focally suggestive of acute duodenitis, biopsy of the stomach revealed mild chronic gastritis and was negative for H. pylori.   CMP    CMP 1/19/22   Glucose 124 (A)   BUN 8   Creatinine 0.69   eGFR Non African Am 90   Sodium 140   Potassium 3.7   Chloride 106   Calcium 8.9   Albumin 3.50   Total Bilirubin 0.5   Alkaline Phosphatase 121 (A)   AST (SGOT) 32   ALT (SGPT) 31   (A) Abnormal value            CBC    CBC 1/19/22 1/19/22    1407 1409   WBC 6.09 6.09   RBC 3.77 3.77   Hemoglobin 11.4 (A) 11.4 (A)   Hematocrit 34.4 34.4   MCV 91.2 91.2   MCH 30.2 30.2   MCHC 33.1 33.1   RDW 13.5 13.5   Platelets 128 (A) 128 (A)   (A) Abnormal value            INR    Common Labsle 1/19/22   INR 1.07             ASSESSMENT / PLAN  1. Other cirrhosis of liver (HCC)  - attempt to treat hepatitis C, Epclusa previously approved by insurance but she did not want to proceed  with treatment at that time and did not take Epclusa  - send for labs as below, if found to have chronic hepatitis C, plan to prescribe Epclusa for treatment due to cirrhosis  MELD Na 7 calculated from labs completed on 1/19/2022  US q 6 months for HCC screening  Due now, US ordered  Ascites: 1/20/2022 US abdomen  trace amount of ascites adjacent to the spleen, recommend low salt diet and repeat US  No Mass on prior imaging, repeat US  No overt HE noted   EGD to screen for varices performed 1/28/2022 with medium sized varices, carvedilol BID prescribed which she reports compliance with taking, repeat EGD if evidence of hemorrhage and/or decompensation  CRC screen due, scheduled today  Hepatitis A and B immune  Recommend daily protein intake of 80 grams  Limit salt/sodium intake to no more than 2 grams or 2,000 mg per day.   Avoid NSAIDs such as ibuprofen, Advil, Motrin, diclofenac, meloxicam, naproxen. Limit use of acetaminophen to no more than 2,000 mg per day (patient may take acetaminophen 500 mg q 6 hours as needed for pain).  Avoid alcohol, tobacco and raw shellfish  Will check vitamin D level to evaluate for deficiency and recommend replacement of vitamin D and calcium if low  - AFP Tumor Marker; Future  - CBC & Differential; Future  - Comprehensive Metabolic Panel; Future  - Protime-INR; Future  - US Liver; Future  - Vitamin D 1,25 Dihydroxy; Future    2. Chronic hepatitis C without hepatic coma (HCC)  - - 2018 labs revealed genotype 1a, VL 11 million, F2 by  fibrosure  1/19/2022 labs revealed genotype 1a hepatitis C, quant 3.97 million F2 by fibrosure (but she has cirrhosis based off thrombocytopenia, esophageal varices, portal hypertensive gastropathy, nodular appearance of liver on imaging)  -Plan to prescribe Epclusa for treatment but I will need to review drug interactions prior to starting Epclusa  -She is immune to hepatitis a and B  - Recommend patient avoid sharing personal  items such as nail  clippers, razor blades, toothbrushes, tweezers; recommend barrier protection if sexually active  - HCV RNA By PCR, Qn Rfx Annita; Future  - HIV-1 & HIV-2 Antibodies; Future    3. Screen for colon cancer  - colonoscopy due to diarrhea scheduled  - Ambulatory Referral For Screening Colonoscopy    4. Diarrhea, unspecified type  -Consider daily dose of psyllium as previously prescribed/recommended, prior report of diarrhea with negative C. difficile test  -Plan for colonoscopy for additional evaluation  -Recommend she avoid diclofenac due to cirrhosis but also due to diclofenac having high likelihood of triggering microscopic colitis  -She also takes duloxetine which has intermediately likelihood of triggering microscopic colitis    5. Immune to hepatitis A  6. Immune to hepatitis B    7. Secondary esophageal varices without bleeding (HCC)  -Continue carvedilol twice daily  - Patients with medium or large varices who are receiving nonselective beta blockers do not require follow-up endoscopy unless there is evidence of hemorrhage (check CBC as above and monitor stool for dark or bright red blood) or possibly if decompensation  8. Portal hypertensive gastropathy (HCC)  - due to cirrhosis, identified during EGD 1/28/2022    9. Elevated alkaline phosphatase level  -  evaluate for chronic HCV with labs as above, monitor CMP     -She has history of heartburn for which Prevacid was previously prescribed.  Return for Follow-up after Colonoscopy.    Ashley Mccormick, GAGE  08/26/2022

## 2022-08-30 LAB — 1,25(OH)2D SERPL-MCNC: 54.8 PG/ML (ref 24.8–81.5)

## 2022-09-02 LAB
HCV GENTYP SERPL NAA+PROBE: NORMAL
HCV GENTYP SERPL NAA+PROBE: NORMAL
HCV RNA SERPL NAA+PROBE-ACNC: NORMAL IU/ML
HCV RNA SERPL NAA+PROBE-LOG IU: 6.25 LOG10 IU/ML
LABORATORY COMMENT REPORT: NORMAL
LABORATORY COMMENT REPORT: NORMAL

## 2022-09-09 ENCOUNTER — OFFICE VISIT (OUTPATIENT)
Dept: FAMILY MEDICINE CLINIC | Facility: CLINIC | Age: 52
End: 2022-09-09

## 2022-09-09 VITALS
TEMPERATURE: 97.2 F | OXYGEN SATURATION: 99 % | DIASTOLIC BLOOD PRESSURE: 82 MMHG | BODY MASS INDEX: 26.13 KG/M2 | SYSTOLIC BLOOD PRESSURE: 138 MMHG | HEART RATE: 59 BPM | WEIGHT: 142 LBS | HEIGHT: 62 IN

## 2022-09-09 DIAGNOSIS — F41.1 GAD (GENERALIZED ANXIETY DISORDER): Primary | ICD-10-CM

## 2022-09-09 DIAGNOSIS — Z12.31 BREAST CANCER SCREENING BY MAMMOGRAM: ICD-10-CM

## 2022-09-09 DIAGNOSIS — B00.9 HERPES: ICD-10-CM

## 2022-09-09 PROCEDURE — 99213 OFFICE O/P EST LOW 20 MIN: CPT | Performed by: PHYSICIAN ASSISTANT

## 2022-09-09 RX ORDER — TOPIRAMATE 25 MG/1
25 TABLET ORAL 2 TIMES DAILY
Qty: 60 TABLET | Refills: 5 | Status: SHIPPED | OUTPATIENT
Start: 2022-09-09

## 2022-09-09 RX ORDER — VALACYCLOVIR HYDROCHLORIDE 500 MG/1
500 TABLET, FILM COATED ORAL 2 TIMES DAILY
Qty: 30 TABLET | Refills: 5 | Status: SHIPPED | OUTPATIENT
Start: 2022-09-09 | End: 2023-03-08

## 2022-09-09 RX ORDER — DULOXETIN HYDROCHLORIDE 60 MG/1
60 CAPSULE, DELAYED RELEASE ORAL DAILY
Qty: 30 CAPSULE | Refills: 5 | Status: SHIPPED | OUTPATIENT
Start: 2022-09-09

## 2022-09-09 NOTE — PROGRESS NOTES
Subjective   Luz Elena Ballard is a 52 y.o. female  Anxiety (Follow up on anxiety, stopped lamictal due to diarrhea, req replacement med and possible increase in cymbalta ) and Herpes Zoster (Req medication for herpes flare)      History of Present Illness    Luz Elena Ballard, date of birth 1970, presents today to talk about uncontrolled anxiety. The patient states that she is no longer taking Lamictal due to it causing her to have diarrhea. She notes that the diarrhea resolved once she discontinued the Lamictal. The patient states that she would like to increase her Cymbalta to 90 mg daily. She notes that she has tried counseling in the past, but has not been able to attend due to insurance issues. The patient states that she has a follow-up appointment with her hepatologist next week. She notes that she still has hepatitis C and they are discussing treatment options. The patient states that she is taking a multivitamin with vitamin D and Calcium in it. She notes that she missed her colonoscopy and it was rescheduled.    The following portions of the patient's history were reviewed and updated as appropriate: allergies, current medications, past social history and problem list    Review of Systems   Constitutional: Negative for appetite change and fatigue.   Respiratory: Negative for chest tightness and shortness of breath.    Gastrointestinal: Negative for abdominal pain, diarrhea and nausea.   Allergic/Immunologic: Positive for immunocompromised state.   Neurological: Negative for dizziness, tremors, weakness, light-headedness and headaches.   Psychiatric/Behavioral: Positive for dysphoric mood. Negative for agitation, behavioral problems, confusion, decreased concentration, hallucinations, self-injury, sleep disturbance and suicidal ideas. The patient is nervous/anxious. The patient is not hyperactive.        Objective     Vitals:    09/09/22 1351   BP: 138/82   Pulse: 59   Temp: 97.2 °F (36.2 °C)   SpO2: 99%        Physical Exam  Vitals and nursing note reviewed.   Constitutional:       General: She is not in acute distress.     Appearance: Normal appearance. She is well-developed. She is not ill-appearing, toxic-appearing or diaphoretic.   HENT:      Head: Normocephalic and atraumatic.   Neck:      Thyroid: No thyroid mass or thyromegaly.   Cardiovascular:      Rate and Rhythm: Normal rate and regular rhythm.      Heart sounds: Normal heart sounds.   Pulmonary:      Effort: Pulmonary effort is normal. No respiratory distress.   Skin:     General: Skin is warm and dry.      Coloration: Skin is not jaundiced.   Neurological:      Mental Status: She is alert and oriented to person, place, and time.   Psychiatric:         Attention and Perception: Attention and perception normal. She is attentive.         Mood and Affect: Mood is anxious. Mood is not depressed. Affect is not angry or inappropriate.         Speech: Speech normal.         Behavior: Behavior normal.         Thought Content: Thought content normal.         Cognition and Memory: Cognition normal.         Judgment: Judgment normal.         Assessment & Plan     Diagnoses and all orders for this visit:    1. EFREM (generalized anxiety disorder) (Primary)    2. Breast cancer screening by mammogram  -     Mammo Screening Digital Tomosynthesis Bilateral With CAD; Future    3. Herpes    Other orders  -     topiramate (Topamax) 25 MG tablet; Take 1 tablet by mouth 2 (Two) Times a Day.  Dispense: 60 tablet; Refill: 5  -     DULoxetine (Cymbalta) 60 MG capsule; Take 1 capsule by mouth Daily. New dose  Dispense: 30 capsule; Refill: 5  -     valACYclovir (Valtrex) 500 MG tablet; Take 1 tablet by mouth 2 (Two) Times a Day. As needed for herpes flare  Dispense: 30 tablet; Refill: 5    The patient will increase her Cymbalta to 60 mg daily. She will start Topamax 25 mg twice a day. After a week, she can increase the dose if needed. If after a week she is feeling like this is not  stabilizing, she will contact the office to raise it to 50 mg twice a day. She is due for a mammogram, so an order will be put in for them to call her to schedule that at her convenience.       Answers for HPI/ROS submitted by the patient on 9/9/2022  Please describe your symptoms.: follow ipon meds  Have you had these symptoms before?: Yes  How long have you been having these symptoms?: Greater than 2 weeks  Please list any medications you are currently taking for this condition.: med list is current  What is the primary reason for your visit?: Other    Transcribed from ambient dictation for Toña Beckford PA-C by TREVOR LIEBERMAN.  09/09/22   15:26 EDT    Patient verbalized consent to the visit recording.  I have personally performed the services described in this document as transcribed by the above individual, and it is both accurate and complete.  Toña Beckford PA-C  9/9/2022  16:26 EDT

## 2022-09-16 ENCOUNTER — TELEPHONE (OUTPATIENT)
Dept: GASTROENTEROLOGY | Facility: CLINIC | Age: 52
End: 2022-09-16

## 2022-09-16 NOTE — TELEPHONE ENCOUNTER
I called [facility] and informed them Ashley ALMONTE would like all patients Hepatitis C medication delivered to our office. Representative stated she has put this in alerts.

## 2022-10-04 ENCOUNTER — TELEPHONE (OUTPATIENT)
Dept: GASTROENTEROLOGY | Facility: CLINIC | Age: 52
End: 2022-10-04

## 2022-10-04 ENCOUNTER — DOCUMENTATION (OUTPATIENT)
Dept: GASTROENTEROLOGY | Facility: CLINIC | Age: 52
End: 2022-10-04

## 2022-10-04 NOTE — TELEPHONE ENCOUNTER
Received communication via fax from Rickey regarding patient's Epclusa prescription. Scanned into media folder. Please review and advise. Thanks!

## 2022-10-05 NOTE — PROGRESS NOTES
The office received communication on 10/3/2022 from [facility] pharmacy that [facility] spoke to patient's mom Jazmine and reported patient is in custodial for 6 months. Request from the pharmacy to let them know how to proceed.     To melly: Please call her mother to determine which custodial she is in. Then, try to call the custodial to determine if patient can be treated while incarcerated with coming to the office for appointments every 3 to 4 weeks x 3 months then every 3 months thereafter. And try to discuss how the patient would get refill of medication while in custodial because the patient usually has to call for refills (maybe they have experience with treating hepatitis C in custodial?). But if it seems better to wait until she is released, then, recommend placing the prescription on hold until she is released.     Thanks!

## 2022-10-06 ENCOUNTER — TELEPHONE (OUTPATIENT)
Dept: GASTROENTEROLOGY | Facility: CLINIC | Age: 52
End: 2022-10-06

## 2022-10-06 NOTE — TELEPHONE ENCOUNTER
I called [facility] [phone] and spoke with Jane. She informed me that while she is incarcerated her insurance is inactive. I also reached out to [facility] and they informed me the medication for the Hepatitis C would have to be put on hold.

## 2022-10-06 NOTE — TELEPHONE ENCOUNTER
Spoke to [facility] today, patient does not have active insurance until she is released so they recommend putting the medication on hold until then.

## 2023-03-07 ENCOUNTER — TELEPHONE (OUTPATIENT)
Dept: GASTROENTEROLOGY | Facility: CLINIC | Age: 53
End: 2023-03-07
Payer: COMMERCIAL

## 2023-03-07 NOTE — TELEPHONE ENCOUNTER
Patient left message stating she would like to schedule appointment to start hep C meds we had on hold for her while she was incarcerated; also mentioned liver biopsy. Called patient back, no answer, LVM.

## 2023-03-08 ENCOUNTER — OFFICE VISIT (OUTPATIENT)
Dept: FAMILY MEDICINE CLINIC | Facility: CLINIC | Age: 53
End: 2023-03-08
Payer: COMMERCIAL

## 2023-03-08 ENCOUNTER — OFFICE VISIT (OUTPATIENT)
Dept: GASTROENTEROLOGY | Facility: CLINIC | Age: 53
End: 2023-03-08
Payer: COMMERCIAL

## 2023-03-08 VITALS
DIASTOLIC BLOOD PRESSURE: 94 MMHG | OXYGEN SATURATION: 98 % | HEIGHT: 62 IN | WEIGHT: 141 LBS | BODY MASS INDEX: 25.95 KG/M2 | TEMPERATURE: 96.9 F | SYSTOLIC BLOOD PRESSURE: 140 MMHG | HEART RATE: 76 BPM

## 2023-03-08 VITALS
TEMPERATURE: 97.2 F | HEIGHT: 62 IN | WEIGHT: 141.2 LBS | HEART RATE: 94 BPM | DIASTOLIC BLOOD PRESSURE: 86 MMHG | SYSTOLIC BLOOD PRESSURE: 158 MMHG | BODY MASS INDEX: 25.98 KG/M2 | OXYGEN SATURATION: 99 %

## 2023-03-08 DIAGNOSIS — Z12.31 BREAST CANCER SCREENING BY MAMMOGRAM: ICD-10-CM

## 2023-03-08 DIAGNOSIS — R11.0 CHRONIC NAUSEA: ICD-10-CM

## 2023-03-08 DIAGNOSIS — F39 MOOD DISORDER: ICD-10-CM

## 2023-03-08 DIAGNOSIS — Z51.81 ENCOUNTER FOR THERAPEUTIC DRUG MONITORING: ICD-10-CM

## 2023-03-08 DIAGNOSIS — E11.65 TYPE 2 DIABETES MELLITUS WITH HYPERGLYCEMIA, WITHOUT LONG-TERM CURRENT USE OF INSULIN: Primary | ICD-10-CM

## 2023-03-08 DIAGNOSIS — K74.69 OTHER CIRRHOSIS OF LIVER: Primary | ICD-10-CM

## 2023-03-08 DIAGNOSIS — Z78.9 IMMUNE TO HEPATITIS A: ICD-10-CM

## 2023-03-08 DIAGNOSIS — B18.2 CHRONIC HEPATITIS C WITHOUT HEPATIC COMA: ICD-10-CM

## 2023-03-08 DIAGNOSIS — F41.9 ANXIETY DISORDER, UNSPECIFIED TYPE: ICD-10-CM

## 2023-03-08 DIAGNOSIS — R74.8 ELEVATED LIVER ENZYMES: ICD-10-CM

## 2023-03-08 DIAGNOSIS — Z78.9 IMMUNE TO HEPATITIS B: ICD-10-CM

## 2023-03-08 DIAGNOSIS — G47.09 OTHER INSOMNIA: ICD-10-CM

## 2023-03-08 DIAGNOSIS — I85.10 SECONDARY ESOPHAGEAL VARICES WITHOUT BLEEDING: ICD-10-CM

## 2023-03-08 DIAGNOSIS — K76.6 PORTAL HYPERTENSIVE GASTROPATHY: ICD-10-CM

## 2023-03-08 DIAGNOSIS — R19.7 DIARRHEA, UNSPECIFIED TYPE: ICD-10-CM

## 2023-03-08 DIAGNOSIS — K31.89 PORTAL HYPERTENSIVE GASTROPATHY: ICD-10-CM

## 2023-03-08 DIAGNOSIS — Z12.11 SCREEN FOR COLON CANCER: ICD-10-CM

## 2023-03-08 LAB
EXPIRATION DATE: NORMAL
GLUCOSE BLDC GLUCOMTR-MCNC: 108 MG/DL (ref 70–130)
HBA1C MFR BLD: 6 %
Lab: NORMAL

## 2023-03-08 PROCEDURE — 99214 OFFICE O/P EST MOD 30 MIN: CPT | Performed by: NURSE PRACTITIONER

## 2023-03-08 PROCEDURE — 3077F SYST BP >= 140 MM HG: CPT | Performed by: PHYSICIAN ASSISTANT

## 2023-03-08 PROCEDURE — 3080F DIAST BP >= 90 MM HG: CPT | Performed by: NURSE PRACTITIONER

## 2023-03-08 PROCEDURE — 3079F DIAST BP 80-89 MM HG: CPT | Performed by: PHYSICIAN ASSISTANT

## 2023-03-08 PROCEDURE — 3044F HG A1C LEVEL LT 7.0%: CPT | Performed by: PHYSICIAN ASSISTANT

## 2023-03-08 PROCEDURE — 83036 HEMOGLOBIN GLYCOSYLATED A1C: CPT | Performed by: PHYSICIAN ASSISTANT

## 2023-03-08 PROCEDURE — 1160F RVW MEDS BY RX/DR IN RCRD: CPT | Performed by: NURSE PRACTITIONER

## 2023-03-08 PROCEDURE — 1159F MED LIST DOCD IN RCRD: CPT | Performed by: NURSE PRACTITIONER

## 2023-03-08 PROCEDURE — 3077F SYST BP >= 140 MM HG: CPT | Performed by: NURSE PRACTITIONER

## 2023-03-08 PROCEDURE — 99214 OFFICE O/P EST MOD 30 MIN: CPT | Performed by: PHYSICIAN ASSISTANT

## 2023-03-08 PROCEDURE — 82962 GLUCOSE BLOOD TEST: CPT | Performed by: PHYSICIAN ASSISTANT

## 2023-03-08 RX ORDER — VELPATASVIR AND SOFOSBUVIR 100; 400 MG/1; MG/1
TABLET, FILM COATED ORAL
COMMUNITY
End: 2023-03-08

## 2023-03-08 RX ORDER — QUETIAPINE FUMARATE 25 MG/1
25 TABLET, FILM COATED ORAL NIGHTLY
Qty: 30 TABLET | Refills: 2 | Status: SHIPPED | OUTPATIENT
Start: 2023-03-08

## 2023-03-08 NOTE — PROGRESS NOTES
Subjective   Luz Elena Ballard is a 52 y.o. female  Diabetes (Follow up on diabetes )      History of Present Illness    The patient presents today for follow-up on diabetes. She is accompanied by her mother.    The patient went to rehab and chose to work on the herself and it turns out it was the best thing she could have done. She did not take medication consistently while she was in rehab. The patient did not check her blood glucose in rehab. She kept off her sugars and salt. The patient's blood pressure is elevated today. She is on lisinopril and carvedilol. She used to eat sweets and drink Mountain Dew and never ate food. The patient has not used heroin in a year and over 8 months since she has abused any substances.     She went to the gastroenterologist a while ago and she was scared to go today because she was waiting for stage IV to tell her it did not turn further than that. The patient's mother went with her daughter to see gastroenterology and they were told that the patient is stage IV and the next step would be stage V. When she saw the clinician today, she said she did not see those things. She was told she had colitis, but the clinician denied it today. The patient's mother states she has been very emotional today. The [facility] was [facility] on 03/02/2023. Her psychiatrist put her on Topamax and Cymbalta. She is not sleeping well. The patient's mother states her brain is so mangled up and she cannot concentrate long enough to work her telephone. The patient states she feels anxious and panicky. The patient still feels angry.    The following portions of the patient's history were reviewed and updated as appropriate: allergies, current medications, past social history and problem list    Review of Systems   Constitutional: Negative for appetite change and fatigue.   Respiratory: Negative for chest tightness and shortness of breath.    Gastrointestinal: Positive for abdominal distention and  diarrhea. Negative for abdominal pain and nausea.   Allergic/Immunologic: Positive for immunocompromised state.   Neurological: Negative for dizziness, tremors, weakness, light-headedness and headaches.   Psychiatric/Behavioral: Positive for sleep disturbance. Negative for agitation, behavioral problems, confusion, decreased concentration, dysphoric mood and suicidal ideas. The patient is nervous/anxious and is hyperactive.        Objective     Vitals:    03/08/23 1547   BP: 158/86   Pulse: 94   Temp: 97.2 °F (36.2 °C)   SpO2: 99%       Physical Exam  Vitals and nursing note reviewed.   Constitutional:       General: She is not in acute distress.     Appearance: Normal appearance. She is well-developed. She is not ill-appearing, toxic-appearing or diaphoretic.   HENT:      Head: Normocephalic and atraumatic.   Neck:      Thyroid: No thyroid mass or thyromegaly.   Cardiovascular:      Rate and Rhythm: Normal rate and regular rhythm.      Heart sounds: Normal heart sounds.   Pulmonary:      Effort: Pulmonary effort is normal. No respiratory distress.   Skin:     General: Skin is warm and dry.      Coloration: Skin is pale.   Neurological:      Mental Status: She is alert and oriented to person, place, and time.   Psychiatric:         Attention and Perception: Perception normal. She is inattentive.         Mood and Affect: Mood is anxious and elated. Mood is not depressed. Affect is labile. Affect is not angry or inappropriate.         Speech: Speech is tangential.         Behavior: Behavior is hyperactive.         Thought Content: Thought content normal.         Cognition and Memory: Cognition normal.         Judgment: Judgment normal.         Assessment & Plan     Type 2 diabetes mellitus without complication, without long-term current use of insulin ([facility]) (Primary)  The patient's blood glucose is well controlled at this time. She will continue her current medication regimen.    Essential hypertension  The patient's  blood pressure is well controlled at this time. She will continue her current medication regimen.    Depression, unspecified depression type  The patient will be referred to psychiatry.    Insomnia, unspecified type  The patient will be prescribed Seroquel.    Diagnoses and all orders for this visit:    1. Type 2 diabetes mellitus with hyperglycemia, without long-term current use of insulin (HCC) (Primary)  -     POC Glycosylated Hemoglobin (Hb A1C)  -     POCT Glucose    2. Mood disorder (HCC)  -     Ambulatory Referral to Psychiatry  -     Ambulatory Referral to Behavioral Health    3. Anxiety disorder, unspecified type  -     Ambulatory Referral to Psychiatry  -     Ambulatory Referral to Behavioral Health    4. Encounter for therapeutic drug monitoring  -     Urine Drug Screen - Urine, Clean Catch; Future    5. Other insomnia    Other orders  -     QUEtiapine (SEROquel) 25 MG tablet; Take 1 tablet by mouth Every Night.  Dispense: 30 tablet; Refill: 2     Transcribed from ambient dictation for Toña Beckford PA-C by Belinda Dao.  03/08/23   16:59 EST    Patient or patient representative verbalized consent to the visit recording.  I have personally performed the services described in this document as transcribed by the above individual, and it is both accurate and complete.  Toña Beckford PA-C  3/9/2023  16:48 EST  I spent 25 minutes in patient care: Reviewing records prior to the visit, examining the patient, entering orders and documentation    Part of this note may be an electronic transcription/translation of spoken language to printed text using the Dragon Dictation System.

## 2023-03-08 NOTE — PROGRESS NOTES
"GASTROENTEROLOGY OFFICE NOTE    Luz Elena Ballard  [id number]  1970    CARE TEAM  Patient Care Team:  Toña Beckford PA-C as PCP - General (Family Medicine)    Referring Provider: No ref. provider found    Chief Complaint   Patient presents with   • Hepatitis C   • Med Management        HISTORY OF PRESENT ILLNESS:   Luz Elena Ballard is a 52 y.o. 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 She is treatment naïve.     Her last office visit was approximately 6 months ago(8/26/2022) she then went to rehab and was incarcerated until recently. She is again accompanied by her mother who provides some history and ask some questions.     She complains of frequent bowel movements with fecal incontinence reporting up to 10 bowel movements per day, distention of abdomen despite weight loss.  Her mother reports she has been crying frequently.  She also complains of nasal drainage and chest congestion.    During the appointment, she complains of issue with colitis (we previously discussed consideration for differential of microscopic colitis due to patient taking diclofenac on a regular basis, diclofenac has a high likelihood of triggering microscopic colitis as well as taking duloxetine which has an intermediate likelihood of triggering microscopic colitis but no known history of colitis). She and her mother also mentioned we previously discussed \"stage V liver disease due to liver mass\" and consideration for liver biopsy, I attempted to clarify during the appointment.    She reports she is adhering to low-salt diet. She reports abdominal swelling.    The appointment today was scheduled to start Epclusa.  Review of record reveals documentation 5 months ago with request from our " office to the specialty [facility] to hold the prescription due to patient being incarcerated and not having insurance, however, the office received the first month of treatment (likely in September of last year) and the patient contacted the office yesterday to schedule a visit to start hepatitis C treatment which was scheduled.  Review of record reveals that the PA for Epclusa  on 2022.     Due to rehab and incarceration she expresses concern that she has not been taking carvedilol as prescribed for varices.  She is interested in repeat EGD.  Prior review of record reveals heart rate previously in the 80s and currently and in the past has been in the mid to low 70s, possible decrease from use of carvedilol. She would also like to schedule colonoscopy as previously discussed.     Past Medical History:   Diagnosis Date   • Arthritis     Hx of.   • Basal cell carcinoma     Hx of.   • Cellulitis     Hx of. Resolved 2/15/2016.   • Cellulitis of finger     History of Cellulitis Fingers Right Middle Finger. Resolved 2/15/2016   • Chronic hepatitis C (HCC)    • Cirrhosis (HCC)    • Depression     Hx of.   • Fatty liver    • History of Blocked tear duct     Resolved 2/15/2016   • History of migraine headaches    • Hypercholesterolemia     Hx of.   • Infectious viral hepatitis     HEP C   • IV drug abuse (HCC)     Hx of. Resolved 2016.   • Obstructive sleep apnea     Hx of.   • Sciatica     Hx of. Resolved. 2/15/2016. Resolved. 2015.   • Sciatica    • Skin cancer     Hx of.   • Upper respiratory infection     Hx of. 2/15/2016        Past Surgical History:   Procedure Laterality Date   • BREAST SURGERY      Reconstruction   • CHOLECYSTECTOMY     • HYSTERECTOMY  2003   • OTHER SURGICAL HISTORY      Tubal Ligation   • REDUCTION MAMMAPLASTY Bilateral    • TUBAL ABDOMINAL LIGATION          Current Outpatient Medications on File Prior to Visit   Medication Sig   • albuterol  sulfate  (90 Base) MCG/ACT inhaler Inhale 2 puffs Every 4 (Four) Hours As Needed for Wheezing.   • azelastine (OPTIVAR) 0.05 % ophthalmic solution Administer 1 drop to both eyes Daily. As needed for allergic eye sx   • carvedilol (Coreg) 3.125 MG tablet Take 1 tablet by mouth 2 (Two) Times a Day With Meals.   • diclofenac (VOLTAREN) 75 MG EC tablet Take 1 tablet by mouth 2 (Two) Times a Day.   • DULoxetine (Cymbalta) 60 MG capsule Take 1 capsule by mouth Daily. New dose   • lansoprazole (PREVACID) 30 MG capsule Take 1 capsule by mouth Daily. 30 minutes prior to a meal   • lisinopril (PRINIVIL,ZESTRIL) 20 MG tablet Take 1 tablet by mouth Daily. For BP   • ondansetron ODT (ZOFRAN-ODT) 8 MG disintegrating tablet Place 1 tablet on the tongue Every 8 (Eight) Hours As Needed for Nausea or Vomiting.   • Spacer/Aero-Holding Chambers (Babylon Choice Holding Chamber) device USE AS DIRECTED WITH ALBUTEROL INHALER   • topiramate (Topamax) 25 MG tablet Take 1 tablet by mouth 2 (Two) Times a Day.   • QUEtiapine (SEROquel) 25 MG tablet Take 1 tablet by mouth Every Night.     No current facility-administered medications on file prior to visit.       Allergies   Allergen Reactions   • Keflex [Cephalexin] GI Intolerance   • Sulfa Antibiotics      Sulfa drugs   • Vancomycin Rash       Family History   Problem Relation Age of Onset   • Hypertension Mother    • Arthritis Mother    • Depression Mother    • Anxiety disorder Father    • Suicidality Father    • Depression Sister    • Diabetes Maternal Grandmother    • Breast cancer Neg Hx    • Ovarian cancer Neg Hx    • Colon cancer Neg Hx        Social History     Socioeconomic History   • Marital status:    Tobacco Use   • Smoking status: Former     Types: Cigarettes   • Smokeless tobacco: Never   Vaping Use   • Vaping Use: Never used   Substance and Sexual Activity   • Alcohol use: No   • Drug use: Not Currently     Types: IV, Heroin   • Sexual activity: Yes     Partners:  "Male       PHYSICAL EXAM   /94 (BP Location: Left arm, Patient Position: Sitting, Cuff Size: Adult)   Pulse 76   Temp 96.9 °F (36.1 °C) (Infrared)   Ht 157.5 cm (62\")   Wt 64 kg (141 lb)   SpO2 98%   BMI 25.79 kg/m²   Physical Exam  Constitutional:       General: She is not in acute distress.     Appearance: She is not toxic-appearing.   HENT:      Head: Normocephalic and atraumatic. No contusion.      Right Ear: External ear normal.      Left Ear: External ear normal.   Eyes:      General: Lids are normal. No scleral icterus.        Right eye: No discharge.         Left eye: No discharge.      Extraocular Movements: Extraocular movements intact.   Neck:      Trachea: Trachea normal.      Comments: No visible mass  No visible adenopathy  Cardiovascular:      Rate and Rhythm: Normal rate.      Heart sounds: Normal heart sounds.   Pulmonary:      Effort: No respiratory distress.      Breath sounds: Normal breath sounds.      Comments: Symmetrical expansion    Abdominal:      General: There is distension.      Palpations: Abdomen is soft. There is no mass.      Tenderness: There is no abdominal tenderness.   Musculoskeletal:      Comments: Symmetrical movement of upper extremities  Symmetrical movement of lower extremities  No visible deformities   Skin:     General: Skin is warm and dry.      Coloration: Skin is not jaundiced.   Neurological:      General: No focal deficit present.      Mental Status: She is alert.   Psychiatric:         Mood and Affect: Mood is anxious.      Comments: Intermittently tearful     Results Review:  1/20/2022 ultrasound of the abdomen revealed coarsened echotexture and mildly and diffusely nodular liver capsule, concerning for cirrhosis, common bile duct is within normal limits for previous cholecystectomy at 5 mm, splenomegaly and trace amount of ascites adjacent to the spleen    1/28/2022 EGD due to nausea, heartburn, diarrhea and suspected cirrhosis revealed grade 3 varices " in the lower third of the esophagus, medium in size, mild portal hypertensive gastropathy in the gastric fundus.  Biopsy of the duodenum revealed chronic peptic duodenitis and focally suggestive of acute duodenitis, biopsy of the stomach revealed mild chronic gastritis and was negative for H. pylori.   CMP    CMP 22   Glucose 122 (A)   BUN 11   Creatinine 1.04 (A)   eGFR 65.2   Sodium 142   Potassium 3.7   Chloride 104   Calcium 9.2   Total Protein 7.4   Albumin 4.00   Globulin 3.4   Total Bilirubin 0.9   Alkaline Phosphatase 90   AST (SGOT) 41 (A)   ALT (SGPT) 35 (A)   Albumin/Globulin Ratio 1.2   BUN/Creatinine Ratio 10.6   Anion Gap 11.2   (A) Abnormal value       Comments are available for some flowsheets but are not being displayed.           CBC    CBC 22   WBC 5.73   RBC 3.80   Hemoglobin 11.7 (A)   Hematocrit 34.9   MCV 91.8   MCH 30.8   MCHC 33.5   RDW 13.1   Platelets 130 (A)   (A) Abnormal value            INR    Common Labsle 22   INR 1.11             ASSESSMENT / PLAN  1. Other cirrhosis of liver (HCC)  - attempt to treat hepatitis C, Epclusa previously approved by insurance but she did not want to proceed with treatment at that time and did not take Epclusa, then, Epclusa was again approved by insurance and she went to rehab and was incarcerated and PA has , see treatment plan below  MELD Na 7 calculated from labs completed on 2022-->2022 MELD Na 8  US q 6 months for HCC screening  Due now, US ordered   Ascites: 2022 US abdomen  trace amount of ascites adjacent to the spleen, recommend low salt diet and repeat US; abdominal swelling, repeat US to evaluate for ascites  No Mass on prior imaging, repeat US   No overt HE noted   EGD to screen for varices performed 2022 with medium sized varices, carvedilol BID prescribed which she expresses concern about not getting medication while in rehab and during incarceration, repeat EGD, consider increase in dose of  carvedilol if she establishes routine visits  CRC screen due, scheduled today  Hepatitis A and B immune  Previously Recommended daily protein intake of 80 grams  Limit salt/sodium intake to no more than 2 grams or 2,000 mg per day.   Avoid NSAIDs such as ibuprofen, Advil, Motrin, diclofenac, meloxicam, naproxen. Limit use of acetaminophen to no more than 2,000 mg per day (patient may take acetaminophen 500 mg q 6 hours as needed for pain).  Avoid alcohol, tobacco and raw shellfish   - vitamin D level normal 2022  -She does not need liver biopsy as there have been findings associated with cirrhosis and liver biopsy will not change treatment plan, we discussed ultrasound every 6 months to evaluate for HCC because patients with cirrhosis are at increased risk of liver cancer but prior imaging without mass and prior AFP normal.   - CBC & Differential; Future  - Comprehensive Metabolic Panel; Future  - Protime-INR; Future  - HIV-1 & HIV-2 Antibodies; Future  - HCV RT-PCR,Quant(Non-Graph); Future    2. Chronic hepatitis C without hepatic coma (HCC)    labs revealed genotype 1a, VL 11 million, F2 by  fibrosure  2022 labs revealed genotype 1a hepatitis C, quant 3.97 million F2 by fibrosure (but she has cirrhosis; thrombocytopenia, esophageal varices, portal hypertensive gastropathy, nodular appearance of liver on imaging)  - Epclusa previously approved for treatment by insurance, the office has received the first month of Epclusa, however, the PA has .  In an attempt to avoid gap in treatment of hepatitis C with Epclusa, recommend Epclusa be approved by insurance. The office was informed by the specialty [facility] that a new PA would be initiated. When new PA is approved and second month of Epclusa is received at the office, plan to start Epclusa.   - The following drug interactions have been considered:  She should avoid prevacid with epclusa, take famotidine at same time as Epclusa or 12 hours  apart, consider omeprazole 4 hours after Epclusa; caution if taking topiramate in dose of greater than 200 mg/day (medication list includes 25 mg twice daily)  -She is immune to hepatitis a and B  -  avoid sharing personal  items such as nail clippers, razor blades, toothbrushes, tweezers; recommend barrier protection if sexually active  - CBC & Differential; Future  - Comprehensive Metabolic Panel; Future  - Protime-INR; Future  - HIV-1 & HIV-2 Antibodies; Future  - HCV RT-PCR,Quant(Non-Graph); Future    3. Diarrhea, unspecified type  -We previously discussed daily dose of psyllium but unsure if she has tried this, she had negative C. difficile test but due to recent incarceration recommend repeat stool studies as below as well as fecal calprotectin for additional evaluation  - plan for colonoscopy  - consider trial of Xifaxan for possible irritable bowel syndrome with diarrhea or colestipol in the future for possible bile acid malabsorption or Imodium if stool specimens below are normal/negative  - Clostridioides difficile Toxin - Stool, Per Rectum; Future  - Gastrointestinal Panel, PCR - Stool, Per Rectum; Future  - Calprotectin, Fecal - Stool, Per Rectum; Future    4. Immune to hepatitis A  5. Immune to hepatitis B    6. Secondary esophageal varices without bleeding (HCC)  Continue carvedilol twice daily, consider increase in carvedilol if patient is compliant with follow up appointments and depending on result of repeat EGD (she expressed concern that she has not been on carvedilol consistently due to rehab and incarceration)    7. Portal hypertensive gastropathy (HCC)  - due to cirrhosis, identified during EGD 1/28/2022    8. Elevated liver enzymes  - likely due to chronic hepatitis C, plan to treat in the near future, Epclusa needs a new PA  9. Screen for colon cancer  - due to diarrhea, schedule diagnostic colonoscopy  - Ambulatory Referral For Screening Colonoscopy      Return for Follow-up after  Colonoscopy, Follow-up after EGD.    Ashley Mccormick, APRN  03/08/2023

## 2023-03-10 RX ORDER — ONDANSETRON 8 MG/1
TABLET, ORALLY DISINTEGRATING ORAL
Qty: 15 TABLET | Refills: 3 | OUTPATIENT
Start: 2023-03-10

## 2023-03-10 NOTE — TELEPHONE ENCOUNTER
Pt needs to go to lab and get all labs ordered for us to see why she is nauseated and make sure her liver is functioning properly before med can be refilled. Labs are ordered, pt can go this weekend to hospital lab

## 2023-03-13 ENCOUNTER — LAB (OUTPATIENT)
Dept: LAB | Facility: HOSPITAL | Age: 53
End: 2023-03-13
Payer: COMMERCIAL

## 2023-03-13 DIAGNOSIS — B18.2 CHRONIC HEPATITIS C WITHOUT HEPATIC COMA: ICD-10-CM

## 2023-03-13 DIAGNOSIS — Z51.81 ENCOUNTER FOR THERAPEUTIC DRUG MONITORING: ICD-10-CM

## 2023-03-13 DIAGNOSIS — K74.69 OTHER CIRRHOSIS OF LIVER: ICD-10-CM

## 2023-03-13 LAB
AMPHET+METHAMPHET UR QL: POSITIVE
AMPHETAMINES UR QL: POSITIVE
BARBITURATES UR QL SCN: NEGATIVE
BENZODIAZ UR QL SCN: NEGATIVE
BUPRENORPHINE SERPL-MCNC: NEGATIVE NG/ML
CANNABINOIDS SERPL QL: NEGATIVE
COCAINE UR QL: NEGATIVE
INR PPP: 1.15 (ref 0.84–1.13)
METHADONE UR QL SCN: NEGATIVE
OPIATES UR QL: NEGATIVE
OXYCODONE UR QL SCN: NEGATIVE
PCP UR QL SCN: NEGATIVE
PROPOXYPH UR QL: NEGATIVE
PROTHROMBIN TIME: 14.7 SECONDS (ref 11.4–14.4)
TRICYCLICS UR QL SCN: NEGATIVE

## 2023-03-13 PROCEDURE — 80053 COMPREHEN METABOLIC PANEL: CPT

## 2023-03-13 PROCEDURE — G0432 EIA HIV-1/HIV-2 SCREEN: HCPCS

## 2023-03-13 PROCEDURE — 87522 HEPATITIS C REVRS TRNSCRPJ: CPT

## 2023-03-13 PROCEDURE — 80306 DRUG TEST PRSMV INSTRMNT: CPT

## 2023-03-13 PROCEDURE — 85610 PROTHROMBIN TIME: CPT

## 2023-03-13 PROCEDURE — 36415 COLL VENOUS BLD VENIPUNCTURE: CPT

## 2023-03-13 PROCEDURE — 85025 COMPLETE CBC W/AUTO DIFF WBC: CPT

## 2023-03-14 ENCOUNTER — LAB (OUTPATIENT)
Dept: LAB | Facility: HOSPITAL | Age: 53
End: 2023-03-14
Payer: COMMERCIAL

## 2023-03-14 DIAGNOSIS — R19.7 DIARRHEA, UNSPECIFIED TYPE: ICD-10-CM

## 2023-03-14 LAB
ADV 40+41 DNA STL QL NAA+NON-PROBE: NOT DETECTED
ALBUMIN SERPL-MCNC: 3.4 G/DL (ref 3.5–5.2)
ALBUMIN/GLOB SERPL: 0.9 G/DL
ALP SERPL-CCNC: 92 U/L (ref 39–117)
ALT SERPL W P-5'-P-CCNC: 26 U/L (ref 1–33)
ANION GAP SERPL CALCULATED.3IONS-SCNC: 12.7 MMOL/L (ref 5–15)
AST SERPL-CCNC: 37 U/L (ref 1–32)
ASTRO TYP 1-8 RNA STL QL NAA+NON-PROBE: NOT DETECTED
BASOPHILS # BLD AUTO: 0.03 10*3/MM3 (ref 0–0.2)
BASOPHILS NFR BLD AUTO: 0.5 % (ref 0–1.5)
BILIRUB SERPL-MCNC: 0.3 MG/DL (ref 0–1.2)
BUN SERPL-MCNC: 10 MG/DL (ref 6–20)
BUN/CREAT SERPL: 11.2 (ref 7–25)
C CAYETANENSIS DNA STL QL NAA+NON-PROBE: NOT DETECTED
C COLI+JEJ+UPSA DNA STL QL NAA+NON-PROBE: NOT DETECTED
C DIFF TOX GENS STL QL NAA+PROBE: NOT DETECTED
CALCIUM SPEC-SCNC: 8.9 MG/DL (ref 8.6–10.5)
CHLORIDE SERPL-SCNC: 109 MMOL/L (ref 98–107)
CO2 SERPL-SCNC: 23.3 MMOL/L (ref 22–29)
CREAT SERPL-MCNC: 0.89 MG/DL (ref 0.57–1)
CRYPTOSP DNA STL QL NAA+NON-PROBE: NOT DETECTED
DEPRECATED RDW RBC AUTO: 47.6 FL (ref 37–54)
E HISTOLYT DNA STL QL NAA+NON-PROBE: NOT DETECTED
EAEC PAA PLAS AGGR+AATA ST NAA+NON-PRB: NOT DETECTED
EC STX1+STX2 GENES STL QL NAA+NON-PROBE: NOT DETECTED
EGFRCR SERPLBLD CKD-EPI 2021: 78.6 ML/MIN/1.73
EOSINOPHIL # BLD AUTO: 0.07 10*3/MM3 (ref 0–0.4)
EOSINOPHIL NFR BLD AUTO: 1.2 % (ref 0.3–6.2)
EPEC EAE GENE STL QL NAA+NON-PROBE: NOT DETECTED
ERYTHROCYTE [DISTWIDTH] IN BLOOD BY AUTOMATED COUNT: 13.6 % (ref 12.3–15.4)
ETEC LTA+ST1A+ST1B TOX ST NAA+NON-PROBE: NOT DETECTED
G LAMBLIA DNA STL QL NAA+NON-PROBE: NOT DETECTED
GLOBULIN UR ELPH-MCNC: 3.8 GM/DL
GLUCOSE SERPL-MCNC: 125 MG/DL (ref 65–99)
HCT VFR BLD AUTO: 34.3 % (ref 34–46.6)
HGB BLD-MCNC: 11.4 G/DL (ref 12–15.9)
HIV1+2 AB SER QL: NORMAL
IMM GRANULOCYTES # BLD AUTO: 0.01 10*3/MM3 (ref 0–0.05)
IMM GRANULOCYTES NFR BLD AUTO: 0.2 % (ref 0–0.5)
LYMPHOCYTES # BLD AUTO: 1.39 10*3/MM3 (ref 0.7–3.1)
LYMPHOCYTES NFR BLD AUTO: 24.6 % (ref 19.6–45.3)
MCH RBC QN AUTO: 31.6 PG (ref 26.6–33)
MCHC RBC AUTO-ENTMCNC: 33.2 G/DL (ref 31.5–35.7)
MCV RBC AUTO: 95 FL (ref 79–97)
MONOCYTES # BLD AUTO: 0.32 10*3/MM3 (ref 0.1–0.9)
MONOCYTES NFR BLD AUTO: 5.7 % (ref 5–12)
NEUTROPHILS NFR BLD AUTO: 3.84 10*3/MM3 (ref 1.7–7)
NEUTROPHILS NFR BLD AUTO: 67.8 % (ref 42.7–76)
NOROVIRUS GI+II RNA STL QL NAA+NON-PROBE: NOT DETECTED
NRBC BLD AUTO-RTO: 0 /100 WBC (ref 0–0.2)
P SHIGELLOIDES DNA STL QL NAA+NON-PROBE: NOT DETECTED
PLATELET # BLD AUTO: 132 10*3/MM3 (ref 140–450)
PMV BLD AUTO: 11.6 FL (ref 6–12)
POTASSIUM SERPL-SCNC: 3.7 MMOL/L (ref 3.5–5.2)
PROT SERPL-MCNC: 7.2 G/DL (ref 6–8.5)
RBC # BLD AUTO: 3.61 10*6/MM3 (ref 3.77–5.28)
RVA RNA STL QL NAA+NON-PROBE: NOT DETECTED
S ENT+BONG DNA STL QL NAA+NON-PROBE: NOT DETECTED
SAPO I+II+IV+V RNA STL QL NAA+NON-PROBE: NOT DETECTED
SHIGELLA SP+EIEC IPAH ST NAA+NON-PROBE: NOT DETECTED
SODIUM SERPL-SCNC: 145 MMOL/L (ref 136–145)
V CHOL+PARA+VUL DNA STL QL NAA+NON-PROBE: NOT DETECTED
V CHOLERAE DNA STL QL NAA+NON-PROBE: NOT DETECTED
WBC NRBC COR # BLD: 5.66 10*3/MM3 (ref 3.4–10.8)
Y ENTEROCOL DNA STL QL NAA+NON-PROBE: NOT DETECTED

## 2023-03-14 PROCEDURE — 83993 ASSAY FOR CALPROTECTIN FECAL: CPT

## 2023-03-14 PROCEDURE — 87493 C DIFF AMPLIFIED PROBE: CPT

## 2023-03-14 PROCEDURE — 87507 IADNA-DNA/RNA PROBE TQ 12-25: CPT

## 2023-03-14 RX ORDER — AZELASTINE HYDROCHLORIDE 0.5 MG/ML
SOLUTION/ DROPS OPHTHALMIC
Qty: 6 ML | Refills: 12 | Status: SHIPPED | OUTPATIENT
Start: 2023-03-14

## 2023-03-14 RX ORDER — DICLOFENAC SODIUM 75 MG/1
TABLET, DELAYED RELEASE ORAL
Qty: 60 TABLET | Refills: 1 | OUTPATIENT
Start: 2023-03-14

## 2023-03-14 NOTE — TELEPHONE ENCOUNTER
I am okay with refilling the allergy eyedrops but I am not comfortable refilling her diclofenac, she has liver disease/hepatitis and based on her urine drug screen is actively using illicit methamphetamine as well so I am not comfortable prescribing her an NSAID.

## 2023-03-15 LAB
HCV RNA SERPL NAA+PROBE-ACNC: NORMAL IU/ML
HCV RNA SERPL NAA+PROBE-LOG IU: 6.13 LOG10 IU/ML
TEST INFORMATION: NORMAL

## 2023-03-16 NOTE — PROGRESS NOTES
Anni, please check with [facility] pharmacy regarding a new PA for the last 2 months of Epclusa (our office has the first month from prior PA but PA now ). Labs confirmed continued HCV infection.

## 2023-03-18 LAB — CALPROTECTIN STL-MCNT: 218 UG/G (ref 0–120)

## 2023-03-20 ENCOUNTER — TELEMEDICINE (OUTPATIENT)
Dept: FAMILY MEDICINE CLINIC | Facility: TELEHEALTH | Age: 53
End: 2023-03-20
Payer: COMMERCIAL

## 2023-03-20 DIAGNOSIS — R19.5 ELEVATED FECAL CALPROTECTIN: ICD-10-CM

## 2023-03-20 DIAGNOSIS — R14.0 ABDOMINAL DISTENTION: ICD-10-CM

## 2023-03-20 DIAGNOSIS — R19.7 DIARRHEA, UNSPECIFIED TYPE: Primary | ICD-10-CM

## 2023-03-20 DIAGNOSIS — L01.00 IMPETIGO: ICD-10-CM

## 2023-03-20 DIAGNOSIS — R14.0 BLOATING: ICD-10-CM

## 2023-03-20 DIAGNOSIS — K13.79 MOUTH SORES: ICD-10-CM

## 2023-03-20 DIAGNOSIS — L73.9 FOLLICULITIS: Primary | ICD-10-CM

## 2023-03-20 DIAGNOSIS — R21 RASH: ICD-10-CM

## 2023-03-20 PROCEDURE — 99213 OFFICE O/P EST LOW 20 MIN: CPT | Performed by: NURSE PRACTITIONER

## 2023-03-20 RX ORDER — PERMETHRIN 50 MG/G
1 CREAM TOPICAL ONCE
Qty: 1 G | Refills: 0 | Status: SHIPPED | OUTPATIENT
Start: 2023-03-20 | End: 2023-03-20

## 2023-03-20 RX ORDER — DOXYCYCLINE HYCLATE 100 MG/1
100 CAPSULE ORAL 2 TIMES DAILY
Qty: 14 CAPSULE | Refills: 0 | Status: SHIPPED | OUTPATIENT
Start: 2023-03-20 | End: 2023-03-27

## 2023-03-20 NOTE — PROGRESS NOTES
CHIEF COMPLAINT  Chief Complaint   Patient presents with   • Rash     Face red and yellow drainage         HPI  Luz Elena Ballard is a 52 y.o. female  presents with complaint of facial sore that are red and draining yellow fluid present for 3-4 days. She has been incarcerated for 6 months. She has had blisters around her mouth as well and is on Acyclovir for this. She has blisters down her throat and to the side of her tongue bilaterally. She reports having a cough with green phlegm and occasional blood tinged.     Review of Systems   Constitutional: Negative.    HENT: Positive for ear pain (ear feel swollen inside).    Respiratory: Positive for cough (with green dischar).    Cardiovascular: Negative.    Gastrointestinal: Negative.    Musculoskeletal: Negative.    Skin: Positive for rash.        Red rash on face with yellow crusty areas and yellow drainage from the lesions, blisters on lips around edges, red bumps on hands and feet with mild swelling of lower extremities   Neurological: Negative.    Psychiatric/Behavioral: The patient is nervous/anxious.        Past Medical History:   Diagnosis Date   • Arthritis     Hx of.   • Basal cell carcinoma     Hx of.   • Cellulitis     Hx of. Resolved 2/15/2016.   • Cellulitis of finger     History of Cellulitis Fingers Right Middle Finger. Resolved 2/15/2016   • Chronic hepatitis C (HCC)    • Cirrhosis (HCC)    • Depression     Hx of.   • Fatty liver    • History of Blocked tear duct     Resolved 2/15/2016   • History of migraine headaches    • Hypercholesterolemia     Hx of.   • Infectious viral hepatitis     HEP C   • IV drug abuse (HCC)     Hx of. Resolved 2/12/2016.   • Obstructive sleep apnea     Hx of.   • Sciatica     Hx of. Resolved. 2/15/2016. Resolved. 7/21/2015.   • Sciatica    • Skin cancer     Hx of.   • Upper respiratory infection     Hx of. 2/15/2016       Family History   Problem Relation Age of Onset   • Hypertension Mother    • Arthritis Mother    •  Depression Mother    • Anxiety disorder Father    • Suicidality Father    • Depression Sister    • Diabetes Maternal Grandmother    • Breast cancer Neg Hx    • Ovarian cancer Neg Hx    • Colon cancer Neg Hx        Social History     Socioeconomic History   • Marital status:    Tobacco Use   • Smoking status: Former     Types: Cigarettes   • Smokeless tobacco: Never   Vaping Use   • Vaping Use: Never used   Substance and Sexual Activity   • Alcohol use: No   • Drug use: Not Currently     Types: IV, Heroin   • Sexual activity: Yes     Partners: Male         There were no vitals taken for this visit.    PHYSICAL EXAM  Physical Exam   Constitutional: She is oriented to person, place, and time. She appears well-developed and well-nourished. She does not have a sickly appearance. She does not appear ill. No distress.   HENT:   Head: Normocephalic and atraumatic.   Patient reports right ear is swollen   Pulmonary/Chest: Effort normal.  No respiratory distress. She no audible wheeze...  Neurological: She is alert and oriented to person, place, and time.   Skin: Skin is dry. Rash noted. Rash is maculopapular and vesicular. There is erythema.   Red papules on face and blisters around lips. Small red bumps on hands, chest and feet   Psychiatric: She has a normal mood and affect.       Results for orders placed or performed in visit on 03/14/23   Gastrointestinal Panel, PCR - Stool, Per Rectum    Specimen: Per Rectum; Stool   Result Value Ref Range    Campylobacter Not Detected Not Detected    Plesiomonas shigelloides Not Detected Not Detected    Salmonella Not Detected Not Detected    Vibrio Not Detected Not Detected    Vibrio cholerae Not Detected Not Detected    Yersinia enterocolitica Not Detected Not Detected    Enteroaggregative E. coli (EAEC) Not Detected Not Detected    Enteropathogenic E. coli (EPEC) Not Detected Not Detected    Enterotoxigenic E. coli (ETEC) lt/st Not Detected Not Detected    Shiga-like  toxin-producing E. coli (STEC) stx1/stx2 Not Detected Not Detected    Shigella/Enteroinvasive E. coli (EIEC) Not Detected Not Detected    Cryptosporidium Not Detected Not Detected    Cyclospora cayetanensis Not Detected Not Detected    Entamoeba histolytica Not Detected Not Detected    Giardia lamblia Not Detected Not Detected    Adenovirus F40/41 Not Detected Not Detected    Astrovirus Not Detected Not Detected    Norovirus GI/GII Not Detected Not Detected    Rotavirus A Not Detected Not Detected    Sapovirus (I, II, IV or V) Not Detected Not Detected   Clostridioides difficile Toxin, PCR - Stool, Per Rectum    Specimen: Per Rectum; Stool   Result Value Ref Range    C. Difficile Toxins by PCR Not Detected Not Detected   Calprotectin, Fecal - Stool, Per Rectum    Specimen: Per Rectum; Stool   Result Value Ref Range    Calprotectin, Fecal 218 (H) 0 - 120 ug/g       Diagnoses and all orders for this visit:    1. Folliculitis (Primary)  -     doxycycline (VIBRAMYCIN) 100 MG capsule; Take 1 capsule by mouth 2 (Two) Times a Day for 7 days.  Dispense: 14 capsule; Refill: 0    2. Impetigo  -     doxycycline (VIBRAMYCIN) 100 MG capsule; Take 1 capsule by mouth 2 (Two) Times a Day for 7 days.  Dispense: 14 capsule; Refill: 0  -     mupirocin (BACTROBAN) 2 % ointment; Apply 1 application topically to the appropriate area as directed 3 (Three) Times a Day.  Dispense: 30 g; Refill: 0    3. Mouth sores  -     Nystatin (magic mouthwash); Swish and spit 10 mL Every 4 (Four) Hours As Needed (mouth soreness) for up to 5 days.  Dispense: 120 mL; Refill: 0    4. Rash  -     permethrin (ELIMITE) 5 % cream; Apply 1 application topically to the appropriate area as directed 1 (One) Time for 1 dose.  Dispense: 1 g; Refill: 0    Suspect scabies. Will treat empirically  Rest and fluids         The use of a video visit has been reviewed with the patient and verbal informd consent has een obtained. Myself and Luz Elena Ballard participated in  this visit. The patient is located in [address] [place] [id number]. I am located in [place], [place]. Mychart and Zoom were utilized. I spent 15  minutes in the patient's chart for this visit.           Angela Sy, APRN  03/20/2023  17:59 EDT

## 2023-03-20 NOTE — PROGRESS NOTES
Due to symptoms and elevated fecal augie, patient awaiting EGD and colonoscopy, CT scan ordered for additional evaluation.

## 2023-03-21 DIAGNOSIS — R11.0 CHRONIC NAUSEA: ICD-10-CM

## 2023-03-21 RX ORDER — ONDANSETRON 8 MG/1
8 TABLET, ORALLY DISINTEGRATING ORAL EVERY 8 HOURS PRN
Qty: 15 TABLET | Refills: 3 | Status: SHIPPED | OUTPATIENT
Start: 2023-03-21

## 2023-03-21 RX ORDER — FLUCONAZOLE 150 MG/1
150 TABLET ORAL ONCE
Qty: 1 TABLET | Refills: 0 | Status: SHIPPED | OUTPATIENT
Start: 2023-03-21 | End: 2023-03-21

## 2023-03-28 RX ORDER — SODIUM, POTASSIUM,MAG SULFATES 17.5-3.13G
SOLUTION, RECONSTITUTED, ORAL ORAL
Qty: 354 ML | Refills: 0 | Status: SHIPPED | OUTPATIENT
Start: 2023-03-28

## 2023-04-24 ENCOUNTER — HOSPITAL ENCOUNTER (OUTPATIENT)
Dept: CT IMAGING | Facility: HOSPITAL | Age: 53
Discharge: HOME | End: 2023-04-24
Admitting: NURSE PRACTITIONER
Payer: COMMERCIAL

## 2023-04-24 DIAGNOSIS — R14.0 ABDOMINAL DISTENTION: ICD-10-CM

## 2023-04-24 DIAGNOSIS — R19.5 ELEVATED FECAL CALPROTECTIN: ICD-10-CM

## 2023-04-24 DIAGNOSIS — R19.7 DIARRHEA, UNSPECIFIED TYPE: ICD-10-CM

## 2023-04-24 DIAGNOSIS — R14.0 BLOATING: ICD-10-CM

## 2023-04-24 PROCEDURE — 74176 CT ABD & PELVIS W/O CONTRAST: CPT

## 2023-04-26 ENCOUNTER — HOSPITAL ENCOUNTER (OUTPATIENT)
Dept: ULTRASOUND IMAGING | Facility: HOSPITAL | Age: 53
Discharge: HOME | End: 2023-04-26
Admitting: NURSE PRACTITIONER
Payer: COMMERCIAL

## 2023-04-26 DIAGNOSIS — K74.69 OTHER CIRRHOSIS OF LIVER: ICD-10-CM

## 2023-04-26 PROCEDURE — 76705 ECHO EXAM OF ABDOMEN: CPT

## 2023-04-27 NOTE — PROGRESS NOTES
Anni, please call the patient to review the following information with the patient:     Please also let her know that she does not need to go to  gastroenterology (appointment on 6/13/2023)  and continue care at our office. Recommend she decide if she plans to continue care here or transfer care to  and cancel appointment(s) at the facility she does not plan to go to.     Ultrasound of the liver revealed changes of cirrhosis with small amount of fluid around the liver.  I recommend low sodium/salt diet of less than 2000 mg or 2 g/day.  We may consider starting fluid pills in the future.  Plan to discuss this at follow-up appointment but sodium/salt restriction may be enough to decrease fluid around the liver.    CT scan revealed findings of cirrhosis and small amount of fluid, also changes within the colon possibly from cirrhosis but I recommend colonoscopy as previously scheduled.  If she would like to reschedule EGD and colonoscopy that were previously scheduled please connect her to [facility] to reschedule procedures.

## 2023-05-22 ENCOUNTER — TELEPHONE (OUTPATIENT)
Dept: GASTROENTEROLOGY | Facility: CLINIC | Age: 53
End: 2023-05-22
Payer: COMMERCIAL

## 2023-05-22 NOTE — TELEPHONE ENCOUNTER
Rickey called and lvm stating pt went to a new provider and they are trying to retrieve the medication they sent in September.   We located the medication and Rickey is sending us a fedex box with a return label in it so we can ship the medication back to them.

## 2023-09-11 RX ORDER — TOPIRAMATE 25 MG/1
TABLET ORAL
Qty: 60 TABLET | Refills: 5 | Status: SHIPPED | OUTPATIENT
Start: 2023-09-11

## 2023-09-13 DIAGNOSIS — I10 ESSENTIAL HYPERTENSION: Chronic | ICD-10-CM

## 2023-09-13 RX ORDER — DULOXETIN HYDROCHLORIDE 60 MG/1
60 CAPSULE, DELAYED RELEASE ORAL DAILY
Qty: 30 CAPSULE | Refills: 0 | Status: SHIPPED | OUTPATIENT
Start: 2023-09-13

## 2023-09-13 RX ORDER — LISINOPRIL 20 MG/1
20 TABLET ORAL DAILY
Qty: 30 TABLET | Refills: 0 | Status: SHIPPED | OUTPATIENT
Start: 2023-09-13

## 2023-09-13 NOTE — TELEPHONE ENCOUNTER
Caller: Luz Elena Ballard    Relationship: Self    Best call back number: [phone]     Requested Prescriptions:   Requested Prescriptions     Pending Prescriptions Disp Refills    lisinopril (PRINIVIL,ZESTRIL) 20 MG tablet 30 tablet 5     Sig: Take 1 tablet by mouth Daily. For BP    DULoxetine (Cymbalta) 60 MG capsule 30 capsule 5     Sig: Take 1 capsule by mouth Daily. New dose        Pharmacy where request should be sent: [id number] [address] AT [facility] [address] - [phone] [facility] [phone] FX     Last office visit with prescribing clinician: 3/8/2023   Last telemedicine visit with prescribing clinician: Visit date not found   Next office visit with prescribing clinician: 9/19/2023     Additional details provided by patient: OUT OF MEDICATIONS. PATIENT WOULD LIKE TO ASK THAT PCP INCREASE CYMBALTA TO 90 MG.     Does the patient have less than a 3 day supply:  [x] Yes  [] No    Would you like a call back once the refill request has been completed: [] Yes [x] No    If the office needs to give you a call back, can they leave a voicemail: [] Yes [x] No    Mansi Rose Rep   09/13/23 15:22 EDT

## 2023-09-19 ENCOUNTER — TELEPHONE (OUTPATIENT)
Dept: FAMILY MEDICINE CLINIC | Facility: CLINIC | Age: 53
End: 2023-09-19

## 2023-09-19 NOTE — TELEPHONE ENCOUNTER
Caller: Luz Elena Ballard    Relationship: Self    Best call back number: [phone]     What is the best time to reach you: ANY    Who are you requesting to speak with (clinical staff, provider,  specific staff member): RAVIN ISLAS    What was the call regarding: THE PATIENT WANTED TO LET RAVIN ISLAS KNOW THAT THE REASON SHE CANCELED HER APPOINTMENT TODAY 09.19.23 WAS THAT SHE WAS IN A FENDER [place] ON HER WAY TO THE OFFICE. SHE IS OK BUT WAS SHAKEN UP. THE PATIENT SCHEDULED FOR THE FIRST AVAILABLE TOMORROW 09.20.23 AND WILL BE IN THEN.     Is it okay if the provider responds through MyChart: NO

## 2023-10-24 DIAGNOSIS — I10 ESSENTIAL HYPERTENSION: Chronic | ICD-10-CM

## 2023-10-24 RX ORDER — LISINOPRIL 20 MG/1
20 TABLET ORAL DAILY
Qty: 30 TABLET | Refills: 0 | Status: SHIPPED | OUTPATIENT
Start: 2023-10-24

## 2023-10-26 RX ORDER — DULOXETIN HYDROCHLORIDE 60 MG/1
60 CAPSULE, DELAYED RELEASE ORAL DAILY
Qty: 30 CAPSULE | Refills: 0 | Status: SHIPPED | OUTPATIENT
Start: 2023-10-26

## 2023-12-28 PROCEDURE — 87086 URINE CULTURE/COLONY COUNT: CPT | Performed by: NURSE PRACTITIONER

## 2024-01-04 ENCOUNTER — TELEPHONE (OUTPATIENT)
Dept: INTERNAL MEDICINE | Facility: CLINIC | Age: 54
End: 2024-01-04

## 2024-01-04 NOTE — TELEPHONE ENCOUNTER
Pt called to establish at this office. [facility] made new PT appt on 2/12. Pt requested insulin be called in for her at this office because she was dismissed at her old PCP. Told PT she would have to call her old PCP to get the refill.

## 2024-02-15 ENCOUNTER — OFFICE VISIT (OUTPATIENT)
Dept: INTERNAL MEDICINE | Facility: CLINIC | Age: 54
End: 2024-02-15
Payer: MEDICAID

## 2024-02-15 VITALS
HEIGHT: 60 IN | DIASTOLIC BLOOD PRESSURE: 82 MMHG | HEART RATE: 84 BPM | BODY MASS INDEX: 28.58 KG/M2 | TEMPERATURE: 97.7 F | RESPIRATION RATE: 20 BRPM | WEIGHT: 145.6 LBS | SYSTOLIC BLOOD PRESSURE: 154 MMHG

## 2024-02-15 DIAGNOSIS — E11.65 TYPE 2 DIABETES MELLITUS WITH HYPERGLYCEMIA, WITHOUT LONG-TERM CURRENT USE OF INSULIN: ICD-10-CM

## 2024-02-15 DIAGNOSIS — I10 PRIMARY HYPERTENSION: Chronic | ICD-10-CM

## 2024-02-15 DIAGNOSIS — M54.50 CHRONIC LOW BACK PAIN, UNSPECIFIED BACK PAIN LATERALITY, UNSPECIFIED WHETHER SCIATICA PRESENT: ICD-10-CM

## 2024-02-15 DIAGNOSIS — Z00.00 ROUTINE HEALTH MAINTENANCE: Primary | ICD-10-CM

## 2024-02-15 DIAGNOSIS — Z12.4 CERVICAL CANCER SCREENING: ICD-10-CM

## 2024-02-15 DIAGNOSIS — Z12.12 ENCOUNTER FOR COLORECTAL CANCER SCREENING: ICD-10-CM

## 2024-02-15 DIAGNOSIS — I10 ESSENTIAL HYPERTENSION: Chronic | ICD-10-CM

## 2024-02-15 DIAGNOSIS — I85.10 ESOPHAGEAL VARICES IN CIRRHOSIS: ICD-10-CM

## 2024-02-15 DIAGNOSIS — R76.8 HEPATITIS C ANTIBODY TEST POSITIVE: ICD-10-CM

## 2024-02-15 DIAGNOSIS — K74.60 CIRRHOSIS OF LIVER WITHOUT ASCITES, UNSPECIFIED HEPATIC CIRRHOSIS TYPE: ICD-10-CM

## 2024-02-15 DIAGNOSIS — G89.29 CHRONIC LOW BACK PAIN, UNSPECIFIED BACK PAIN LATERALITY, UNSPECIFIED WHETHER SCIATICA PRESENT: ICD-10-CM

## 2024-02-15 DIAGNOSIS — Z86.69 HISTORY OF ITCHING OF EYE: ICD-10-CM

## 2024-02-15 DIAGNOSIS — K21.9 GASTROESOPHAGEAL REFLUX DISEASE, UNSPECIFIED WHETHER ESOPHAGITIS PRESENT: Chronic | ICD-10-CM

## 2024-02-15 DIAGNOSIS — F41.1 GAD (GENERALIZED ANXIETY DISORDER): Chronic | ICD-10-CM

## 2024-02-15 DIAGNOSIS — Z76.89 ENCOUNTER TO ESTABLISH CARE: ICD-10-CM

## 2024-02-15 DIAGNOSIS — M79.89 LEG SWELLING: ICD-10-CM

## 2024-02-15 DIAGNOSIS — Z12.11 ENCOUNTER FOR COLORECTAL CANCER SCREENING: ICD-10-CM

## 2024-02-15 DIAGNOSIS — Z23 ENCOUNTER FOR VACCINATION: ICD-10-CM

## 2024-02-15 DIAGNOSIS — F32.89 OTHER DEPRESSION: ICD-10-CM

## 2024-02-15 DIAGNOSIS — Z78.0 ASYMPTOMATIC POSTMENOPAUSAL STATE: ICD-10-CM

## 2024-02-15 DIAGNOSIS — Z13.6 SCREENING FOR ISCHEMIC HEART DISEASE: ICD-10-CM

## 2024-02-15 DIAGNOSIS — G47.00 INSOMNIA, UNSPECIFIED TYPE: ICD-10-CM

## 2024-02-15 DIAGNOSIS — K74.60 ESOPHAGEAL VARICES IN CIRRHOSIS: ICD-10-CM

## 2024-02-15 LAB
ALPHA-FETOPROTEIN: 5.66 NG/ML (ref 0–8.3)
BASOPHILS # BLD AUTO: 0.02 10*3/MM3 (ref 0–0.2)
BASOPHILS NFR BLD AUTO: 0.5 % (ref 0–1.5)
DEPRECATED RDW RBC AUTO: 43 FL (ref 37–54)
EOSINOPHIL # BLD AUTO: 0.05 10*3/MM3 (ref 0–0.4)
EOSINOPHIL NFR BLD AUTO: 1.2 % (ref 0.3–6.2)
ERYTHROCYTE [DISTWIDTH] IN BLOOD BY AUTOMATED COUNT: 13.4 % (ref 12.3–15.4)
HBA1C MFR BLD: 9.5 % (ref 4.8–5.6)
HCT VFR BLD AUTO: 33.9 % (ref 34–46.6)
HGB BLD-MCNC: 11.7 G/DL (ref 12–15.9)
IMM GRANULOCYTES # BLD AUTO: 0 10*3/MM3 (ref 0–0.05)
IMM GRANULOCYTES NFR BLD AUTO: 0 % (ref 0–0.5)
LYMPHOCYTES # BLD AUTO: 1.33 10*3/MM3 (ref 0.7–3.1)
LYMPHOCYTES NFR BLD AUTO: 32.1 % (ref 19.6–45.3)
MCH RBC QN AUTO: 31.1 PG (ref 26.6–33)
MCHC RBC AUTO-ENTMCNC: 34.5 G/DL (ref 31.5–35.7)
MCV RBC AUTO: 90.2 FL (ref 79–97)
MONOCYTES # BLD AUTO: 0.26 10*3/MM3 (ref 0.1–0.9)
MONOCYTES NFR BLD AUTO: 6.3 % (ref 5–12)
NEUTROPHILS NFR BLD AUTO: 2.48 10*3/MM3 (ref 1.7–7)
NEUTROPHILS NFR BLD AUTO: 59.9 % (ref 42.7–76)
NRBC BLD AUTO-RTO: 0 /100 WBC (ref 0–0.2)
PLATELET # BLD AUTO: 133 10*3/MM3 (ref 140–450)
PMV BLD AUTO: 11 FL (ref 6–12)
RBC # BLD AUTO: 3.76 10*6/MM3 (ref 3.77–5.28)
WBC NRBC COR # BLD AUTO: 4.14 10*3/MM3 (ref 3.4–10.8)

## 2024-02-15 PROCEDURE — 87522 HEPATITIS C REVRS TRNSCRPJ: CPT | Performed by: STUDENT IN AN ORGANIZED HEALTH CARE EDUCATION/TRAINING PROGRAM

## 2024-02-15 PROCEDURE — 82105 ALPHA-FETOPROTEIN SERUM: CPT | Performed by: STUDENT IN AN ORGANIZED HEALTH CARE EDUCATION/TRAINING PROGRAM

## 2024-02-15 PROCEDURE — 80061 LIPID PANEL: CPT | Performed by: STUDENT IN AN ORGANIZED HEALTH CARE EDUCATION/TRAINING PROGRAM

## 2024-02-15 PROCEDURE — 84439 ASSAY OF FREE THYROXINE: CPT | Performed by: STUDENT IN AN ORGANIZED HEALTH CARE EDUCATION/TRAINING PROGRAM

## 2024-02-15 PROCEDURE — 83036 HEMOGLOBIN GLYCOSYLATED A1C: CPT | Performed by: STUDENT IN AN ORGANIZED HEALTH CARE EDUCATION/TRAINING PROGRAM

## 2024-02-15 PROCEDURE — 83880 ASSAY OF NATRIURETIC PEPTIDE: CPT | Performed by: STUDENT IN AN ORGANIZED HEALTH CARE EDUCATION/TRAINING PROGRAM

## 2024-02-15 PROCEDURE — 85610 PROTHROMBIN TIME: CPT | Performed by: STUDENT IN AN ORGANIZED HEALTH CARE EDUCATION/TRAINING PROGRAM

## 2024-02-15 PROCEDURE — 85730 THROMBOPLASTIN TIME PARTIAL: CPT | Performed by: STUDENT IN AN ORGANIZED HEALTH CARE EDUCATION/TRAINING PROGRAM

## 2024-02-15 PROCEDURE — 80050 GENERAL HEALTH PANEL: CPT | Performed by: STUDENT IN AN ORGANIZED HEALTH CARE EDUCATION/TRAINING PROGRAM

## 2024-02-15 RX ORDER — QUETIAPINE FUMARATE 25 MG/1
25 TABLET, FILM COATED ORAL NIGHTLY
Qty: 90 TABLET | Refills: 3 | Status: SHIPPED | OUTPATIENT
Start: 2024-02-15

## 2024-02-15 RX ORDER — CARVEDILOL 3.12 MG/1
3.12 TABLET ORAL 2 TIMES DAILY WITH MEALS
Qty: 60 TABLET | Refills: 11 | Status: SHIPPED | OUTPATIENT
Start: 2024-02-15

## 2024-02-15 RX ORDER — BUPROPION HYDROCHLORIDE 100 MG/1
100 TABLET, EXTENDED RELEASE ORAL DAILY
Qty: 30 TABLET | Refills: 11 | Status: SHIPPED | OUTPATIENT
Start: 2024-02-15

## 2024-02-15 RX ORDER — ZOSTER VACCINE RECOMBINANT, ADJUVANTED 50 MCG/0.5
0.5 KIT INTRAMUSCULAR ONCE
Qty: 1 EACH | Refills: 1 | Status: SHIPPED | OUTPATIENT
Start: 2024-02-15 | End: 2024-02-15

## 2024-02-15 RX ORDER — AZELASTINE HYDROCHLORIDE 0.5 MG/ML
1 SOLUTION/ DROPS OPHTHALMIC DAILY PRN
Qty: 6 ML | Refills: 12 | Status: SHIPPED | OUTPATIENT
Start: 2024-02-15

## 2024-02-15 RX ORDER — LISINOPRIL 20 MG/1
20 TABLET ORAL DAILY
Qty: 90 TABLET | Refills: 3 | Status: SHIPPED | OUTPATIENT
Start: 2024-02-15

## 2024-02-15 RX ORDER — PANTOPRAZOLE SODIUM 40 MG/1
40 TABLET, DELAYED RELEASE ORAL DAILY
Qty: 30 TABLET | Refills: 11 | Status: SHIPPED | OUTPATIENT
Start: 2024-02-15

## 2024-02-15 RX ORDER — LIDOCAINE 50 MG/G
1 PATCH TOPICAL EVERY 24 HOURS
Qty: 30 EACH | Refills: 11 | Status: SHIPPED | OUTPATIENT
Start: 2024-02-15

## 2024-02-16 DIAGNOSIS — E11.65 TYPE 2 DIABETES MELLITUS WITH HYPERGLYCEMIA, WITHOUT LONG-TERM CURRENT USE OF INSULIN: Primary | ICD-10-CM

## 2024-02-16 PROBLEM — G47.00 INSOMNIA: Status: ACTIVE | Noted: 2024-02-16

## 2024-02-16 LAB
ALBUMIN SERPL-MCNC: 3.8 G/DL (ref 3.5–5.2)
ALBUMIN/CREATININE RATIO, URINE: <30
ALBUMIN/GLOB SERPL: 1.3 G/DL
ALP SERPL-CCNC: 66 U/L (ref 39–117)
ALT SERPL W P-5'-P-CCNC: 32 U/L (ref 1–33)
ANION GAP SERPL CALCULATED.3IONS-SCNC: 10 MMOL/L (ref 5–15)
APTT PPP: 32.2 SECONDS (ref 22–39)
AST SERPL-CCNC: 47 U/L (ref 1–32)
BILIRUB SERPL-MCNC: 0.9 MG/DL (ref 0–1.2)
BUN SERPL-MCNC: 9 MG/DL (ref 6–20)
BUN/CREAT SERPL: 9.9 (ref 7–25)
CALCIUM SPEC-SCNC: 8.8 MG/DL (ref 8.6–10.5)
CHLORIDE SERPL-SCNC: 110 MMOL/L (ref 98–107)
CHOLEST SERPL-MCNC: 143 MG/DL (ref 0–200)
CO2 SERPL-SCNC: 25 MMOL/L (ref 22–29)
CREAT SERPL-MCNC: 0.91 MG/DL (ref 0.57–1)
EGFRCR SERPLBLD CKD-EPI 2021: 76.1 ML/MIN/1.73
EXPIRATION DATE: NORMAL
GLOBULIN UR ELPH-MCNC: 3 GM/DL
GLUCOSE SERPL-MCNC: 136 MG/DL (ref 65–99)
HDLC SERPL-MCNC: 56 MG/DL (ref 40–60)
INR PPP: 1.17 (ref 0.89–1.12)
LDLC SERPL CALC-MCNC: 71 MG/DL (ref 0–100)
LDLC/HDLC SERPL: 1.26 {RATIO}
Lab: NORMAL
NT-PROBNP SERPL-MCNC: 156 PG/ML (ref 0–900)
POC CREATININE URINE: 200
POC MICROALBUMIN URINE: 30
POTASSIUM SERPL-SCNC: 3.1 MMOL/L (ref 3.5–5.2)
PROT SERPL-MCNC: 6.8 G/DL (ref 6–8.5)
PROTHROMBIN TIME: 15 SECONDS (ref 12.2–14.5)
SODIUM SERPL-SCNC: 145 MMOL/L (ref 136–145)
T4 FREE SERPL-MCNC: 1.14 NG/DL (ref 0.93–1.7)
TRIGL SERPL-MCNC: 82 MG/DL (ref 0–150)
TSH SERPL DL<=0.05 MIU/L-ACNC: 0.58 UIU/ML (ref 0.27–4.2)
VLDLC SERPL-MCNC: 16 MG/DL (ref 5–40)

## 2024-02-16 RX ORDER — METFORMIN HYDROCHLORIDE 500 MG/1
1000 TABLET, EXTENDED RELEASE ORAL
Qty: 180 TABLET | Refills: 3 | Status: SHIPPED | OUTPATIENT
Start: 2024-02-16

## 2024-02-19 ENCOUNTER — TELEPHONE (OUTPATIENT)
Dept: INTERNAL MEDICINE | Facility: CLINIC | Age: 54
End: 2024-02-19
Payer: MEDICAID

## 2024-02-19 DIAGNOSIS — F41.9 ANXIETY: Primary | ICD-10-CM

## 2024-02-19 LAB
HCV RNA SERPL NAA+PROBE-ACNC: NORMAL IU/ML
HCV RNA SERPL NAA+PROBE-LOG IU: 5.7 LOG10 IU/ML
TEST INFORMATION: NORMAL

## 2024-02-19 NOTE — TELEPHONE ENCOUNTER
Please notify patient that buspirone (BuSpar) is generally not recommended for use in patients with severe hepatic impairment, defer starting this medication until she is evaluated by psychiatry for them to determine if they feel this is safe.    We will work on the prior authorization for Seroquel.  Other option is trazodone.  Please let me know if she would like to try trazodone.    Dr. Astorga

## 2024-02-19 NOTE — TELEPHONE ENCOUNTER
Caller: Luz Elena Ballard    Relationship: Self    Best call back number: [phone]     What is the best time to reach you: ANY    Who are you requesting to speak with (clinical staff, provider,  specific staff member): NURSE    Do you know the name of the person who called: PATIENT    What was the call regarding: PATIENT STATES SEROQUEL NEEDS A PRIOR AUTHORIZATION.  SHE WOULD LIKE SOMETHING ELSE.     PATIENT WOULD ALSO LIKE TO TRY BUSPAR FOR THE ANXIETY.      PHARMACY:  [facility]    Is it okay if the provider responds through MyChart: [facility]ART

## 2024-02-19 NOTE — TELEPHONE ENCOUNTER
Patient has been given providers message and verb good understanding. Patient stated that she would rather stick with the seroquel.      PA submitted for seroquel    Key: [id number]

## 2024-02-27 ENCOUNTER — OFFICE VISIT (OUTPATIENT)
Dept: BEHAVIORAL HEALTH | Facility: CLINIC | Age: 54
End: 2024-02-27
Payer: MEDICAID

## 2024-02-27 VITALS
WEIGHT: 145 LBS | HEIGHT: 60 IN | DIASTOLIC BLOOD PRESSURE: 74 MMHG | SYSTOLIC BLOOD PRESSURE: 128 MMHG | HEART RATE: 69 BPM | OXYGEN SATURATION: 96 % | BODY MASS INDEX: 28.47 KG/M2

## 2024-02-27 DIAGNOSIS — F33.1 MODERATE EPISODE OF RECURRENT MAJOR DEPRESSIVE DISORDER: ICD-10-CM

## 2024-02-27 DIAGNOSIS — F41.1 GAD (GENERALIZED ANXIETY DISORDER): Primary | Chronic | ICD-10-CM

## 2024-02-27 DIAGNOSIS — G47.00 INSOMNIA, UNSPECIFIED TYPE: ICD-10-CM

## 2024-02-27 RX ORDER — MIRTAZAPINE 7.5 MG/1
7.5 TABLET, FILM COATED ORAL NIGHTLY
Qty: 30 TABLET | Refills: 0 | Status: SHIPPED | OUTPATIENT
Start: 2024-02-27
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
## 2024-02-28 ENCOUNTER — TELEPHONE (OUTPATIENT)
Dept: GASTROENTEROLOGY | Facility: CLINIC | Age: 54
End: 2024-02-28

## 2024-02-28 ENCOUNTER — LAB (OUTPATIENT)
Dept: LAB | Facility: HOSPITAL | Age: 54
End: 2024-02-28
Payer: MEDICAID

## 2024-02-28 ENCOUNTER — OFFICE VISIT (OUTPATIENT)
Dept: GASTROENTEROLOGY | Facility: CLINIC | Age: 54
End: 2024-02-28
Payer: MEDICAID

## 2024-02-28 VITALS — HEIGHT: 60 IN | WEIGHT: 145.6 LBS | BODY MASS INDEX: 28.58 KG/M2

## 2024-02-28 DIAGNOSIS — K76.6 PORTAL HYPERTENSIVE GASTROPATHY: ICD-10-CM

## 2024-02-28 DIAGNOSIS — B18.2 CHRONIC HEPATITIS C WITHOUT HEPATIC COMA: ICD-10-CM

## 2024-02-28 DIAGNOSIS — Z12.11 SCREEN FOR COLON CANCER: ICD-10-CM

## 2024-02-28 DIAGNOSIS — R14.0 ABDOMINAL DISTENTION: ICD-10-CM

## 2024-02-28 DIAGNOSIS — R14.0 BLOATING: ICD-10-CM

## 2024-02-28 DIAGNOSIS — K74.69 OTHER CIRRHOSIS OF LIVER: ICD-10-CM

## 2024-02-28 DIAGNOSIS — K31.89 PORTAL HYPERTENSIVE GASTROPATHY: ICD-10-CM

## 2024-02-28 DIAGNOSIS — R19.5 ELEVATED FECAL CALPROTECTIN: ICD-10-CM

## 2024-02-28 DIAGNOSIS — B18.2 CHRONIC HEPATITIS C WITHOUT HEPATIC COMA: Primary | ICD-10-CM

## 2024-02-28 PROBLEM — F33.1 MODERATE EPISODE OF RECURRENT MAJOR DEPRESSIVE DISORDER: Status: ACTIVE | Noted: 2024-02-28

## 2024-02-28 PROCEDURE — 1159F MED LIST DOCD IN RCRD: CPT | Performed by: INTERNAL MEDICINE

## 2024-02-28 PROCEDURE — 1160F RVW MEDS BY RX/DR IN RCRD: CPT | Performed by: INTERNAL MEDICINE

## 2024-02-28 PROCEDURE — 99214 OFFICE O/P EST MOD 30 MIN: CPT | Performed by: INTERNAL MEDICINE

## 2024-02-28 RX ORDER — CARVEDILOL 6.25 MG/1
6.25 TABLET ORAL 2 TIMES DAILY WITH MEALS
Qty: 60 TABLET | Refills: 11 | Status: SHIPPED | OUTPATIENT
Start: 2024-02-28

## 2024-02-28 NOTE — TELEPHONE ENCOUNTER
03/19/21 1400   Quick Adds   Quick Adds Certification   Type of Visit Initial PT Evaluation   Living Environment   People in home alone   Current Living Arrangements apartment   Home Accessibility no concerns   Transportation Anticipated health plan transportation   Living Environment Comments Patient states his apartment is fully wheelchair acessible.    Self-Care   Usual Activity Tolerance fair   Current Activity Tolerance poor   Regular Exercise No   Equipment Currently Used at Home wheelchair, manual   Activity/Exercise/Self-Care Comment Patient reports Mod I with functional transfers, wheelchair management and ADLs at baseline.    Disability/Function   Hearing Difficulty or Deaf no   Wear Glasses or Blind yes   Vision Management glasses   Difficulty Communicating no   Difficulty Eating/Swallowing no   Fall history within last six months no   General Information   Onset of Illness/Injury or Date of Surgery 03/18/21   Referring Physician Simona Cruz PA-C   Patient/Family Therapy Goals Statement (PT) to go home   Pertinent History of Current Problem (include personal factors and/or comorbidities that impact the POC) Patient is 70 YO M admitted from home due to concern for pain control with MICHAEL and chronic R LE wounds. He was recently discharged from TCU. PMH: L BKA, severe COPD on 3L home O2, chronic pain, CKD   Existing Precautions/Restrictions fall   Weight-Bearing Status - LUE full weight-bearing   Weight-Bearing Status - RUE full weight-bearing   General Observations Activity orders: up with assist   Cognition   Orientation Status (Cognition) oriented x 3   Affect/Mental Status (Cognition) agitated   Follows Commands (Cognition) follows two-step commands   Behavioral Issues verbal outbursts   Safety Deficit (Cognition) awareness of need for assistance   Cognitive Status Comments Patient intermittently agitated with therapist during session.    Pain Assessment   Patient Currently in Pain Yes, see  LEFT VM FOR PT TO SCHEDULE SCREENING COLONOSCOPY WITH DR. LUNA   Vital Sign flowsheet  (Did not rate or say where pain was located)   Integumentary/Edema   Integumentary/Edema Comments R LE bandaged   Posture    Posture Forward head position;Protracted shoulders   Range of Motion (ROM)   ROM Quick Adds ROM WFL   Strength   Strength Comments WFL for transfers, deferred formal MMT due to patient's agitation towards therapist.    Bed Mobility   Bed Mobility supine-sit;sit-supine   Supine-Sit [place] (Bed Mobility) independent   Sit-Supine [place] (Bed Mobility) independent   Transfers   Transfers bed-chair transfer   Bed-Chair Transfer   Bed-Chair [place] (Transfers) modified independence   Assistive Device (Bed-Chair Transfers) wheelchair   Bed/Chair Transfer Comments Patient transferred to/from wheelchair with modified independence. PT only managed lines/tubes.    Gait/Stairs (Locomotion)   Comment (Gait/Stairs) Patient is non-ambulatory at baseline and declined wheelchair mobility as hospital wheelchair was too big for him.    Balance   Balance Comments Good sitting balance, able to manage Rooke boot.    Clinical Impression   Criteria for Skilled Therapeutic Intervention no;evaluation only;current level of function same as previous level of function   Clinical Presentation Stable/Uncomplicated   Clinical Presentation Rationale clinical judgement, medical status   Clinical Decision Making (Complexity) low complexity   Therapy Frequency (PT) Evaluation only   Predicted Duration of Therapy Intervention (days/wks) Evaluation only   Risk & Benefits of therapy have been explained evaluation/treatment results reviewed;participants voiced agreement with care plan;participants included;patient   Clinical Impression Comments Patient with very limited participation in evaluation. At end of session, patient in supine with alarm activated.    PT Discharge Planning    PT Discharge Recommendation (DC Rec) home   PT Rationale for DC Rec Patient is modified independent with  functional transfers and is wheelchair bound at baseline. Patient does not appear to have any acute skilled PT needs.    PT Brief overview of current status  Per chart review, patient has been non-compliant with wound cares and may have TCU needs for medical services.    Total Evaluation Time   Total Evaluation Time (Minutes) 18     Updated RN and SW after session on discharge recommendations.
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
## 2024-03-05 ENCOUNTER — HOSPITAL ENCOUNTER (OUTPATIENT)
Dept: BONE DENSITY | Facility: HOSPITAL | Age: 54
Discharge: HOME | End: 2024-03-05
Payer: MEDICAID

## 2024-03-05 ENCOUNTER — LAB (OUTPATIENT)
Dept: LAB | Facility: HOSPITAL | Age: 54
End: 2024-03-05
Payer: MEDICAID

## 2024-03-05 DIAGNOSIS — B18.2 CHRONIC HEPATITIS C WITHOUT HEPATIC COMA: ICD-10-CM

## 2024-03-05 DIAGNOSIS — Z78.0 ASYMPTOMATIC POSTMENOPAUSAL STATE: ICD-10-CM

## 2024-03-05 PROCEDURE — 77080 DXA BONE DENSITY AXIAL: CPT

## 2024-03-18 ENCOUNTER — OFFICE VISIT (OUTPATIENT)
Dept: BEHAVIORAL HEALTH | Facility: CLINIC | Age: 54
End: 2024-03-18
Payer: MEDICAID

## 2024-03-18 VITALS
BODY MASS INDEX: 28.66 KG/M2 | WEIGHT: 146 LBS | OXYGEN SATURATION: 98 % | SYSTOLIC BLOOD PRESSURE: 136 MMHG | DIASTOLIC BLOOD PRESSURE: 86 MMHG | HEIGHT: 60 IN | HEART RATE: 69 BPM

## 2024-03-18 DIAGNOSIS — F41.1 GAD (GENERALIZED ANXIETY DISORDER): Chronic | ICD-10-CM

## 2024-03-18 DIAGNOSIS — F33.1 MODERATE EPISODE OF RECURRENT MAJOR DEPRESSIVE DISORDER: Primary | ICD-10-CM

## 2024-03-18 DIAGNOSIS — G47.00 INSOMNIA, UNSPECIFIED TYPE: ICD-10-CM

## 2024-03-18 RX ORDER — MIRTAZAPINE 7.5 MG/1
7.5 TABLET, FILM COATED ORAL NIGHTLY
Qty: 30 TABLET | Refills: 0 | Status: SHIPPED | OUTPATIENT
Start: 2024-03-18
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
## 2024-03-22 ENCOUNTER — HOSPITAL ENCOUNTER (OUTPATIENT)
Dept: ULTRASOUND IMAGING | Facility: HOSPITAL | Age: 54
Discharge: HOME | End: 2024-03-22
Payer: MEDICAID

## 2024-03-22 ENCOUNTER — TELEPHONE (OUTPATIENT)
Dept: INTERNAL MEDICINE | Facility: CLINIC | Age: 54
End: 2024-03-22
Payer: MEDICAID

## 2024-03-22 DIAGNOSIS — K74.60 CIRRHOSIS OF LIVER WITHOUT ASCITES, UNSPECIFIED HEPATIC CIRRHOSIS TYPE: ICD-10-CM

## 2024-03-22 DIAGNOSIS — E11.65 TYPE 2 DIABETES MELLITUS WITH HYPERGLYCEMIA, WITHOUT LONG-TERM CURRENT USE OF INSULIN: Primary | ICD-10-CM

## 2024-03-22 PROCEDURE — 76705 ECHO EXAM OF ABDOMEN: CPT

## 2024-03-22 NOTE — TELEPHONE ENCOUNTER
Caller: Luz Elena Ballard    Relationship: Self    Best call back number: [phone]     What medication are you requesting: BD PEN NEEDLES 4MM X 32    What are your current symptoms:     How long have you been experiencing symptoms:     Have you had these symptoms before:    [] Yes  [] No    Have you been treated for these symptoms before:   [] Yes  [] No    If a prescription is needed, what is your preferred pharmacy and phone number: [id number] [address] AT [facility] [place], [facility] - [phone] [facility] [phone] FX     Additional notes:PATIENT IS REQUESTING TO HAVE THIS SCRIPT SENT TO THE PHARMACY.     PLEASE CALL PATIENT ONCE THE REQUEST HAS BEEN SENT TO THE PHARMACY

## 2024-04-18 ENCOUNTER — OFFICE VISIT (OUTPATIENT)
Dept: BEHAVIORAL HEALTH | Facility: CLINIC | Age: 54
End: 2024-04-18
Payer: MEDICAID

## 2024-04-18 VITALS
BODY MASS INDEX: 28.66 KG/M2 | HEART RATE: 66 BPM | WEIGHT: 146 LBS | OXYGEN SATURATION: 95 % | SYSTOLIC BLOOD PRESSURE: 118 MMHG | DIASTOLIC BLOOD PRESSURE: 64 MMHG | HEIGHT: 60 IN

## 2024-04-18 DIAGNOSIS — F33.1 MODERATE EPISODE OF RECURRENT MAJOR DEPRESSIVE DISORDER: Primary | ICD-10-CM

## 2024-04-18 DIAGNOSIS — G47.00 INSOMNIA, UNSPECIFIED TYPE: ICD-10-CM

## 2024-04-18 RX ORDER — MIRTAZAPINE 7.5 MG/1
7.5 TABLET, FILM COATED ORAL NIGHTLY
Qty: 30 TABLET | Refills: 0 | Status: SHIPPED | OUTPATIENT
Start: 2024-04-18

## 2024-04-18 NOTE — PROGRESS NOTES
Follow Up Office Visit      Patient Name: Luz Elena Ballard  : 1970   MRN: [id number]     Referring Provider: Sal Astorga MD    Chief Complaint:      ICD-10-CM ICD-9-CM   1. Moderate episode of recurrent major depressive disorder  F33.1 296.32   2. Insomnia, unspecified type  G47.00 780.52        History of Present Illness:   Luz Elena Ballard is a 53 y.o. female who is here today for follow up and medication management    Subjective      Patient Reports: that her anxiety and depression have gotten worse after increasing the vraylar. She reports that she has always had trouble sleeping, and sometimes freaks out because she thinks she sees mice in her bed. She reports that they have had mice before, but they don't have any now. She reports that she has a history of going a few nights without any sleep, but is sleeping about 4-6 hours now and not feeling tired during the day. She reports that she did see and improvement in her depression and anxiety when starting the vraylar, but she is back to having panic attacks daily and sill feel short of breath. Denies SI/HI/AVH.    Review of Systems:   Review of Systems   Constitutional:  Negative for appetite change and unexpected weight change.   Eyes:  Negative for visual disturbance.   Respiratory:  Negative for chest tightness and shortness of breath.    Cardiovascular:  Negative for chest pain.   Musculoskeletal:  Negative for gait problem.   Skin:  Negative for rash and wound.   Neurological:  Negative for dizziness, tremors, seizures, weakness and light-headedness.   Psychiatric/Behavioral:  Positive for agitation and sleep disturbance. Negative for behavioral problems, confusion, decreased concentration, dysphoric mood, hallucinations, self-injury and suicidal ideas. The patient is nervous/anxious. The patient is not hyperactive.      Sleep pattern: will lay there 2 hours, gets 4-6 hours, not tired during the day  Appetite: over eating, gained 5  pounds     Screening Scores:   PHQ-9 : 22  EFREM-7 : 20    RISK ASSESSMENT:  Patient denies any thoughts or intent of suicide today. Patient denies any impulsive behavior today.     Medications:     Current Outpatient Medications:     mirtazapine (REMERON) 7.5 MG tablet, Take 1 tablet by mouth Every Night., Disp: 30 tablet, Rfl: 0    Albuterol-Budesonide 90-80 MCG/ACT aerosol, , Disp: , Rfl:     azelastine (OPTIVAR) 0.05 % ophthalmic solution, Administer 1 drop to both eyes Daily As Needed (itches)., Disp: 6 mL, Rfl: 12    buPROPion SR (Wellbutrin SR) 100 MG 12 hr tablet, Take 1 tablet by mouth Daily., Disp: 30 tablet, Rfl: 11    Cariprazine HCl (Vraylar) 1.5 MG capsule capsule, Take 1 capsule by mouth Daily., Disp: 30 capsule, Rfl: 0    carvedilol (Coreg) 6.25 MG tablet, Take 1 tablet by mouth 2 (Two) Times a Day With Meals., Disp: 60 tablet, Rfl: 11    Insulin Glargine (LANTUS SOLOSTAR) 100 UNIT/ML injection pen, Inject 10 Units under the skin into the appropriate area as directed Every Night for 90 days., Disp: 9 mL, Rfl: 1    Insulin Pen Needle 32G X 4 MM misc, Use 1 Application Daily., Disp: 200 each, Rfl: 1    lidocaine (LIDODERM) 5 %, Place 1 patch on the skin as directed by provider Daily. Remove & Discard patch within 12 hours or as directed by MD, Disp: 30 each, Rfl: 11    lisinopril (PRINIVIL,ZESTRIL) 20 MG tablet, Take 1 tablet by mouth Daily. for blood pressure, Disp: 90 tablet, Rfl: 3    metFORMIN ER (GLUCOPHAGE-XR) 500 MG 24 hr tablet, Take 2 tablets by mouth Daily With Breakfast., Disp: 180 tablet, Rfl: 3    pantoprazole (Protonix) 40 MG EC tablet, Take 1 tablet by mouth Daily., Disp: 30 tablet, Rfl: 11    QUEtiapine (SEROquel) 25 MG tablet, Take 1 tablet by mouth Every Night., Disp: 90 tablet, Rfl: 3    Medication Considerations:  BENY reviewed and appropriate.      Allergies:   Allergies   Allergen Reactions    Sulfamethoxazole-Trimethoprim Rash    Keflex [Cephalexin] GI Intolerance    Sulfa  "Antibiotics      Sulfa drugs    Vancomycin Rash       Results Reviewed:   Reviewed most recent lab results     Objective     Physical Exam:  Vital Signs:   Vitals:    04/18/24 1457 04/18/24 1458 04/18/24 1500   BP:   118/64   Pulse:   66   SpO2:   95%   Weight:  66.2 kg (146 lb)    Height: 152.4 cm (60\")       Body mass index is 28.51 kg/m².     Mental Status Exam:   Hygiene:   good  Cooperation:  Cooperative  Eye Contact:  Good  Psychomotor Behavior:  Restless  Affect:  Blunted  Mood: depressed and anxious  Speech:  Normal  Thought Process:  Goal directed and Linear  Thought Content:  Normal  Suicidal:  None  Homicidal:  None  Hallucinations:  None  Delusion:  None  Memory:  Intact  Orientation:  Person, Place, Time, and Situation  Reliability:  good  Insight:  Good  Judgement:  Good  Impulse Control:  Good  Physical/Medical Issues:   see problem list      Assessment / Plan      Visit Diagnosis/Orders Placed This Visit:  Diagnoses and all orders for this visit:    1. Moderate episode of recurrent major depressive disorder (Primary)  -     Cariprazine HCl (Vraylar) 1.5 MG capsule capsule; Take 1 capsule by mouth Daily.  Dispense: 30 capsule; Refill: 0    2. Insomnia, unspecified type  -     mirtazapine (REMERON) 7.5 MG tablet; Take 1 tablet by mouth Every Night.  Dispense: 30 tablet; Refill: 0         Functional Status: Mild impairment     Prognosis: Good with Ongoing Treatment     Impression/Formulation:  Patient appeared alert and oriented.  Patient is voluntarily requesting to continue outpatient psychiatric treatment at [facility].  Patient is receptive to assistance with maintaining a stable lifestyle.  Patient presents with history of     ICD-10-CM ICD-9-CM   1. Moderate episode of recurrent major depressive disorder  F33.1 296.32   2. Insomnia, unspecified type  G47.00 780.52     Reviewed patient's previous provider notes. Reviewed most recent labs. Patient meets DSM V diagnostic " criteria for diagnoses. Diagnoses may be updated as more information becomes available.       Treatment Plan:   Decrease vraylar back down to 1.5 mg daily  Continue mirtazipine 7.5 mg at bedtime and seroquel 25mg at bedtime  Follow up in 1 month or sooner if needed  Patient will continue supportive psychotherapy efforts and medications as indicated. Clinic will obtain release of information for current treatment team for continuity of care as needed. Patient will contact this office, call 911 or present to the nearest emergency room should suicidal or homicidal ideations occur.  Discussed medication options and treatment plan of prescribed medication(s) as well as the risks, benefits, and potential side effects. Patient acknowledged and verbally consented to continue with current treatment plan and was educated on the importance of compliance with treatment and follow-up appointments.     Patient instructions:  All risks/benefits and side effects discussed with patient, including risk for weight gain, increased lipids, hyperprolactemia, EPS symptoms, including restlessness, muscle  stiffness or contraction of head, face, neck, trunk and limbs, and TD (which can be irreversible). Pt verbalizes understanding and consents to treatment with these medications.     Follow Up:   Return in about 1 month (around 5/18/2024) for Med Check.        GAGE Garza, PMHNP-BC [facility]

## 2024-04-19 ENCOUNTER — OFFICE VISIT (OUTPATIENT)
Dept: INTERNAL MEDICINE | Facility: CLINIC | Age: 54
End: 2024-04-19
Payer: MEDICAID

## 2024-04-19 VITALS
SYSTOLIC BLOOD PRESSURE: 126 MMHG | HEART RATE: 71 BPM | BODY MASS INDEX: 29.71 KG/M2 | WEIGHT: 152.13 LBS | DIASTOLIC BLOOD PRESSURE: 80 MMHG | TEMPERATURE: 97.1 F | RESPIRATION RATE: 20 BRPM

## 2024-04-19 DIAGNOSIS — E11.65 TYPE 2 DIABETES MELLITUS WITH HYPERGLYCEMIA, WITHOUT LONG-TERM CURRENT USE OF INSULIN: ICD-10-CM

## 2024-04-19 DIAGNOSIS — Z12.31 BREAST CANCER SCREENING BY MAMMOGRAM: ICD-10-CM

## 2024-04-19 DIAGNOSIS — Z12.11 ENCOUNTER FOR COLORECTAL CANCER SCREENING: ICD-10-CM

## 2024-04-19 DIAGNOSIS — Z12.12 ENCOUNTER FOR COLORECTAL CANCER SCREENING: ICD-10-CM

## 2024-04-19 DIAGNOSIS — I10 PRIMARY HYPERTENSION: Primary | ICD-10-CM

## 2024-04-19 DIAGNOSIS — J44.9 CHRONIC OBSTRUCTIVE PULMONARY DISEASE, UNSPECIFIED COPD TYPE: ICD-10-CM

## 2024-04-19 DIAGNOSIS — K74.60 CIRRHOSIS OF LIVER WITHOUT ASCITES, UNSPECIFIED HEPATIC CIRRHOSIS TYPE: ICD-10-CM

## 2024-04-19 PROBLEM — M81.6 LOCALIZED OSTEOPOROSIS WITHOUT CURRENT PATHOLOGICAL FRACTURE: Status: ACTIVE | Noted: 2024-04-19

## 2024-04-19 LAB
ALBUMIN SERPL-MCNC: 3.5 G/DL (ref 3.5–5.2)
ANION GAP SERPL CALCULATED.3IONS-SCNC: 9 MMOL/L (ref 5–15)
BUN SERPL-MCNC: 10 MG/DL (ref 6–20)
BUN/CREAT SERPL: 12.8 (ref 7–25)
CALCIUM SPEC-SCNC: 8.6 MG/DL (ref 8.6–10.5)
CHLORIDE SERPL-SCNC: 105 MMOL/L (ref 98–107)
CO2 SERPL-SCNC: 23 MMOL/L (ref 22–29)
CREAT SERPL-MCNC: 0.78 MG/DL (ref 0.57–1)
EGFRCR SERPLBLD CKD-EPI 2021: 91.5 ML/MIN/1.73
EXPIRATION DATE: ABNORMAL
GLUCOSE BLDC GLUCOMTR-MCNC: 261 MG/DL (ref 70–130)
GLUCOSE SERPL-MCNC: 256 MG/DL (ref 65–99)
Lab: ABNORMAL
PHOSPHATE SERPL-MCNC: 2.6 MG/DL (ref 2.5–4.5)
POTASSIUM SERPL-SCNC: 4.3 MMOL/L (ref 3.5–5.2)
SODIUM SERPL-SCNC: 137 MMOL/L (ref 136–145)

## 2024-04-19 PROCEDURE — 80069 RENAL FUNCTION PANEL: CPT | Performed by: STUDENT IN AN ORGANIZED HEALTH CARE EDUCATION/TRAINING PROGRAM

## 2024-04-19 RX ORDER — ROSUVASTATIN CALCIUM 10 MG/1
10 TABLET, COATED ORAL DAILY
Qty: 90 TABLET | Refills: 3 | Status: SHIPPED | OUTPATIENT
Start: 2024-04-19

## 2024-04-19 RX ORDER — BLOOD-GLUCOSE,RECEIVER,CONT
1 EACH MISCELLANEOUS ONCE
Qty: 1 EACH | Refills: 0 | Status: SHIPPED | OUTPATIENT
Start: 2024-04-19 | End: 2024-04-19

## 2024-04-19 RX ORDER — BLOOD-GLUCOSE SENSOR
1 EACH MISCELLANEOUS
Qty: 6 EACH | Refills: 6 | Status: SHIPPED | OUTPATIENT
Start: 2024-04-19

## 2024-04-19 RX ORDER — SEMAGLUTIDE 0.68 MG/ML
0.25 INJECTION, SOLUTION SUBCUTANEOUS WEEKLY
Qty: 3 ML | Refills: 0 | Status: SHIPPED | OUTPATIENT
Start: 2024-04-19

## 2024-04-19 NOTE — PROGRESS NOTES
"    Follow Up Office Visit      Date: 2024   Patient Name: Luz Elena Ballard  : 1970   MRN: [id number]     Chief Complaint:    Chief Complaint   Patient presents with    Follow-up     4 week diabetes and hypertension        History of Present Illness: Luz Elena Ballard is a 53 y.o. female with hsitory of hep C related cirrhosis (Child Shay Class A), esophageal varicies, HTN, HLD, [id number] is here today to follow up with the following problems.    HPI  History of Present Illness  The patient presents for evaluation of multiple medical concerns.    The patient expresses a desire to commence Ozempic therapy. She reports experiencing a burning sensation during insulin administration, but adjusts her Lantus dosage from 10 units to 20 units every other night. Her blood glucose levels have been consistently below 200, with the most recent reading at 178. She experienced dizziness upon awakening today, but has not monitored her blood sugar today. She attributes her weight gain to either Vraylar or Wellbutrin, and notes a weight gain of 5 to 6 pounds. She denies any history of pancreatitis or thyroid cancer. She underwent an eye examination with Dr. Leal and Dr. De Leon this year.    The patient's blood pressure readings were within normal limits yesterday, with a slight elevation today. She reports feeling \"swimmy\" at times.    The patient reports increased frequency of shortness of breath, particularly after strenuous activities such as cycling with her grandchildren. She denies any productive cough but reports chronic wheezing. She has not undergone pulmonary function testing in the past. She has been utilizing her albuterol inhaler more frequently.         Subjective      Review of Systems:   Review of Systems    I have reviewed the patients family history, social history, past medical history, past surgical history and have updated it as appropriate.     Medications:     Current Outpatient Medications:     " Albuterol-Budesonide 90-80 MCG/ACT aerosol, , Disp: , Rfl:     azelastine (OPTIVAR) 0.05 % ophthalmic solution, Administer 1 drop to both eyes Daily As Needed (itches)., Disp: 6 mL, Rfl: 12    buPROPion SR (Wellbutrin SR) 100 MG 12 hr tablet, Take 1 tablet by mouth Daily., Disp: 30 tablet, Rfl: 11    Cariprazine HCl (Vraylar) 1.5 MG capsule capsule, Take 1 capsule by mouth Daily., Disp: 30 capsule, Rfl: 0    carvedilol (Coreg) 6.25 MG tablet, Take 1 tablet by mouth 2 (Two) Times a Day With Meals., Disp: 60 tablet, Rfl: 11    Insulin Glargine (LANTUS SOLOSTAR) 100 UNIT/ML injection pen, Inject 10 Units under the skin into the appropriate area as directed Every Night for 90 days., Disp: 9 mL, Rfl: 1    Insulin Pen Needle 32G X 4 MM misc, Use 1 Application Daily., Disp: 200 each, Rfl: 1    lidocaine (LIDODERM) 5 %, Place 1 patch on the skin as directed by provider Daily. Remove & Discard patch within 12 hours or as directed by MD, Disp: 30 each, Rfl: 11    lisinopril (PRINIVIL,ZESTRIL) 20 MG tablet, Take 1 tablet by mouth Daily. for blood pressure, Disp: 90 tablet, Rfl: 3    metFORMIN ER (GLUCOPHAGE-XR) 500 MG 24 hr tablet, Take 2 tablets by mouth Daily With Breakfast., Disp: 180 tablet, Rfl: 3    mirtazapine (REMERON) 7.5 MG tablet, Take 1 tablet by mouth Every Night., Disp: 30 tablet, Rfl: 0    pantoprazole (Protonix) 40 MG EC tablet, Take 1 tablet by mouth Daily., Disp: 30 tablet, Rfl: 11    QUEtiapine (SEROquel) 25 MG tablet, Take 1 tablet by mouth Every Night., Disp: 90 tablet, Rfl: 3    Budeson-Glycopyrrol-Formoterol (BREZTRI) 160-9-4.8 MCG/ACT aerosol inhaler, Inhale 2 puffs 2 (Two) Times a Day., Disp: 10.7 g, Rfl: 11    Continuous Blood Gluc  (FreeStyle Maria C 3 [place]) device, Use 1 kit 1 (One) Time for 1 dose., Disp: 1 each, Rfl: 0    Continuous Blood Gluc Sensor (FreeStyle Maria C 3 Sensor) misc, Use 1 each Every 21 (Twenty-One) Days., Disp: 6 each, Rfl: 6    rosuvastatin (Crestor) 10 MG tablet, Take 1  tablet by mouth Daily., Disp: 90 tablet, Rfl: 3    Semaglutide,0.25 or 0.5MG/DOS, (Ozempic, 0.25 or 0.5 MG/DOSE,) 2 MG/3ML solution pen-injector, Inject 0.25 mg under the skin into the appropriate area as directed 1 (One) Time Per Week., Disp: 3 mL, Rfl: 0    Allergies:   Allergies   Allergen Reactions    Sulfamethoxazole-Trimethoprim Rash    Keflex [Cephalexin] GI Intolerance    Sulfa Antibiotics      Sulfa drugs    Vancomycin Rash       Objective     Physical Exam: Please see above  Vital Signs:   Vitals:    04/19/24 1420 04/19/24 1447   BP: 140/78 126/80   BP Location: Left arm Left arm   Patient Position: Sitting Sitting   Cuff Size: Adult Adult   Pulse: 71    Resp: 20    Temp: 97.1 °F (36.2 °C)    TempSrc: Temporal    Weight: 69 kg (152 lb 2 oz)    PainSc: 0-No pain      Body mass index is 29.71 kg/m².          Physical Exam  Vitals reviewed.   Constitutional:       General: She is not in acute distress.     Appearance: Normal appearance. She is not ill-appearing or toxic-appearing.   HENT:      Head: Normocephalic and atraumatic.      Right Ear: External ear normal.      Left Ear: External ear normal.      Nose: Nose normal. No congestion.      Mouth/Throat:      Mouth: Mucous membranes are moist.      Pharynx: No oropharyngeal exudate or posterior oropharyngeal erythema.   Eyes:      General: No scleral icterus.     Extraocular Movements: Extraocular movements intact.      Pupils: Pupils are equal, round, and reactive to light.   Cardiovascular:      Rate and Rhythm: Normal rate and regular rhythm.      Pulses: Normal pulses.           Dorsalis pedis pulses are 2+ on the right side and 2+ on the left side.        Posterior tibial pulses are 2+ on the right side and 2+ on the left side.      Heart sounds: Normal heart sounds. Murmur (2/6 crescendo decresencdo murmur at RUSB, stable) heard.      No friction rub. No gallop.   Pulmonary:      Effort: Pulmonary effort is normal. No respiratory distress.       Breath sounds: Normal breath sounds. No stridor. No wheezing, rhonchi or rales.   Abdominal:      General: Abdomen is flat. There is no distension.      Palpations: Abdomen is soft.   Musculoskeletal:         General: No swelling or tenderness. Normal range of motion.      Cervical back: Normal range of motion. No rigidity.   Feet:      Right foot:      Protective Sensation: 10 sites tested.  10 sites sensed.      Skin integrity: Skin integrity normal.      Toenail Condition: Right toenails are normal.      Left foot:      Protective Sensation: 10 sites tested.  9 sites sensed.      Skin integrity: Skin integrity normal.      Toenail Condition: Left toenails are normal.   Skin:     General: Skin is warm and dry.      Capillary Refill: Capillary refill takes less than 2 seconds.      Findings: No erythema or rash.   Neurological:      General: No focal deficit present.      Mental Status: She is alert and oriented to person, place, and time.   Psychiatric:         Mood and Affect: Mood normal.         Behavior: Behavior normal.         Judgment: Judgment normal.       Physical Exam  Vital Signs  The patient's blood pressure is 124/80.      Procedures    Results:   Labs:   Hemoglobin A1C   Date Value Ref Range Status   02/15/2024 9.50 (H) 4.80 - 5.60 % Final     TSH   Date Value Ref Range Status   02/15/2024 0.582 0.270 - 4.200 uIU/mL Final      Results  Laboratory Studies  Alpha-fetoprotein tumor marker was 5.66 in February. Cholesterol levels improved compared to 7 years ago.      Imaging:   No valid procedures specified.     Assessment / Plan      Assessment/Plan:   Problem List Items Addressed This Visit       Cirrhosis of liver without ascites    Overview     - Child-Shay score 2/16/24: 6 points, Class A, MELD-Na 21 points, 7-10% estimated 90day mortality         Type 2 diabetes mellitus with hyperglycemia, without long-term current use of insulin    Relevant Medications    rosuvastatin (Crestor) 10 MG tablet     Semaglutide,0.25 or 0.5MG/DOS, (Ozempic, 0.25 or 0.5 MG/DOSE,) 2 MG/3ML solution pen-injector    Continuous Blood Gluc  (FreeStyle Maria C 3 [place]) device    Continuous Blood Gluc Sensor (FreeStyle Maria C 3 Sensor) misc    Other Relevant Orders    POC Glucose (Completed)     Other Visit Diagnoses       Primary hypertension    -  Primary    Relevant Orders    Renal Function Panel    Breast cancer screening by mammogram        Relevant Orders    Mammo Screening Digital Tomosynthesis Bilateral With CAD    Chronic obstructive pulmonary disease, unspecified COPD type        Relevant Medications    Budeson-Glycopyrrol-Formoterol (BREZTRI) 160-9-4.8 MCG/ACT aerosol inhaler    Other Relevant Orders    Complete PFT - Pre & Post Bronchodilator    Encounter for colorectal cancer screening        Relevant Orders    Ambulatory Referral For Screening Colonoscopy            Assessment & Plan  1. Diabetes Mellitus.  A point-of-care glucose test will be conducted today. The patient will maintain her current dosage of Lantus at 10 u nightly. WE will start ozempic 0.25mg weekly, discussed side effects benefits and risks. She will continue to monitor her fasting blood glucose levels every morning.  If fasting blood glucose levels are consistently below 80 for several days, her Lantus dosage will further reduced by 2 units, repeat this every 3 days. The potential side effects of Ozempic were thoroughly discussed with the patient. She has been advised to reduce her portion sizes by approximately 50 percent when she first commences Ozempic. If hunger persists, she should increase her food intake. We will prescribe a CGM (freestyle maria c). She has been advised to keep her Accu-Chek readily available for confirmation. A diabetic foot exam was performed today and normal.    2. Dyspnea.  A prescription for Breztri has been issued. Pulmonary function tests (PFTs) will be ordered. The patient has been advised to cease vaping, denies need  for any medications or nicotine replacement. If no improvement with Breztri, will do repeat echo to rule out symptomatic AS.    3. Health maintenance.  The patient's alpha-fetoprotein tumor marker was 5.66 in 02/2024. Her cholesterol levels have significantly improved compared to 7 years ago. A referral for a colonoscopy has been made. A mammogram has been ordered. A daily statin medication has been prescribed.    Follow-up  The patient is scheduled for a follow-up visit in 3 months for diabetes and cholesterol management, with fasting labs to be conducted at that time.       Follow Up:   Return in about 3 months (around 7/19/2024) for Recheck diabetes, cholesterol fasting labs.    I spent 43 minutes caring for Luz Elena on this date of service. This time includes time spent by me in the following activities: preparing for the visit, reviewing tests, performing a medically appropriate examination and/or evaluation, counseling and educating the patient/family/caregiver, ordering medications, tests, or procedures, referring and communicating with other health care professionals, and documenting information in the medical record        Sal Astorga MD   [facility] Solor[facility]ano    Patient or patient representative verbalized consent for the use of Ambient Listening during the visit with  Sal Astorga MD for chart documentation. 4/19/2024  17:19 EDT

## 2024-04-19 NOTE — Clinical Note
Kezia León,  I saw Luz Elena in clinic today and she stated she was waiting on her Hep C treatment due to her AFP not being checked. Wanted to let you know that we had this done on 2/15/24 and it was 5.66 in normal range. Also trying to get her to schedule her colonoscopy with you. If you could have your staff confirm that she is good to start hep c treatment that would be great. Thanks for your help in caring for her.  Best, Migel Astorga

## 2024-04-22 ENCOUNTER — TELEPHONE (OUTPATIENT)
Dept: INTERNAL MEDICINE | Facility: CLINIC | Age: 54
End: 2024-04-22
Payer: MEDICAID

## 2024-04-22 DIAGNOSIS — R11.2 NAUSEA AND VOMITING, UNSPECIFIED VOMITING TYPE: Primary | ICD-10-CM

## 2024-04-22 RX ORDER — ONDANSETRON 4 MG/1
4 TABLET, ORALLY DISINTEGRATING ORAL EVERY 8 HOURS PRN
Qty: 30 TABLET | Refills: 3 | Status: SHIPPED | OUTPATIENT
Start: 2024-04-22

## 2024-04-22 NOTE — TELEPHONE ENCOUNTER
Attempted to call patient, left voice message for patient to return call.     RELAY:Zofran sent to pharmacy.     DR. Astorga

## 2024-04-22 NOTE — TELEPHONE ENCOUNTER
Caller: Luz Elena Ballard    Relationship: Self    Best call back number: [phone]     What medication are you requesting: ZOFRAN    What are your current symptoms: NAUSEA-VOMITING-DIARRHEA    How long have you been experiencing symptoms: 4 DAYS    Have you had these symptoms before:    [] Yes  [x] No    Have you been treated for these symptoms before:   [] Yes  [x] No    If a prescription is needed, what is your preferred pharmacy and phone number: [id number] - [facility] [address] AT [facility] [address] - [phone] [facility] [phone]      Additional notes: PATIENT STATES THAT THESE SYMPTOMS STARTED FROM OZEMPIC

## 2024-04-22 NOTE — TELEPHONE ENCOUNTER
Name: Luz Elena Ballard      Relationship: Self      Best Callback Number: [phone]       HUB PROVIDED THE RELAY MESSAGE FROM THE OFFICE      PATIENT: HAS FURTHER QUESTIONS AND WOULD LIKE A CALL BACK AT THE FOLLOWING PHONE NUMBER     ADDITIONAL INFORMATION: PATIENT HAD QUESTIONS ABOUT THE OZEMPIC AND SHE WAS WARM TRANSFERRED TO OFFICE

## 2024-04-22 NOTE — TELEPHONE ENCOUNTER
Name: MARK ELIAS      Relationship:       Best Callback Number: [phone]       HUB PROVIDED THE RELAY MESSAGE FROM THE OFFICE      PATIENT: VOICED UNDERSTANDING AND HAS NO FURTHER QUESTIONS AT THIS TIME    ADDITIONAL INFORMATION:

## 2024-04-23 ENCOUNTER — TELEPHONE (OUTPATIENT)
Dept: GASTROENTEROLOGY | Facility: CLINIC | Age: 54
End: 2024-04-23
Payer: MEDICAID

## 2024-04-23 DIAGNOSIS — B18.2 CHRONIC HEPATITIS C WITHOUT HEPATIC COMA: Primary | ICD-10-CM

## 2024-04-23 NOTE — TELEPHONE ENCOUNTER
CALLED PT TO SCHEDULE COLONOSCOPY PER DR. LUNA'S REQUEST. PT STATED THAT SHE DID NOT WANT TO SCHEDULE PROCEDURE AT THIS TIME.

## 2024-04-23 NOTE — TELEPHONE ENCOUNTER
Called patient to get more information, patient stated she spoke to another nurse yesterday and got the information that was needed. Good verb given.

## 2024-04-24 RX ORDER — VELPATASVIR AND SOFOSBUVIR 100; 400 MG/1; MG/1
1 TABLET, FILM COATED ORAL DAILY
Qty: 28 TABLET | Refills: 2 | Status: SHIPPED | OUTPATIENT
Start: 2024-04-24 | End: 2024-04-29 | Stop reason: SDUPTHER

## 2024-04-29 RX ORDER — VELPATASVIR AND SOFOSBUVIR 100; 400 MG/1; MG/1
1 TABLET, FILM COATED ORAL DAILY
Qty: 28 TABLET | Refills: 5 | Status: SHIPPED | OUTPATIENT
Start: 2024-04-29 | End: 2024-05-02 | Stop reason: SDUPTHER

## 2024-04-30 ENCOUNTER — TELEPHONE (OUTPATIENT)
Dept: INTERNAL MEDICINE | Facility: CLINIC | Age: 54
End: 2024-04-30
Payer: MEDICAID

## 2024-04-30 ENCOUNTER — PATIENT MESSAGE (OUTPATIENT)
Dept: INTERNAL MEDICINE | Facility: CLINIC | Age: 54
End: 2024-04-30
Payer: MEDICAID

## 2024-04-30 ENCOUNTER — LAB (OUTPATIENT)
Dept: LAB | Facility: HOSPITAL | Age: 54
End: 2024-04-30
Payer: MEDICAID

## 2024-04-30 DIAGNOSIS — B18.2 CHRONIC HEPATITIS C WITHOUT HEPATIC COMA: ICD-10-CM

## 2024-04-30 PROCEDURE — 87522 HEPATITIS C REVRS TRNSCRPJ: CPT

## 2024-04-30 PROCEDURE — 87902 NFCT AGT GNTYP ALYS HEP C: CPT

## 2024-04-30 NOTE — TELEPHONE ENCOUNTER
Patient has labs order by Dr Rogers. Patient states they can't get her at the lab at the [facility]. Patient request Dr Astorga to place order. She states it's a standing order. The lab is a HCV RNA by PCR

## 2024-04-30 NOTE — TELEPHONE ENCOUNTER
From: Luz Elena Ballard  To: Sal Astorga  Sent: 4/30/2024 3:55 PM EDT  Subject: Labs for [place]     My labs were successfully drawn in the main Lab at [facility] so you can disregard the message that I put in about you placing an order for them so I could have them drawn in your office thank you for your cooperation

## 2024-05-02 RX ORDER — VELPATASVIR AND SOFOSBUVIR 100; 400 MG/1; MG/1
1 TABLET, FILM COATED ORAL DAILY
Qty: 28 TABLET | Refills: 5 | Status: SHIPPED | OUTPATIENT
Start: 2024-05-02

## 2024-05-05 LAB
HCV GENTYP SERPL NAA+PROBE: NORMAL
HCV GENTYP SERPL NAA+PROBE: NORMAL
HCV RNA SERPL NAA+PROBE-ACNC: NORMAL IU/ML
HCV RNA SERPL NAA+PROBE-LOG IU: 6.87 LOG10 IU/ML
LABORATORY COMMENT REPORT: NORMAL

## 2024-05-06 ENCOUNTER — PATIENT MESSAGE (OUTPATIENT)
Dept: INTERNAL MEDICINE | Facility: CLINIC | Age: 54
End: 2024-05-06
Payer: MEDICAID

## 2024-05-07 ENCOUNTER — HOSPITAL ENCOUNTER (OUTPATIENT)
Dept: PULMONOLOGY | Facility: HOSPITAL | Age: 54
Discharge: HOME | End: 2024-05-07
Admitting: STUDENT IN AN ORGANIZED HEALTH CARE EDUCATION/TRAINING PROGRAM
Payer: MEDICAID

## 2024-05-07 DIAGNOSIS — J44.9 CHRONIC OBSTRUCTIVE PULMONARY DISEASE, UNSPECIFIED COPD TYPE: ICD-10-CM

## 2024-05-07 PROCEDURE — 94726 PLETHYSMOGRAPHY LUNG VOLUMES: CPT

## 2024-05-07 PROCEDURE — 94729 DIFFUSING CAPACITY: CPT

## 2024-05-07 PROCEDURE — 94010 BREATHING CAPACITY TEST: CPT

## 2024-05-08 ENCOUNTER — TELEPHONE (OUTPATIENT)
Dept: INTERNAL MEDICINE | Facility: CLINIC | Age: 54
End: 2024-05-08
Payer: MEDICAID

## 2024-05-09 ENCOUNTER — OFFICE VISIT (OUTPATIENT)
Dept: BEHAVIORAL HEALTH | Facility: CLINIC | Age: 54
End: 2024-05-09
Payer: MEDICAID

## 2024-05-09 VITALS
OXYGEN SATURATION: 96 % | WEIGHT: 147 LBS | BODY MASS INDEX: 28.86 KG/M2 | DIASTOLIC BLOOD PRESSURE: 72 MMHG | HEIGHT: 60 IN | HEART RATE: 64 BPM | SYSTOLIC BLOOD PRESSURE: 126 MMHG

## 2024-05-09 DIAGNOSIS — G47.00 INSOMNIA, UNSPECIFIED TYPE: ICD-10-CM

## 2024-05-09 DIAGNOSIS — F33.1 MODERATE EPISODE OF RECURRENT MAJOR DEPRESSIVE DISORDER: Primary | ICD-10-CM

## 2024-05-09 DIAGNOSIS — F41.1 GAD (GENERALIZED ANXIETY DISORDER): Chronic | ICD-10-CM

## 2024-05-09 RX ORDER — LAMOTRIGINE 25 MG/1
TABLET ORAL
Qty: 45 TABLET | Refills: 0 | Status: SHIPPED | OUTPATIENT
Start: 2024-05-09

## 2024-05-09 RX ORDER — MIRTAZAPINE 7.5 MG/1
7.5 TABLET, FILM COATED ORAL NIGHTLY
Qty: 30 TABLET | Refills: 2 | Status: SHIPPED | OUTPATIENT
Start: 2024-05-09

## 2024-05-10 DIAGNOSIS — E11.65 TYPE 2 DIABETES MELLITUS WITH HYPERGLYCEMIA, WITHOUT LONG-TERM CURRENT USE OF INSULIN: ICD-10-CM

## 2024-05-10 RX ORDER — SEMAGLUTIDE 0.68 MG/ML
INJECTION, SOLUTION SUBCUTANEOUS
Qty: 3 ML | Refills: 0 | Status: SHIPPED | OUTPATIENT
Start: 2024-05-10 | End: 2024-05-10 | Stop reason: SDUPTHER

## 2024-05-10 RX ORDER — SEMAGLUTIDE 0.68 MG/ML
INJECTION, SOLUTION SUBCUTANEOUS
Qty: 3 ML | Refills: 0 | OUTPATIENT
Start: 2024-05-10

## 2024-05-10 RX ORDER — SEMAGLUTIDE 0.68 MG/ML
0.25 INJECTION, SOLUTION SUBCUTANEOUS WEEKLY
Qty: 3 ML | Refills: 0 | Status: SHIPPED | OUTPATIENT
Start: 2024-05-10

## 2024-05-13 ENCOUNTER — DOCUMENTATION (OUTPATIENT)
Dept: GASTROENTEROLOGY | Facility: CLINIC | Age: 54
End: 2024-05-13
Payer: MEDICAID

## 2024-05-13 NOTE — PROGRESS NOTES
Rec'd call from patient stating that Epclusa PA was denied,  due to needing additional information. Called & spoke with Masood Dumont, copy of denial letter, labs, & additional office notes uploaded to portal acct. They will review records, collect information & submit an appeal. Will follow up once decision as been made.

## 2024-05-16 NOTE — PROGRESS NOTES
Patient lvm regarding an update on epclusa. I advised her that her information was upload to [employer] portal for an appeal. She is very concerned that she is not able to have this medication and would like a follow up call asap.

## 2024-05-17 ENCOUNTER — TELEPHONE (OUTPATIENT)
Dept: GASTROENTEROLOGY | Facility: CLINIC | Age: 54
End: 2024-05-17
Payer: MEDICAID

## 2024-05-17 NOTE — TELEPHONE ENCOUNTER
Called lm for pt, notified patient [employer] is currently in the appeal process. Julia is working on it, but it does not take some time. Notified patient any issues with getting medication we will contact her. Julia will reach out to her once they have completed all the insurance & approval process to set up Welcome call & schedule delivery date, & we will contact her once medication is rec'd in office.

## 2024-06-06 DIAGNOSIS — B18.2 CHRONIC HEPATITIS C WITHOUT HEPATIC COMA: Primary | ICD-10-CM

## 2024-06-10 DIAGNOSIS — F33.1 MODERATE EPISODE OF RECURRENT MAJOR DEPRESSIVE DISORDER: ICD-10-CM

## 2024-06-10 RX ORDER — LAMOTRIGINE 25 MG/1
TABLET ORAL
Qty: 45 TABLET | Refills: 0 | OUTPATIENT
Start: 2024-06-10

## 2024-06-17 DIAGNOSIS — F33.1 MODERATE EPISODE OF RECURRENT MAJOR DEPRESSIVE DISORDER: ICD-10-CM

## 2024-06-17 RX ORDER — LAMOTRIGINE 25 MG/1
50 TABLET ORAL DAILY
Qty: 60 TABLET | Refills: 0 | Status: SHIPPED | OUTPATIENT
Start: 2024-06-17 | End: 2024-06-19

## 2024-06-17 NOTE — TELEPHONE ENCOUNTER
Pt is requesting refill of lamotrigine 25mg sent to the [facility] on [address]. Pt has an appt scheduled on 6/19.    Phone # [phone]

## 2024-06-18 LAB
NCCN CRITERIA FLAG: NORMAL
TYRER CUZICK SCORE: 4.8

## 2024-06-19 ENCOUNTER — OFFICE VISIT (OUTPATIENT)
Dept: BEHAVIORAL HEALTH | Facility: CLINIC | Age: 54
End: 2024-06-19
Payer: MEDICAID

## 2024-06-19 VITALS
HEIGHT: 60 IN | SYSTOLIC BLOOD PRESSURE: 138 MMHG | BODY MASS INDEX: 29.25 KG/M2 | OXYGEN SATURATION: 98 % | DIASTOLIC BLOOD PRESSURE: 84 MMHG | HEART RATE: 74 BPM | WEIGHT: 149 LBS

## 2024-06-19 DIAGNOSIS — F33.1 MODERATE EPISODE OF RECURRENT MAJOR DEPRESSIVE DISORDER: Primary | ICD-10-CM

## 2024-06-19 PROCEDURE — 3075F SYST BP GE 130 - 139MM HG: CPT | Performed by: REGISTERED NURSE

## 2024-06-19 PROCEDURE — 1159F MED LIST DOCD IN RCRD: CPT | Performed by: REGISTERED NURSE

## 2024-06-19 PROCEDURE — 3079F DIAST BP 80-89 MM HG: CPT | Performed by: REGISTERED NURSE

## 2024-06-19 PROCEDURE — 99214 OFFICE O/P EST MOD 30 MIN: CPT | Performed by: REGISTERED NURSE

## 2024-06-19 PROCEDURE — 1160F RVW MEDS BY RX/DR IN RCRD: CPT | Performed by: REGISTERED NURSE

## 2024-06-19 NOTE — PROGRESS NOTES
Follow Up Office Visit      Patient Name: Luz Elena Ballard  : 1970   MRN: [id number]     Referring Provider: Sal Astorga MD    Chief Complaint:      ICD-10-CM ICD-9-CM   1. Moderate episode of recurrent major depressive disorder  F33.1 296.32        History of Present Illness:   Luz Elena Ballard is a 54 y.o. female who is here today for follow up and medication management    Subjective      Patient Reports: that she did not increase the lamotrigine to 50 mg. She reports that she continued the 25 mg for one month and has been out for 2 days. She reports that she did not notice any side effects, but also did not notice any benefit. She reports that she would like to go back on the vraylar, even though she had restlessness and increased anxiety. She reports that if she is unable to go to sleep at night, she gets worried and feels like she hears and see things. Denies SI/HI/AVH    Review of Systems:   Review of Systems   Constitutional:  Negative for appetite change and unexpected weight change.   Eyes:  Negative for visual disturbance.   Respiratory:  Negative for chest tightness.    Musculoskeletal:  Negative for gait problem.   Skin:  Negative for rash and wound.   Neurological:  Negative for tremors, seizures, weakness and light-headedness.   Psychiatric/Behavioral:  Positive for dysphoric mood and sleep disturbance. Negative for agitation, behavioral problems, decreased concentration, hallucinations, self-injury and suicidal ideas. The patient is nervous/anxious. The patient is not hyperactive.      Sleep pattern: falls asleep in 30 minutes, staying asleep, about 5-6 hours, not tired during the day  Appetite: no changes     PHQ-9 Depression Screening  Little interest or pleasure in doing things? 3-->nearly every day   Feeling down, depressed, or hopeless? 3-->nearly every day   Trouble falling or staying asleep, or sleeping too much? 3-->nearly every day   Feeling tired or having little energy?  3-->nearly every day   Poor appetite or overeating? 3-->nearly every day   Feeling bad about yourself - or that you are a failure or have let yourself or your family down? 3-->nearly every day   Trouble concentrating on things, such as reading the newspaper or watching television? 3-->nearly every day   Moving or speaking so slowly that other people could have noticed? Or the opposite - being so fidgety or restless that you have been moving around a lot more than usual? 3-->nearly every day   Thoughts that you would be better off dead, or of hurting yourself in some way? 0-->not at all   PHQ-9 Total Score 24   If you checked off any problems, how difficult have these problems made it for you to do your work, take care of things at home, or get along with other people? extremely difficult     EFREM-7 Anxiety Screening  Over the last two weeks, how often have you been bothered by the following problems?  Feeling nervous, anxious or on edge: Nearly every day  Not being able to stop or control worrying: Nearly every day  Worrying too much about different things: Nearly every day  Trouble Relaxing: Nearly every day  Being so restless that it is hard to sit still: Nearly every day  Becoming easily annoyed or irritable: Nearly every day  Feeling afraid as if something awful might happen: More than half the days  EFREM 7 Total Score: 20  If you checked any problems, how difficult have these problems made it for you to do your work, take care of things at home, or get along with other people: Extremely difficult    RISK ASSESSMENT:  Patient denies any thoughts or intent of suicide today. Patient denies any impulsive behavior today.     Medications:     Current Outpatient Medications:     lamoTRIgine (LaMICtal) 25 MG tablet, Take 2 tablets by mouth Daily., Disp: 60 tablet, Rfl: 0    azelastine (OPTIVAR) 0.05 % ophthalmic solution, Administer 1 drop to both eyes Daily As Needed (itches)., Disp: 6 mL, Rfl: 12     Budeson-Glycopyrrol-Formoterol (BREZTRI) 160-9-4.8 MCG/ACT aerosol inhaler, Inhale 2 puffs 2 (Two) Times a Day., Disp: 10.7 g, Rfl: 11    buPROPion SR (Wellbutrin SR) 100 MG 12 hr tablet, Take 1 tablet by mouth Daily., Disp: 30 tablet, Rfl: 11    Cariprazine HCl (Vraylar) 1.5 MG capsule capsule, Take 1 capsule by mouth Daily., Disp: 14 capsule, Rfl: 0    carvedilol (Coreg) 6.25 MG tablet, Take 1 tablet by mouth 2 (Two) Times a Day With Meals., Disp: 60 tablet, Rfl: 11    Continuous Blood Gluc Sensor (FreeStyle Maria C 3 Sensor) misc, Use 1 each Every 21 (Twenty-One) Days., Disp: 6 each, Rfl: 6    Insulin Glargine (LANTUS SOLOSTAR) 100 UNIT/ML injection pen, Inject 10 Units under the skin into the appropriate area as directed Every Night for 90 days., Disp: 9 mL, Rfl: 1    Insulin Pen Needle 32G X 4 MM misc, Use 1 Application Daily., Disp: 200 each, Rfl: 1    lidocaine (LIDODERM) 5 %, Place 1 patch on the skin as directed by provider Daily. Remove & Discard patch within 12 hours or as directed by MD, Disp: 30 each, Rfl: 11    lisinopril (PRINIVIL,ZESTRIL) 20 MG tablet, Take 1 tablet by mouth Daily. for blood pressure, Disp: 90 tablet, Rfl: 3    metFORMIN ER (GLUCOPHAGE-XR) 500 MG 24 hr tablet, Take 2 tablets by mouth Daily With Breakfast., Disp: 180 tablet, Rfl: 3    mirtazapine (REMERON) 7.5 MG tablet, Take 1 tablet by mouth Every Night., Disp: 30 tablet, Rfl: 2    ondansetron ODT (ZOFRAN-ODT) 4 MG disintegrating tablet, Place 1 tablet on the tongue Every 8 (Eight) Hours As Needed for Vomiting or Nausea., Disp: 30 tablet, Rfl: 3    pantoprazole (Protonix) 40 MG EC tablet, Take 1 tablet by mouth Daily., Disp: 30 tablet, Rfl: 11    QUEtiapine (SEROquel) 25 MG tablet, Take 1 tablet by mouth Every Night., Disp: 90 tablet, Rfl: 3    rosuvastatin (Crestor) 10 MG tablet, Take 1 tablet by mouth Daily., Disp: 90 tablet, Rfl: 3    Sofosbuvir-Velpatasvir (Epclusa) 400-100 MG tablet, Take 1 tablet by mouth Daily., Disp: 28  "tablet, Rfl: 5    Medication Considerations:  BENY reviewed and appropriate.      Allergies:   Allergies   Allergen Reactions    Sulfamethoxazole-Trimethoprim Rash    Keflex [Cephalexin] GI Intolerance    Sulfa Antibiotics      Sulfa drugs    Vancomycin Rash       Objective     Physical Exam:  Vital Signs:   Vitals:    06/19/24 1534 06/19/24 1537   BP:  138/84   Pulse:  74   SpO2:  98%   Weight: 67.6 kg (149 lb)    Height: 152.4 cm (60\")      Body mass index is 29.1 kg/m².     Mental Status Exam:   Hygiene:   good  Cooperation:  Guarded  Eye Contact:  Fair  Psychomotor Behavior:  Appropriate  Affect:  Blunted  Mood: depressed  Speech:  Normal  Thought Process:  Goal directed and Linear  Thought Content:  Normal  Suicidal:  None  Homicidal:  None  Hallucinations:  None  Delusion:  None  Memory:  Intact  Orientation:  Person, Place, Time, and Situation  Reliability:  good  Insight:  Fair  Judgement:  Good  Impulse Control:  Fair  Physical/Medical Issues:   see problem list      Assessment / Plan      Visit Diagnosis/Orders Placed This Visit:  Diagnoses and all orders for this visit:    1. Moderate episode of recurrent major depressive disorder (Primary)  -     Cariprazine HCl (Vraylar) 1.5 MG capsule capsule; Take 1 capsule by mouth Daily.  Dispense: 14 capsule; Refill: 0         Functional Status: Mild impairment     Prognosis: Good with Ongoing Treatment     Impression/Formulation:  Patient appeared alert and oriented.  Patient is voluntarily requesting to continue outpatient psychiatric treatment at [facility].  Patient is receptive to assistance with maintaining a stable lifestyle.  Patient presents with history of     ICD-10-CM ICD-9-CM   1. Moderate episode of recurrent major depressive disorder  F33.1 296.32     Reviewed patient's previous provider notes. Reviewed most recent labs. Patient meets DSM V diagnostic criteria for diagnoses. Diagnoses may be updated as more information " becomes available.       Treatment Plan:   Discontinue lamotrigine  Resume vraylar 1.5 mg ever other day  Continue seroquel 25 mg at bedtime  Continue mirtazipine 7.5 mg at bedtime  Follow up in 1 month or sooner if needed  Patient will continue supportive psychotherapy efforts and medications as indicated. Clinic will obtain release of information for current treatment team for continuity of care as needed. Patient will contact this office, call 911 or present to the nearest emergency room should suicidal or homicidal ideations occur.  Discussed medication options and treatment plan of prescribed medication(s) as well as the risks, benefits, and potential side effects. Patient acknowledged and verbally consented to continue with current treatment plan and was educated on the importance of compliance with treatment and follow-up appointments.     Patient instructions:  All risks/benefits and side effects discussed with patient, including risk for weight gain, increased lipids, hyperprolactemia, EPS symptoms, including restlessness, muscle  stiffness or contraction of head, face, neck, trunk and limbs, and TD (which can be irreversible). Pt verbalizes understanding and consents to treatment with these medications.     Follow Up:   Return in about 1 month (around 7/19/2024) for Med Check.        GAGE Garza, PMHNP-BC [facility]

## 2024-06-28 ENCOUNTER — TELEPHONE (OUTPATIENT)
Dept: GASTROENTEROLOGY | Facility: CLINIC | Age: 54
End: 2024-06-28
Payer: MEDICAID

## 2024-07-11 DIAGNOSIS — F33.1 MODERATE EPISODE OF RECURRENT MAJOR DEPRESSIVE DISORDER: ICD-10-CM

## 2024-07-11 RX ORDER — CARIPRAZINE 1.5 MG/1
1.5 CAPSULE, GELATIN COATED ORAL DAILY
Qty: 30 CAPSULE | OUTPATIENT
Start: 2024-07-11

## 2024-07-16 ENCOUNTER — OFFICE VISIT (OUTPATIENT)
Dept: BEHAVIORAL HEALTH | Facility: CLINIC | Age: 54
End: 2024-07-16
Payer: MEDICAID

## 2024-07-16 VITALS
OXYGEN SATURATION: 96 % | BODY MASS INDEX: 29.49 KG/M2 | HEIGHT: 60 IN | SYSTOLIC BLOOD PRESSURE: 136 MMHG | HEART RATE: 61 BPM | WEIGHT: 150.2 LBS | DIASTOLIC BLOOD PRESSURE: 74 MMHG

## 2024-07-16 DIAGNOSIS — F33.1 MODERATE EPISODE OF RECURRENT MAJOR DEPRESSIVE DISORDER: ICD-10-CM

## 2024-07-16 DIAGNOSIS — G47.00 INSOMNIA, UNSPECIFIED TYPE: ICD-10-CM

## 2024-07-16 PROCEDURE — 1159F MED LIST DOCD IN RCRD: CPT | Performed by: REGISTERED NURSE

## 2024-07-16 PROCEDURE — 3078F DIAST BP <80 MM HG: CPT | Performed by: REGISTERED NURSE

## 2024-07-16 PROCEDURE — 1160F RVW MEDS BY RX/DR IN RCRD: CPT | Performed by: REGISTERED NURSE

## 2024-07-16 PROCEDURE — 3075F SYST BP GE 130 - 139MM HG: CPT | Performed by: REGISTERED NURSE

## 2024-07-16 PROCEDURE — 99214 OFFICE O/P EST MOD 30 MIN: CPT | Performed by: REGISTERED NURSE

## 2024-07-16 RX ORDER — MIRTAZAPINE 7.5 MG/1
7.5 TABLET, FILM COATED ORAL NIGHTLY
Qty: 30 TABLET | Refills: 2 | Status: SHIPPED | OUTPATIENT
Start: 2024-07-16

## 2024-07-16 RX ORDER — BUPROPION HYDROCHLORIDE 150 MG/1
150 TABLET ORAL EVERY MORNING
Qty: 30 TABLET | Refills: 0 | Status: SHIPPED | OUTPATIENT
Start: 2024-07-16

## 2024-07-16 NOTE — PROGRESS NOTES
Follow Up Office Visit      Patient Name: Luz Elena Ballard  : 1970   MRN: [id number]     Referring Provider: Sal Astorga MD    Chief Complaint:      ICD-10-CM ICD-9-CM   1. Moderate episode of recurrent major depressive disorder  F33.1 296.32   2. Insomnia, unspecified type  G47.00 780.52        History of Present Illness:   Luz Elena Ballard is a 54 y.o. female who is here today for follow up and medication management    Subjective      Patient Reports: that she went back to [facility] every day. She reports that she feels like things are a little better, but she still gets anxiety really bad, and can't focus, and can't take on more than one thing at a time and then cries or gets mad. She reports that she is not getting along with her sister at the moment. Denies side effects. Denies SI/HI/AVH    Review of Systems:   Review of Systems   Constitutional:  Negative for appetite change and unexpected weight change.   Eyes:  Negative for visual disturbance.   Respiratory:  Positive for shortness of breath. Negative for chest tightness.    Cardiovascular:  Positive for chest pain and palpitations.   Gastrointestinal:  Positive for nausea.   Musculoskeletal:  Negative for gait problem.   Skin:  Negative for rash and wound.   Neurological:  Positive for dizziness. Negative for tremors, seizures, weakness and light-headedness.   Psychiatric/Behavioral:  Positive for decreased concentration, dysphoric mood and sleep disturbance. Negative for agitation, behavioral problems, hallucinations, self-injury and suicidal ideas. The patient is nervous/anxious. The patient is not hyperactive.      Sleep pattern: falling asleep fine 6-8 hours, tired during the day  Appetite: eating more, just came off ozempic     PHQ-9 Depression Screening  Little interest or pleasure in doing things? 2-->more than half the days   Feeling down, depressed, or hopeless? 2-->more than half the days   Trouble falling or staying asleep, or  sleeping too much? 1-->several days   Feeling tired or having little energy? 3-->nearly every day   Poor appetite or overeating? 3-->nearly every day   Feeling bad about yourself - or that you are a failure or have let yourself or your family down? 1-->several days   Trouble concentrating on things, such as reading the newspaper or watching television? 3-->nearly every day   Moving or speaking so slowly that other people could have noticed? Or the opposite - being so fidgety or restless that you have been moving around a lot more than usual? 3-->nearly every day   Thoughts that you would be better off dead, or of hurting yourself in some way? 0-->not at all   PHQ-9 Total Score 18   If you checked off any problems, how difficult have these problems made it for you to do your work, take care of things at home, or get along with other people? extremely difficult     EFREM-7 Anxiety Screening  Over the last two weeks, how often have you been bothered by the following problems?  Feeling nervous, anxious or on edge: Nearly every day  Not being able to stop or control worrying: Nearly every day  Worrying too much about different things: Nearly every day  Trouble Relaxing: Nearly every day  Being so restless that it is hard to sit still: Nearly every day  Becoming easily annoyed or irritable: Nearly every day  Feeling afraid as if something awful might happen: Several days  EFREM 7 Total Score: 19  If you checked any problems, how difficult have these problems made it for you to do your work, take care of things at home, or get along with other people: Extremely difficult    RISK ASSESSMENT:  Patient denies any thoughts or intent of suicide today. Patient denies any impulsive behavior today.     Medications:     Current Outpatient Medications:     azelastine (OPTIVAR) 0.05 % ophthalmic solution, Administer 1 drop to both eyes Daily As Needed (itches)., Disp: 6 mL, Rfl: 12    Budeson-Glycopyrrol-Formoterol (BREZTRI) 160-9-4.8  MCG/ACT aerosol inhaler, Inhale 2 puffs 2 (Two) Times a Day., Disp: 10.7 g, Rfl: 11    Cariprazine HCl (Vraylar) 1.5 MG capsule capsule, Take 1 capsule by mouth Daily., Disp: 30 capsule, Rfl: 0    carvedilol (Coreg) 6.25 MG tablet, Take 1 tablet by mouth 2 (Two) Times a Day With Meals., Disp: 60 tablet, Rfl: 11    Continuous Blood Gluc Sensor (FreeStyle Maria C 3 Sensor) misc, Use 1 each Every 21 (Twenty-One) Days., Disp: 6 each, Rfl: 6    Insulin Pen Needle 32G X 4 MM misc, Use 1 Application Daily., Disp: 200 each, Rfl: 1    lidocaine (LIDODERM) 5 %, Place 1 patch on the skin as directed by provider Daily. Remove & Discard patch within 12 hours or as directed by MD, Disp: 30 each, Rfl: 11    lisinopril (PRINIVIL,ZESTRIL) 20 MG tablet, Take 1 tablet by mouth Daily. for blood pressure, Disp: 90 tablet, Rfl: 3    metFORMIN ER (GLUCOPHAGE-XR) 500 MG 24 hr tablet, Take 2 tablets by mouth Daily With Breakfast., Disp: 180 tablet, Rfl: 3    mirtazapine (REMERON) 7.5 MG tablet, Take 1 tablet by mouth Every Night., Disp: 30 tablet, Rfl: 2    ondansetron ODT (ZOFRAN-ODT) 4 MG disintegrating tablet, Place 1 tablet on the tongue Every 8 (Eight) Hours As Needed for Vomiting or Nausea., Disp: 30 tablet, Rfl: 3    pantoprazole (Protonix) 40 MG EC tablet, Take 1 tablet by mouth Daily., Disp: 30 tablet, Rfl: 11    QUEtiapine (SEROquel) 25 MG tablet, Take 1 tablet by mouth Every Night., Disp: 90 tablet, Rfl: 3    rosuvastatin (Crestor) 10 MG tablet, Take 1 tablet by mouth Daily., Disp: 90 tablet, Rfl: 3    Sofosbuvir-Velpatasvir (Epclusa) 400-100 MG tablet, Take 1 tablet by mouth Daily., Disp: 28 tablet, Rfl: 5    buPROPion XL (Wellbutrin XL) 150 MG 24 hr tablet, Take 1 tablet by mouth Every Morning., Disp: 30 tablet, Rfl: 0    Insulin Glargine (LANTUS SOLOSTAR) 100 UNIT/ML injection pen, Inject 10 Units under the skin into the appropriate area as directed Every Night for 90 days., Disp: 9 mL, Rfl: 1    Medication  "Considerations:  BENY reviewed and appropriate.      Allergies:   Allergies   Allergen Reactions    Sulfamethoxazole-Trimethoprim Rash    Keflex [Cephalexin] GI Intolerance    Sulfa Antibiotics      Sulfa drugs    Vancomycin Rash         Objective     Physical Exam:  Vital Signs:   Vitals:    07/16/24 1533 07/16/24 1536   BP:  136/74   Pulse:  61   SpO2:  96%   Weight: 68.1 kg (150 lb 3.2 oz)    Height: 152.4 cm (60\")      Body mass index is 29.33 kg/m².     Mental Status Exam:   Hygiene:   good  Cooperation:  Cooperative  Eye Contact:  Good  Psychomotor Behavior:  Appropriate  Affect:  Appropriate  Mood: depressed and anxious  Speech:  Normal  Thought Process:  Goal directed and Linear  Thought Content:  Normal  Suicidal:  None  Homicidal:  None  Hallucinations:  None  Delusion:  None  Memory:  Intact  Orientation:  Person, Place, Time, and Situation  Reliability:  good  Insight:  Good  Judgement:  Good  Impulse Control:  Good  Physical/Medical Issues:   see problem list      Assessment / Plan      Visit Diagnosis/Orders Placed This Visit:  Diagnoses and all orders for this visit:    1. Moderate episode of recurrent major depressive disorder  -     Cariprazine HCl (Vraylar) 1.5 MG capsule capsule; Take 1 capsule by mouth Daily.  Dispense: 30 capsule; Refill: 0  -     buPROPion XL (Wellbutrin XL) 150 MG 24 hr tablet; Take 1 tablet by mouth Every Morning.  Dispense: 30 tablet; Refill: 0    2. Insomnia, unspecified type  -     mirtazapine (REMERON) 7.5 MG tablet; Take 1 tablet by mouth Every Night.  Dispense: 30 tablet; Refill: 2         Functional Status: No impairment    Prognosis: Good with Ongoing Treatment     Impression/Formulation:  Patient appeared alert and oriented.  Patient is voluntarily requesting to continue outpatient psychiatric treatment at [facility].  Patient is receptive to assistance with maintaining a stable lifestyle.  Patient presents with history of     ICD-10-CM " ICD-9-CM   1. Moderate episode of recurrent major depressive disorder  F33.1 296.32   2. Insomnia, unspecified type  G47.00 780.52     Reviewed patient's previous provider notes. Reviewed most recent labs. Patient meets DSM V diagnostic criteria for diagnoses. Diagnoses may be updated as more information becomes available.       Treatment Plan:   Continue vraylar 1.5 mg daily  Continue mirtazapine 7.5 mg at bedtime  Continue seroquel 25 mg at bedtime  Change bupropion to xl 150 mg daily  Follow up in 1 month or sooner if needed  Patient will continue supportive psychotherapy efforts and medications as indicated. Clinic will obtain release of information for current treatment team for continuity of care as needed. Patient will contact this office, call 911 or present to the nearest emergency room should suicidal or homicidal ideations occur.  Discussed medication options and treatment plan of prescribed medication(s) as well as the risks, benefits, and potential side effects. Patient acknowledged and verbally consented to continue with current treatment plan and was educated on the importance of compliance with treatment and follow-up appointments.     Patient instructions:  Medication risks and side effects discussed with patient including risk for worsening mood, changes in behavior, thoughts of suicide or homicide, induction of blanche, serotonin syndrome.   If any thoughts of SI or HI, worsening mood or changes in behavior, call 911 or crisis line [phone], or go to nearest ER at once. Pt.verbalizes understanding and consents to treatment with this medication.  All risks/benefits and side effects discussed with patient, including risk for weight gain, increased lipids, hyperprolactemia, EPS symptoms, including restlessness, muscle  stiffness or contraction of head, face, neck, trunk and limbs, and TD (which can be irreversible). Pt verbalizes understanding and consents to treatment with these medications.      Follow  Up:   Return in about 1 month (around 8/16/2024) for Med Check.        GAGE Garza, PMHNP-BC  [facility]

## 2024-07-25 ENCOUNTER — TELEPHONE (OUTPATIENT)
Dept: INTERNAL MEDICINE | Facility: CLINIC | Age: 54
End: 2024-07-25
Payer: MEDICAID

## 2024-07-25 NOTE — TELEPHONE ENCOUNTER
RE: hyperglycemia after stopping Ozempic    Spoke to patient yesterday and she says that her sugars are still anywhere between 350-400 but she did.    Medications: Metformin 500 mg XL 2 tablets a day    Lantus 10 units subcu twice a day    Recommendations: I increased her evening Lantus dosage to 20 units subcu and continue with the 10 units subcu every morning = 30 units dosage along with continuing the metformin.    Patient has an appointment with you on 7/29/2024 but I told her that I would get this message to you in case you wanted to make any changes with her regiment or get her in sooner.

## 2024-07-25 NOTE — TELEPHONE ENCOUNTER
----- Message from Raisa TROY sent at 7/22/2024  3:33 PM EDT -----  Regarding: FW: Ozempic  Contact: [phone]    ----- Message -----  From: Luz Elena Ballard  Sent: 7/22/2024   3:30 PM EDT  To: Sharon Handley [facility]  Subject: Ozempic                                          I stopped Ozempic because of it making me sick to stomach. But my sugar has been running 500. I adjusted my insulin shot to 20mcg at night but have had no change. It is still running extremely high. I am nauseated, and very dizxy all the time. I have appointment 7/29 to see Dr allen. Should I make any other medication adjustments!

## 2024-07-26 NOTE — TELEPHONE ENCOUNTER
Noted. Recommend patient continue to monitor glucose closely and follow up on 7/29/24.     Dr. Astorga

## 2024-07-29 ENCOUNTER — OFFICE VISIT (OUTPATIENT)
Dept: INTERNAL MEDICINE | Facility: CLINIC | Age: 54
End: 2024-07-29
Payer: MEDICAID

## 2024-07-29 VITALS
SYSTOLIC BLOOD PRESSURE: 126 MMHG | HEART RATE: 96 BPM | TEMPERATURE: 97.3 F | BODY MASS INDEX: 29.49 KG/M2 | RESPIRATION RATE: 20 BRPM | WEIGHT: 151 LBS | OXYGEN SATURATION: 96 % | DIASTOLIC BLOOD PRESSURE: 80 MMHG

## 2024-07-29 DIAGNOSIS — E11.65 TYPE 2 DIABETES MELLITUS WITH HYPERGLYCEMIA, WITHOUT LONG-TERM CURRENT USE OF INSULIN: ICD-10-CM

## 2024-07-29 DIAGNOSIS — K74.60 CIRRHOSIS OF LIVER WITHOUT ASCITES, UNSPECIFIED HEPATIC CIRRHOSIS TYPE: Primary | ICD-10-CM

## 2024-07-29 DIAGNOSIS — D64.9 NORMOCYTIC ANEMIA: ICD-10-CM

## 2024-07-29 LAB
EXPIRATION DATE: ABNORMAL
HBA1C MFR BLD: 10.8 % (ref 4.5–5.7)
Lab: ABNORMAL

## 2024-07-29 PROCEDURE — 3074F SYST BP LT 130 MM HG: CPT | Performed by: STUDENT IN AN ORGANIZED HEALTH CARE EDUCATION/TRAINING PROGRAM

## 2024-07-29 PROCEDURE — 1126F AMNT PAIN NOTED NONE PRSNT: CPT | Performed by: STUDENT IN AN ORGANIZED HEALTH CARE EDUCATION/TRAINING PROGRAM

## 2024-07-29 PROCEDURE — 99215 OFFICE O/P EST HI 40 MIN: CPT | Performed by: STUDENT IN AN ORGANIZED HEALTH CARE EDUCATION/TRAINING PROGRAM

## 2024-07-29 PROCEDURE — 3046F HEMOGLOBIN A1C LEVEL >9.0%: CPT | Performed by: STUDENT IN AN ORGANIZED HEALTH CARE EDUCATION/TRAINING PROGRAM

## 2024-07-29 PROCEDURE — 1160F RVW MEDS BY RX/DR IN RCRD: CPT | Performed by: STUDENT IN AN ORGANIZED HEALTH CARE EDUCATION/TRAINING PROGRAM

## 2024-07-29 PROCEDURE — 1159F MED LIST DOCD IN RCRD: CPT | Performed by: STUDENT IN AN ORGANIZED HEALTH CARE EDUCATION/TRAINING PROGRAM

## 2024-07-29 PROCEDURE — 83036 HEMOGLOBIN GLYCOSYLATED A1C: CPT | Performed by: STUDENT IN AN ORGANIZED HEALTH CARE EDUCATION/TRAINING PROGRAM

## 2024-07-29 PROCEDURE — 3079F DIAST BP 80-89 MM HG: CPT | Performed by: STUDENT IN AN ORGANIZED HEALTH CARE EDUCATION/TRAINING PROGRAM

## 2024-07-29 RX ORDER — PROCHLORPERAZINE 25 MG/1
SUPPOSITORY RECTAL
Qty: 9 EACH | Refills: 3 | Status: CANCELLED | OUTPATIENT
Start: 2024-07-29

## 2024-07-29 RX ORDER — PROCHLORPERAZINE 25 MG/1
1 SUPPOSITORY RECTAL DAILY
Qty: 1 EACH | Refills: 0 | Status: CANCELLED | OUTPATIENT
Start: 2024-07-29

## 2024-07-29 NOTE — PATIENT INSTRUCTIONS
Please check your blood sugar every morning. If the fasting morning blood sugar is greater than 150 for 3 days, increase your long acting insulin (Glargine aka Lantus) by 2 units (From 40 units nightly to 42 units nightly). Check for another 3 days, if still greater than 150, increase by another 2 units. If having lows under 80, decrease long acting by 2 units.

## 2024-07-29 NOTE — PROGRESS NOTES
Follow Up Office Visit      Date: 2024   Patient Name: Luz Elena Ballard  : 1970   MRN: [id number]     Chief Complaint:    Chief Complaint   Patient presents with    Hyperglycemia       History of Present Illness: Luz Elena Ballard is a 54 y.o. female with history of hep C related cirrhosis (Child Shay Class A), esophageal varicies, HTN, HLD, T2DM who is here today for the following.    HPI  History of Present Illness  The patient presents for evaluation of multiple medical concerns.    She underwent an EKG last Friday at the [facility] to check her Qtc and she has been worried about this. She states this is making her quite anxious. She has been under the care of a psychiatrist for medication regulation, but does not believe they have ever fully regulated. She has increased her Wellbutrin dosage due to increased stress levels. She denies exertional chest pain. Has shortness of breath per her baseline with her ZEE. She is not interested seeing cardiology.     Her blood sugar levels have been consistently in the 200s to 300's. She discontinued Ozempic due to adverse effects (nausea), and has noticed fluctuations in her blood sugar levels since stopping Ozempic. She monitors her blood sugar levels with a Freestyle veronique. She has a finger stick monitor at home which she has used to confirm readings in the 300's. Still tolerating oral intake, but eats at irregular schedules.  She is currently taking Lantus, 20 units in the morning and 10 units at night, rotating injection sites. She also takes metformin, 2 tablets once a day (1000mg total). She denies recent fevers or illness, but occasionally feels nauseated.    Supplemental Information  She is taking Vraylar once every other day. She has chest pain mostly at night. She has sleep apnea.    T2DM  - stopped ozempic due to nauea  - Dr. Davison instructed to continue metformin 500mg 2 tablets daily, increased night time lantus to 20u, and started  [address] [place] [address]        Subjective      Review of Systems:   Review of Systems    I have reviewed the patients family history, social history, past medical history, past surgical history and have updated it as appropriate.     Medications:     Current Outpatient Medications:     azelastine (OPTIVAR) 0.05 % ophthalmic solution, Administer 1 drop to both eyes Daily As Needed (itches)., Disp: 6 mL, Rfl: 12    Budeson-Glycopyrrol-Formoterol (BREZTRI) 160-9-4.8 MCG/ACT aerosol inhaler, Inhale 2 puffs 2 (Two) Times a Day., Disp: 10.7 g, Rfl: 11    buPROPion XL (Wellbutrin XL) 150 MG 24 hr tablet, Take 1 tablet by mouth Every Morning., Disp: 30 tablet, Rfl: 0    Cariprazine HCl (Vraylar) 1.5 MG capsule capsule, Take 1 capsule by mouth Daily., Disp: 30 capsule, Rfl: 0    carvedilol (Coreg) 6.25 MG tablet, Take 1 tablet by mouth 2 (Two) Times a Day With Meals., Disp: 60 tablet, Rfl: 11    Continuous Blood Gluc Sensor (FreeStyle Maria C 3 Sensor) misc, Use 1 each Every 21 (Twenty-One) Days., Disp: 6 each, Rfl: 6    Insulin Pen Needle 32G X 4 MM misc, Use 1 Application Daily., Disp: 200 each, Rfl: 1    lidocaine (LIDODERM) 5 %, Place 1 patch on the skin as directed by provider Daily. Remove & Discard patch within 12 hours or as directed by MD, Disp: 30 each, Rfl: 11    lisinopril (PRINIVIL,ZESTRIL) 20 MG tablet, Take 1 tablet by mouth Daily. for blood pressure, Disp: 90 tablet, Rfl: 3    metFORMIN ER (GLUCOPHAGE-XR) 500 MG 24 hr tablet, Take 2 tablets by mouth Daily With Breakfast., Disp: 180 tablet, Rfl: 3    Methadone HCl (DOLOPHINE PO), Take 55 mg by mouth., Disp: , Rfl:     mirtazapine (REMERON) 7.5 MG tablet, Take 1 tablet by mouth Every Night., Disp: 30 tablet, Rfl: 2    ondansetron ODT (ZOFRAN-ODT) 4 MG disintegrating tablet, Place 1 tablet on the tongue Every 8 (Eight) Hours As Needed for Vomiting or Nausea., Disp: 30 tablet, Rfl: 3    pantoprazole (Protonix) 40 MG EC tablet, Take 1 tablet by mouth Daily., Disp: 30 tablet, Rfl:  11    QUEtiapine (SEROquel) 25 MG tablet, Take 1 tablet by mouth Every Night., Disp: 90 tablet, Rfl: 3    rosuvastatin (Crestor) 10 MG tablet, Take 1 tablet by mouth Daily., Disp: 90 tablet, Rfl: 3    Insulin Glargine (LANTUS SOLOSTAR) 100 UNIT/ML injection pen, Inject 10 Units under the skin into the appropriate area as directed Every Night for 90 days., Disp: 9 mL, Rfl: 1    Allergies:   Allergies   Allergen Reactions    Sulfamethoxazole-Trimethoprim Rash    Keflex [Cephalexin] GI Intolerance    Sulfa Antibiotics      Sulfa drugs    Vancomycin Rash       Objective     Physical Exam: Please see above  Vital Signs:   Vitals:    07/29/24 1153   BP: 126/80   BP Location: Left arm   Patient Position: Sitting   Cuff Size: Adult   Pulse: 96   Resp: 20   Temp: 97.3 °F (36.3 °C)   TempSrc: Temporal   SpO2: 96%   Weight: 68.5 kg (151 lb)   PainSc: 0-No pain     Body mass index is 29.49 kg/m².    Physical Exam  Vitals reviewed.   Constitutional:       General: She is not in acute distress.     Appearance: Normal appearance. She is not ill-appearing or toxic-appearing.   HENT:      Head: Normocephalic and atraumatic.      Right Ear: External ear normal.      Left Ear: External ear normal.      Nose: Nose normal. No congestion.      Mouth/Throat:      Mouth: Mucous membranes are moist.      Pharynx: No oropharyngeal exudate or posterior oropharyngeal erythema.   Eyes:      General: No scleral icterus.     Extraocular Movements: Extraocular movements intact.   Cardiovascular:      Rate and Rhythm: Normal rate and regular rhythm.      Pulses: Normal pulses.      Heart sounds: Murmur (II/VI systolic flow murmur across precodium, best appreicated at RUSB, stable) heard.      No friction rub. No gallop.   Pulmonary:      Effort: Pulmonary effort is normal. No respiratory distress.      Breath sounds: Normal breath sounds. No stridor. No wheezing, rhonchi or rales.   Abdominal:      General: Abdomen is flat. There is no distension.    Musculoskeletal:         General: No swelling. Normal range of motion.      Cervical back: Normal range of motion. No rigidity.   Skin:     General: Skin is warm and dry.      Capillary Refill: Capillary refill takes less than 2 seconds.   Neurological:      General: No focal deficit present.      Mental Status: She is alert and oriented to person, place, and time.   Psychiatric:         Behavior: Behavior normal.         Judgment: Judgment normal.      Comments: anxious       Physical Exam        Procedures    Results:   Labs:   Hemoglobin A1C   Date Value Ref Range Status   07/29/2024 10.8 (A) 4.5 - 5.7 % Final   02/15/2024 9.50 (H) 4.80 - 5.60 % Final     TSH   Date Value Ref Range Status   02/15/2024 0.582 0.270 - 4.200 uIU/mL Final      Results  Laboratory Studies  A1c was 9.5.    Testing  EKG showed a few T wave abnormalities and some inversions.        Imaging:   No valid procedures specified.     Assessment / Plan      Assessment/Plan:   Problem List Items Addressed This Visit       Cirrhosis of liver without ascites - Primary    Overview     - Child-Shay score 2/16/24: 6 points, Class A, MELD-Na 21 points, 7-10% estimated 90day mortality         Relevant Orders    Comprehensive metabolic panel    Protime-INR    Type 2 diabetes mellitus with hyperglycemia, without long-term current use of insulin    Relevant Medications    Insulin Glargine (LANTUS SOLOSTAR) 100 UNIT/ML injection pen    Other Relevant Orders    POC Glycosylated Hemoglobin (Hb A1C) (Completed)    Ambulatory Referral to Endocrinology    Lipid panel    Insulin, Total    Glutamic Acid Decarboxylase     Other Visit Diagnoses       Normocytic anemia        Relevant Orders    CBC w AUTO Differential    Vitamin B12    Folate    Iron and TIBC    Ferritin    Reticulocytes            Assessment & Plan  1. Diabetes.  Chronic, worsening.  Patient has had significant hyperglycemia, consistently running in the 300's since stopping her ozempic.   Her blood  pressure and cholesterol levels are within normal range. Her last A1c was 9.5, indicating a slight increase today up to 10.8%. Due to her cirrhosis her medication options are limited. We will continue metformin 1000mg daily. We discussed SGLT2i, but due to level of A1c I feel the diuresis would be too pronounced to start this at this time. The Lantus dosage was increased to 40 units once nightly.  The possibility of adding short-acting insulin before meals was discussed, but deferred due to irregular eating patterns. A referral was made to an endocrinologist. If her blood sugar consistently exceeds 150 for 3 days, the insulin dosage will be increased to 42 units, and then 44 units for the next 3 days if persistently elevated. If hypoglycemia occurs, the insulin dosage will be reduced to 2 units.    2. Abnormal EKG.  The EKG results were normal, with regular rhythm and a few T-wave abnormalities. She denies exertional chest pain or dyspnea. Offered referral to cardiology, but patient deferred.     Follow-up  A follow-up appointment is scheduled for 3 months from now.       Follow Up:   Return in about 3 months (around 10/29/2024) for Recheck diabetes.    I have spent a total of 45 min on reviewing test results/preparing to see patient, counseling patient, performing medically appropriate exam and documenting clinical information in the electronic or other health record     Sal Astorga MD  [facility] SAMMY Solorzano    Patient or patient representative verbalized consent for the use of Ambient Listening during the visit with  Sal Astorga MD for chart documentation. 7/29/2024  12:47 EDT

## 2024-07-30 ENCOUNTER — TELEPHONE (OUTPATIENT)
Dept: INTERNAL MEDICINE | Facility: CLINIC | Age: 54
End: 2024-07-30
Payer: MEDICAID

## 2024-07-30 DIAGNOSIS — B00.1 COLD SORE: Primary | ICD-10-CM

## 2024-07-30 RX ORDER — VALACYCLOVIR HYDROCHLORIDE 1 G/1
2000 TABLET, FILM COATED ORAL 2 TIMES DAILY PRN
Qty: 4 TABLET | Refills: 2 | Status: SHIPPED | OUTPATIENT
Start: 2024-07-30 | End: 2024-07-31

## 2024-07-30 NOTE — TELEPHONE ENCOUNTER
Provider: DR POZO    Caller: MARK ELIAS    Relationship to Patient: PATIENT    Phone Number: [phone]     Reason for Call: THE PATIENT CALLED TO FOLLOW UP ON THE REQUEST SHE MADE EARLY ON 07/30/24.

## 2024-07-30 NOTE — TELEPHONE ENCOUNTER
Caller: Luz Elena Ballard    Relationship to patient: Self    Best call back number: [phone]     Patient is needing: VALACYCLOVIR. THIS KIND OF STRESS ALWAYS MAKE HER BREAKOUT, ON  TOP AND BELOW.         [id number] - [place], [place] - [address] AT [facility] [address] - [phone]  - [phone] FX

## 2024-07-31 NOTE — TELEPHONE ENCOUNTER
Pt notified of message from Dr. Astorga Valtrex sent.     Dr. Astorga  Good understanding verbalized.

## 2024-08-06 DIAGNOSIS — E11.65 TYPE 2 DIABETES MELLITUS WITH HYPERGLYCEMIA, WITHOUT LONG-TERM CURRENT USE OF INSULIN: ICD-10-CM

## 2024-08-06 RX ORDER — INSULIN GLARGINE 100 [IU]/ML
INJECTION, SOLUTION SUBCUTANEOUS
Qty: 9 ML | OUTPATIENT
Start: 2024-08-06

## 2024-08-11 DIAGNOSIS — E11.65 TYPE 2 DIABETES MELLITUS WITH HYPERGLYCEMIA, WITHOUT LONG-TERM CURRENT USE OF INSULIN: ICD-10-CM

## 2024-08-13 DIAGNOSIS — I10 PRIMARY HYPERTENSION: Primary | ICD-10-CM

## 2024-08-13 DIAGNOSIS — R94.31 ABNORMAL EKG: ICD-10-CM

## 2024-08-14 DIAGNOSIS — F33.1 MODERATE EPISODE OF RECURRENT MAJOR DEPRESSIVE DISORDER: ICD-10-CM

## 2024-08-14 DIAGNOSIS — G47.00 INSOMNIA, UNSPECIFIED TYPE: ICD-10-CM

## 2024-08-14 DIAGNOSIS — E11.65 TYPE 2 DIABETES MELLITUS WITH HYPERGLYCEMIA, WITHOUT LONG-TERM CURRENT USE OF INSULIN: ICD-10-CM

## 2024-08-14 RX ORDER — MIRTAZAPINE 7.5 MG/1
7.5 TABLET, FILM COATED ORAL NIGHTLY
Qty: 30 TABLET | Refills: 2 | OUTPATIENT
Start: 2024-08-14

## 2024-08-14 RX ORDER — BUPROPION HYDROCHLORIDE 150 MG/1
150 TABLET ORAL EVERY MORNING
Qty: 7 TABLET | Refills: 0 | Status: SHIPPED | OUTPATIENT
Start: 2024-08-14

## 2024-08-14 NOTE — TELEPHONE ENCOUNTER
Caller: Luz Elena Ballard    Relationship: Self    Best call back number:  [phone]    Requested Prescriptions:   Requested Prescriptions     Pending Prescriptions Disp Refills    Insulin Glargine (LANTUS SOLOSTAR) 100 UNIT/ML injection pen       Sig: Inject 40 Units under the skin into the appropriate area as directed Every Night for 90 days.        Pharmacy where request should be sent: [id number] [place], [place] - [address] - [phone] [facility] [phone] FX     Last office visit with prescribing clinician: 7/29/2024   Last telemedicine visit with prescribing clinician: Visit date not found   Next office visit with prescribing clinician: 11/4/2024     Additional details provided by patient: PATIENT IS OUT OF THIS MEDICATION    Does the patient have less than a 3 day supply:  [x] Yes  [] No      Mansi Shea Rep   08/14/24 14:36 EDT

## 2024-08-14 NOTE — TELEPHONE ENCOUNTER
PT REQUESTING REFILLS DUE TO HAVING TO RESCHEDULE HER APT FOR TOMORROW. PT STATES SHE HAS BEEN EXPOSED TO COVID, APT RESCHEDULED 8/21

## 2024-08-21 ENCOUNTER — OFFICE VISIT (OUTPATIENT)
Dept: BEHAVIORAL HEALTH | Facility: CLINIC | Age: 54
End: 2024-08-21
Payer: MEDICAID

## 2024-08-21 VITALS
DIASTOLIC BLOOD PRESSURE: 74 MMHG | HEART RATE: 65 BPM | OXYGEN SATURATION: 97 % | BODY MASS INDEX: 29.96 KG/M2 | WEIGHT: 152.6 LBS | SYSTOLIC BLOOD PRESSURE: 122 MMHG | HEIGHT: 60 IN

## 2024-08-21 DIAGNOSIS — F33.1 MODERATE EPISODE OF RECURRENT MAJOR DEPRESSIVE DISORDER: ICD-10-CM

## 2024-08-21 PROCEDURE — 3074F SYST BP LT 130 MM HG: CPT | Performed by: REGISTERED NURSE

## 2024-08-21 PROCEDURE — 99214 OFFICE O/P EST MOD 30 MIN: CPT | Performed by: REGISTERED NURSE

## 2024-08-21 PROCEDURE — 3078F DIAST BP <80 MM HG: CPT | Performed by: REGISTERED NURSE

## 2024-08-21 RX ORDER — BUPROPION HYDROCHLORIDE 150 MG/1
150 TABLET ORAL EVERY MORNING
Qty: 60 TABLET | Refills: 0 | Status: SHIPPED | OUTPATIENT
Start: 2024-08-21

## 2024-08-21 NOTE — PROGRESS NOTES
Follow Up Office Visit      Patient Name: Luz Elena Ballard  : 1970   MRN: [id number]     Referring Provider: Sal Astorga MD    Chief Complaint:      ICD-10-CM ICD-9-CM   1. Moderate episode of recurrent major depressive disorder  F33.1 296.32        History of Present Illness:   Luz Elena Ballard is a 54 y.o. female who is here today for follow up and medication management    Subjective      Patient Reports:   History of Present Illness  The patient presents for evaluation of multiple medical concerns.    She reports that her daily intake of Vraylar is effective and she has not experienced any abnormal or unusual side effects. She takes Vraylar in the morning and Seroquel at bedtime. Additionally, she takes Remeron at bedtime, either immediately before sleep or 1 to 2 hours prior.     Her sleep pattern varies, averaging 4 to 6 hours per night, and she often feels tired during the day. She wakes up around noon and consumes cereal for breakfast, followed by lunch at 6 PM. She usually goes to bed around 3 AM.     Her appetite is typically low until the evening. She denies having suicidal thoughts or thoughts of harming others. She occasionally sees shadows but does not find them as frightening as before. She had a disturbing dream involving her sister about 2 weeks ago, which brought back memories of her father. She has a history of nightmares, particularly from her childhood.     She has attempted to use methadone twice but discontinued it after 4 days due to feeling unwell. She was prescribed 50 mg of methadone on the second day, which she found too potent. She initially used methadone for pain management but stopped due to past drug issues. She believes she can manage her symptoms without methadone.    Review of Systems:   Review of Systems   Constitutional:  Negative for appetite change and unexpected weight change.   Eyes:  Negative for visual disturbance.   Respiratory:  Positive for shortness  of breath. Negative for chest tightness.    Cardiovascular:  Positive for chest pain and palpitations.   Gastrointestinal:  Positive for nausea.   Musculoskeletal:  Negative for gait problem.   Skin:  Negative for rash and wound.   Neurological:  Positive for dizziness. Negative for tremors, seizures, weakness and light-headedness.   Psychiatric/Behavioral:  Positive for sleep disturbance. Negative for agitation, behavioral problems, hallucinations, self-injury and suicidal ideas. The patient is not hyperactive.      Sleep pattern: varies  Appetite: no changes     PHQ-9 Depression Screening  Little interest or pleasure in doing things? 1-->several days   Feeling down, depressed, or hopeless? 2-->more than half the days   Trouble falling or staying asleep, or sleeping too much? 3-->nearly every day   Feeling tired or having little energy? 3-->nearly every day   Poor appetite or overeating? 3-->nearly every day   Feeling bad about yourself - or that you are a failure or have let yourself or your family down? 1-->several days   Trouble concentrating on things, such as reading the newspaper or watching television? 3-->nearly every day   Moving or speaking so slowly that other people could have noticed? Or the opposite - being so fidgety or restless that you have been moving around a lot more than usual? 3-->nearly every day   Thoughts that you would be better off dead, or of hurting yourself in some way? 0-->not at all   PHQ-9 Total Score 19   If you checked off any problems, how difficult have these problems made it for you to do your work, take care of things at home, or get along with other people? extremely difficult     EFREM-7 Anxiety Screening  Over the last two weeks, how often have you been bothered by the following problems?  Feeling nervous, anxious or on edge: More than half the days  Not being able to stop or control worrying: Nearly every day  Worrying too much about different things: Nearly every  day  Trouble Relaxing: Nearly every day  Being so restless that it is hard to sit still: Nearly every day  Becoming easily annoyed or irritable: Nearly every day  Feeling afraid as if something awful might happen: Several days  EFREM 7 Total Score: 18  If you checked any problems, how difficult have these problems made it for you to do your work, take care of things at home, or get along with other people: Extremely difficult    RISK ASSESSMENT:  Patient denies any thoughts or intent of suicide today. Patient denies any impulsive behavior today.     Medications:     Current Outpatient Medications:     buPROPion XL (Wellbutrin XL) 150 MG 24 hr tablet, Take 1 tablet by mouth Every Morning., Disp: 60 tablet, Rfl: 0    Cariprazine HCl (Vraylar) 1.5 MG capsule capsule, Take 1 capsule by mouth Daily., Disp: 60 capsule, Rfl: 0    azelastine (OPTIVAR) 0.05 % ophthalmic solution, Administer 1 drop to both eyes Daily As Needed (itches)., Disp: 6 mL, Rfl: 12    Budeson-Glycopyrrol-Formoterol (BREZTRI) 160-9-4.8 MCG/ACT aerosol inhaler, Inhale 2 puffs 2 (Two) Times a Day., Disp: 10.7 g, Rfl: 11    carvedilol (Coreg) 6.25 MG tablet, Take 1 tablet by mouth 2 (Two) Times a Day With Meals., Disp: 60 tablet, Rfl: 11    Continuous Blood Gluc Sensor (FreeStyle Maria C 3 Sensor) misc, Use 1 each Every 21 (Twenty-One) Days., Disp: 6 each, Rfl: 6    Insulin Glargine (LANTUS SOLOSTAR) 100 UNIT/ML injection pen, Inject 40 Units under the skin into the appropriate area as directed Every Night for 90 days., Disp: 36 mL, Rfl: 0    Insulin Pen Needle 32G X 4 MM misc, Use 1 Application Daily., Disp: 200 each, Rfl: 1    lidocaine (LIDODERM) 5 %, Place 1 patch on the skin as directed by provider Daily. Remove & Discard patch within 12 hours or as directed by MD, Disp: 30 each, Rfl: 11    lisinopril (PRINIVIL,ZESTRIL) 20 MG tablet, Take 1 tablet by mouth Daily. for blood pressure, Disp: 90 tablet, Rfl: 3    metFORMIN ER (GLUCOPHAGE-XR) 500 MG 24 hr  "tablet, Take 2 tablets by mouth Daily With Breakfast., Disp: 180 tablet, Rfl: 3    mirtazapine (REMERON) 7.5 MG tablet, Take 1 tablet by mouth Every Night., Disp: 30 tablet, Rfl: 2    ondansetron ODT (ZOFRAN-ODT) 4 MG disintegrating tablet, Place 1 tablet on the tongue Every 8 (Eight) Hours As Needed for Vomiting or Nausea., Disp: 30 tablet, Rfl: 3    pantoprazole (Protonix) 40 MG EC tablet, Take 1 tablet by mouth Daily., Disp: 30 tablet, Rfl: 11    QUEtiapine (SEROquel) 25 MG tablet, Take 1 tablet by mouth Every Night., Disp: 90 tablet, Rfl: 3    rosuvastatin (Crestor) 10 MG tablet, Take 1 tablet by mouth Daily., Disp: 90 tablet, Rfl: 3    Medication Considerations:  BENY reviewed and appropriate.      Allergies:   Allergies   Allergen Reactions    Sulfamethoxazole-Trimethoprim Rash    Keflex [Cephalexin] GI Intolerance    Sulfa Antibiotics      Sulfa drugs    Vancomycin Rash       Results Reviewed:   Results      Objective     Physical Exam:  Vital Signs:   Vitals:    08/21/24 1541 08/21/24 1548   BP:  122/74   Pulse:  65   SpO2:  97%   Weight: 69.2 kg (152 lb 9.6 oz)    Height: 152.4 cm (60\")      Body mass index is 29.8 kg/m².     Mental Status Exam:   Hygiene:   good   Cooperation:  Cooperative  Eye Contact:  Good  Psychomotor Behavior:  Appropriate  Affect:  Appropriate  Mood: normal  Speech:  Normal  Thought Process:  Goal directed and Linear  Thought Content:  Normal  Suicidal:  None  Homicidal:  None  Hallucinations:  Auditory and Not demonstrated today  Delusion:  None  Memory:  Intact  Orientation:  Person, Place, Time, and Situation  Reliability:  good  Insight:  Good  Judgement:  Good  Impulse Control:  Good  Physical/Medical Issues:   see problem list      Assessment / Plan      Visit Diagnosis/Orders Placed This Visit:  Diagnoses and all orders for this visit:    1. Moderate episode of recurrent major depressive disorder  -     buPROPion XL (Wellbutrin XL) 150 MG 24 hr tablet; Take 1 tablet by mouth " Every Morning.  Dispense: 60 tablet; Refill: 0  -     Cariprazine HCl (Vraylar) 1.5 MG capsule capsule; Take 1 capsule by mouth Daily.  Dispense: 60 capsule; Refill: 0          Functional Status: No impairment    Prognosis: Good with Ongoing Treatment     Impression/Formulation:  Patient appeared alert and oriented.  Patient is voluntarily requesting to continue outpatient psychiatric treatment at [facility].  Patient is receptive to assistance with maintaining a stable lifestyle.  Patient presents with history of     ICD-10-CM ICD-9-CM   1. Moderate episode of recurrent major depressive disorder  F33.1 296.32     Reviewed patient's previous provider notes. Reviewed most recent labs. Patient meets DSM V diagnostic criteria for diagnoses. Diagnoses may be updated as more information becomes available.     Assessment & Plan  1. Medication Management.  The current medication regimen will be maintained, including Wellbutrin 150 mg, Vraylar 1.5 mg, Remeron 7.5 mg, and Seroquel 25 mg. Refills for these medications have been provided. The Vraylar refill will be sent to the [facility] on [facility].       Treatment Plan:   Continue current medication regimen  Follow up in 2 months or sooner if needed    Patient will continue supportive psychotherapy efforts and medications as indicated. Clinic will obtain release of information for current treatment team for continuity of care as needed. Patient will contact this office, call 911 or present to the nearest emergency room should suicidal or homicidal ideations occur.  Discussed medication options and treatment plan of prescribed medication(s) as well as the risks, benefits, and potential side effects. Patient acknowledged and verbally consented to continue with current treatment plan and was educated on the importance of compliance with treatment and follow-up appointments.     Patient instructions:  All risks/benefits and side effects discussed with  patient, including risk for weight gain, increased lipids, hyperprolactemia, EPS symptoms, including restlessness, muscle  stiffness or contraction of head, face, neck, trunk and limbs, and TD (which can be irreversible). Pt verbalizes understanding and consents to treatment with these medications.   Medication risks and side effects discussed with patient including risk for worsening mood, changes in behavior, thoughts of suicide or homicide, induction of blanche, serotonin syndrome.   If any thoughts of SI or HI, worsening mood or changes in behavior, call 911 or crisis line [phone], or go to nearest ER at once. Pt.verbalizes understanding and consents to treatment with this medication.     Follow Up:   Return in about 2 months (around 10/21/2024).    Patient or patient representative verbalized consent for the use of Ambient Listening during the visit with  GAGE Garza for chart documentation. 8/21/2024  16:46 EDT    GAGE Garza, [facility]

## 2024-10-16 DIAGNOSIS — F33.1 MODERATE EPISODE OF RECURRENT MAJOR DEPRESSIVE DISORDER: ICD-10-CM

## 2024-10-17 ENCOUNTER — TELEPHONE (OUTPATIENT)
Dept: INTERNAL MEDICINE | Facility: CLINIC | Age: 54
End: 2024-10-17
Payer: MEDICAID

## 2024-10-17 DIAGNOSIS — E11.65 TYPE 2 DIABETES MELLITUS WITH HYPERGLYCEMIA, WITHOUT LONG-TERM CURRENT USE OF INSULIN: ICD-10-CM

## 2024-10-17 RX ORDER — METFORMIN HCL 500 MG
1000 TABLET, EXTENDED RELEASE 24 HR ORAL
Qty: 180 TABLET | Refills: 3 | Status: SHIPPED | OUTPATIENT
Start: 2024-10-17 | End: 2024-10-21 | Stop reason: SDUPTHER

## 2024-10-17 RX ORDER — BUPROPION HYDROCHLORIDE 150 MG/1
150 TABLET ORAL EVERY MORNING
Qty: 60 TABLET | Refills: 0 | OUTPATIENT
Start: 2024-10-17

## 2024-10-17 NOTE — TELEPHONE ENCOUNTER
Caller: Elio Luz Elena Baltazar    Relationship: Self    Best call back number: [phone]     Requested Prescriptions:   Requested Prescriptions     Pending Prescriptions Disp Refills    metFORMIN ER (GLUCOPHAGE-XR) 500 MG 24 hr tablet 180 tablet 3     Sig: Take 2 tablets by mouth Daily With Breakfast.        Pharmacy where request should be sent: [id number] [place] [address] AT [facility] [address] - [phone]  - [phone] FX     Last office visit with prescribing clinician: 7/29/2024   Last telemedicine visit with prescribing clinician: Visit date not found   Next office visit with prescribing clinician: 11/4/2024     Additional details provided by patient: PATIENT STATES THAT SHE WAS INSTRUCTED TO TAKE THIS DIFFERENTLY SO IT WILL LOOK LIKE IT IS TOO EARLY AND SHE NEEDS INSTRUCTIONS ON HOW TO TAKE NOW    Does the patient have less than a 3 day supply:  [x] Yes  [] No    Would you like a call back once the refill request has been completed: [] Yes [x] No    If the office needs to give you a call back, can they leave a voicemail: [] Yes [x] No    Mansi Street Rep   10/17/24 10:22 EDT

## 2024-10-21 RX ORDER — METFORMIN HCL 500 MG
1000 TABLET, EXTENDED RELEASE 24 HR ORAL
Qty: 180 TABLET | Refills: 3 | Status: SHIPPED | OUTPATIENT
Start: 2024-10-21

## 2024-10-21 NOTE — TELEPHONE ENCOUNTER
I called the patient and she states has been taking the Metformin 2 pills twice a day so she has run out of medication. She said someone who was covering for you in August advised her to increase her Metformin from 2 pills to 4 pills a day.  She will be following up with you in 2 weeks.    If you want her to continue on the 4 tablets a day she needs a new rx sent to the pharmacy because she has run out.  If you want her to go back to taking 2 pills in the AM she will not be able to refill the prescription until Wednesday.    Please advise.

## 2024-10-21 NOTE — TELEPHONE ENCOUNTER
I do not see any documentation regarding increasing her metformin to 2 tablets twice daily. I would recommend she resume 1000mg daily out of safety in her liver (we need repeat tests as ordered on 7/29/24 to determine if it is safe to increase to 1000mg BID.    I'm calling in a refill for 1000mg daily now.    Dr. Astorga

## 2024-10-23 DIAGNOSIS — F33.1 MODERATE EPISODE OF RECURRENT MAJOR DEPRESSIVE DISORDER: ICD-10-CM

## 2024-10-23 RX ORDER — BUPROPION HYDROCHLORIDE 150 MG/1
150 TABLET ORAL EVERY MORNING
Qty: 7 TABLET | Refills: 0 | Status: SHIPPED | OUTPATIENT
Start: 2024-10-23

## 2024-10-24 ENCOUNTER — OFFICE VISIT (OUTPATIENT)
Dept: BEHAVIORAL HEALTH | Facility: CLINIC | Age: 54
End: 2024-10-24
Payer: MEDICAID

## 2024-10-24 VITALS
SYSTOLIC BLOOD PRESSURE: 118 MMHG | WEIGHT: 151 LBS | BODY MASS INDEX: 29.64 KG/M2 | HEART RATE: 74 BPM | OXYGEN SATURATION: 98 % | DIASTOLIC BLOOD PRESSURE: 72 MMHG | HEIGHT: 60 IN

## 2024-10-24 DIAGNOSIS — F33.1 MODERATE EPISODE OF RECURRENT MAJOR DEPRESSIVE DISORDER: ICD-10-CM

## 2024-10-24 DIAGNOSIS — G47.00 INSOMNIA, UNSPECIFIED TYPE: ICD-10-CM

## 2024-10-24 RX ORDER — BUPRENORPHINE AND NALOXONE 8; 2 MG/1; MG/1
FILM, SOLUBLE BUCCAL; SUBLINGUAL
COMMUNITY
Start: 2024-10-21

## 2024-10-24 RX ORDER — MIRTAZAPINE 7.5 MG/1
7.5 TABLET, FILM COATED ORAL NIGHTLY
Qty: 30 TABLET | Refills: 2 | Status: SHIPPED | OUTPATIENT
Start: 2024-10-24

## 2024-10-24 NOTE — PROGRESS NOTES
Follow Up Office Visit      Patient Name: Luz Elena Ballard  : 1970   MRN: [id number]     Referring Provider: Sal Astorga MD    Chief Complaint:      ICD-10-CM ICD-9-CM   1. Insomnia, unspecified type  G47.00 780.52   2. Moderate episode of recurrent major depressive disorder  F33.1 296.32        History of Present Illness:   Luz Elena Ballard is a 54 y.o. female who is here today for follow up and medication management    Subjective      Patient Reports:   History of Present Illness  The patient presents for evaluation of anxiety.    She reports that her current medications, Vraylar and Wellbutrin, are effective. She is also taking Seroquel, which aids her in falling asleep quickly and maintaining sleep for 4 to 6 hours. She does not experience daytime fatigue, and her appetite remains unchanged. She does not endorse any suicidal ideation or thoughts of harming others.    She continues to experience auditory and visual hallucinations but notes a change in their nature. She is currently taking Remeron and Seroquel and expresses a desire to continue these medications.    She has recently started Suboxone, which she finds beneficial. She discontinued methadone due to excessive drowsiness.    She has previously tried Prozac, Lexapro, duloxetine, Celexa, Zoloft, Wellbutrin, and Seroquel for her anxiety. She recalls that Cymbalta and Prozac were particularly effective.    Review of Systems:   Review of Systems   Constitutional:  Negative for appetite change and unexpected weight change.   Eyes:  Negative for visual disturbance.   Respiratory:  Positive for shortness of breath. Negative for chest tightness.    Cardiovascular:  Positive for chest pain and palpitations.   Gastrointestinal:  Positive for nausea.   Musculoskeletal:  Negative for gait problem.   Skin:  Negative for rash and wound.   Neurological:  Positive for dizziness. Negative for tremors, seizures, weakness and light-headedness.    Psychiatric/Behavioral:  Positive for confusion and sleep disturbance. Negative for agitation, behavioral problems, hallucinations, self-injury and suicidal ideas. The patient is not hyperactive.      Sleep pattern: sleeping 4-6 hours a night  Appetite: increased     PHQ-9 Depression Screening  Little interest or pleasure in doing things? Over half   Feeling down, depressed, or hopeless? Almost all   PHQ-2 Total Score 5   Trouble falling or staying asleep, or sleeping too much? Over half   Feeling tired or having little energy? Almost all   Poor appetite or overeating? Almost all   Feeling bad about yourself - or that you are a failure or have let yourself or your family down? Not at all   Trouble concentrating on things, such as reading the newspaper or watching television? Almost all   Moving or speaking so slowly that other people could have noticed? Or the opposite - being so fidgety or restless that you have been moving around a lot more than usual? Almost all   Thoughts that you would be better off dead, or of hurting yourself in some way? Not at all   PHQ-9 Total Score 19   If you checked off any problems, how difficult have these problems made it for you to do your work, take care of things at home, or get along with other people? Extremely difficult       EFREM-7 Anxiety Screening  Over the last two weeks, how often have you been bothered by the following problems?  Feeling nervous, anxious or on edge: Nearly every day  Not being able to stop or control worrying: Nearly every day  Worrying too much about different things: Nearly every day  Trouble Relaxing: More than half the days  Being so restless that it is hard to sit still: Nearly every day  Becoming easily annoyed or irritable: Nearly every day  Feeling afraid as if something awful might happen: Not at all  EFREM 7 Total Score: 17  If you checked any problems, how difficult have these problems made it for you to do your work, take care of things at  home, or get along with other people: Extremely difficult    RISK ASSESSMENT:  Patient denies any thoughts or intent of suicide today. Patient denies any impulsive behavior today.     Medications:     Current Outpatient Medications:     azelastine (OPTIVAR) 0.05 % ophthalmic solution, Administer 1 drop to both eyes Daily As Needed (itches)., Disp: 6 mL, Rfl: 12    Budeson-Glycopyrrol-Formoterol (BREZTRI) 160-9-4.8 MCG/ACT aerosol inhaler, Inhale 2 puffs 2 (Two) Times a Day., Disp: 10.7 g, Rfl: 11    buprenorphine-naloxone (SUBOXONE) 8-2 MG film film, TAKE 2 FILMS UNDER THE TONGUE EVERY DAY, Disp: , Rfl:     buPROPion XL (WELLBUTRIN XL) 150 MG 24 hr tablet, TAKE 1 TABLET BY MOUTH EVERY MORNING, Disp: 7 tablet, Rfl: 0    Cariprazine HCl (Vraylar) 1.5 MG capsule capsule, Take 1 capsule by mouth Daily., Disp: 60 capsule, Rfl: 0    carvedilol (Coreg) 6.25 MG tablet, Take 1 tablet by mouth 2 (Two) Times a Day With Meals., Disp: 60 tablet, Rfl: 11    Continuous Blood Gluc Sensor (FreeStyle Maria C 3 Sensor) misc, Use 1 each Every 21 (Twenty-One) Days., Disp: 6 each, Rfl: 6    Insulin Glargine (LANTUS SOLOSTAR) 100 UNIT/ML injection pen, Inject 40 Units under the skin into the appropriate area as directed Every Night for 90 days., Disp: 36 mL, Rfl: 0    Insulin Pen Needle 32G X 4 MM misc, Use 1 Application Daily., Disp: 200 each, Rfl: 1    lidocaine (LIDODERM) 5 %, Place 1 patch on the skin as directed by provider Daily. Remove & Discard patch within 12 hours or as directed by MD, Disp: 30 each, Rfl: 11    lisinopril (PRINIVIL,ZESTRIL) 20 MG tablet, Take 1 tablet by mouth Daily. for blood pressure, Disp: 90 tablet, Rfl: 3    metFORMIN ER (GLUCOPHAGE-XR) 500 MG 24 hr tablet, Take 2 tablets by mouth Daily With Breakfast., Disp: 180 tablet, Rfl: 3    mirtazapine (REMERON) 7.5 MG tablet, Take 1 tablet by mouth Every Night., Disp: 30 tablet, Rfl: 2    ondansetron ODT (ZOFRAN-ODT) 4 MG disintegrating tablet, Place 1 tablet on the  "tongue Every 8 (Eight) Hours As Needed for Vomiting or Nausea., Disp: 30 tablet, Rfl: 3    pantoprazole (Protonix) 40 MG EC tablet, Take 1 tablet by mouth Daily., Disp: 30 tablet, Rfl: 11    QUEtiapine (SEROquel) 25 MG tablet, Take 1 tablet by mouth Every Night., Disp: 90 tablet, Rfl: 3    rosuvastatin (Crestor) 10 MG tablet, Take 1 tablet by mouth Daily., Disp: 90 tablet, Rfl: 3    Medication Considerations:  BENY reviewed and appropriate.      Allergies:   Allergies   Allergen Reactions    Sulfamethoxazole-Trimethoprim Rash    Keflex [Cephalexin] GI Intolerance    Sulfa Antibiotics      Sulfa drugs    Vancomycin Rash           Objective     Physical Exam:  Vital Signs:   Vitals:    10/24/24 1201 10/24/24 1203   BP:  118/72   Pulse:  74   SpO2:  98%   Weight: 68.5 kg (151 lb)    Height: 152.4 cm (60\")      Body mass index is 29.49 kg/m².     Mental Status Exam:   Hygiene:   good   Cooperation:  Cooperative  Eye Contact:  Good  Psychomotor Behavior:  Appropriate  Affect:  Appropriate  Mood: normal  Speech:  Normal  Thought Process:  Goal directed and Linear  Thought Content:  Normal  Suicidal:  None  Homicidal:  None  Hallucinations:  None  Delusion:  Unable to demonstrate  Memory:  Intact  Orientation:  Person, Place, Time, and Situation  Reliability:  good  Insight:  Good  Judgement:  Good  Impulse Control:  Good  Physical/Medical Issues:   see problem list      Assessment / Plan      Visit Diagnosis/Orders Placed This Visit:  Diagnoses and all orders for this visit:    1. Insomnia, unspecified type  -     mirtazapine (REMERON) 7.5 MG tablet; Take 1 tablet by mouth Every Night.  Dispense: 30 tablet; Refill: 2    2. Moderate episode of recurrent major depressive disorder  -     Cariprazine HCl (Vraylar) 1.5 MG capsule capsule; Take 1 capsule by mouth Daily.  Dispense: 60 capsule; Refill: 0           Functional Status: Mild impairment     Prognosis: Good with Ongoing Treatment     Impression/Formulation:  Patient " appeared alert and oriented.  Patient is voluntarily requesting to continue outpatient psychiatric treatment at [facility].  Patient is receptive to assistance with maintaining a stable lifestyle.  Patient presents with history of     ICD-10-CM ICD-9-CM   1. Insomnia, unspecified type  G47.00 780.52   2. Moderate episode of recurrent major depressive disorder  F33.1 296.32     Reviewed patient's previous provider notes. Reviewed most recent labs. Patient meets DSM V diagnostic criteria for diagnoses. Diagnoses may be updated as more information becomes available.     Assessment & Plan  1. Anxiety.  The dosage of Wellbutrin will be reduced to 150 mg, to be taken every other day for a week before discontinuation. Wellbutrin can sometimes increase anxiety, so reducing it might help alleviate symptoms. If stopping Wellbutrin causes any issues, it can be resumed or adjusted. Vraylar, which may help with anxiety, will be continued, and a prescription has been sent to [facility]. The patient has previously tried Prozac, Lexapro, duloxetine, Celexa, Zoloft, and Seroquel for anxiety.    2. Sleep disturbances.  The patient reports variable sleep patterns, getting between four to six hours of sleep. Seroquel and Remeron (mirtazapine 7.5 mg) are being used to aid sleep. The patient will continue taking these medications as they seem to help. A prescription for Remeron has been sent.    3. Psychotic symptoms.  The patient continues to experience auditory and visual hallucinations but feels better equipped to handle them and they are less frequent. Vraylar, which helps stabilize mood and reduce psychotic symptoms, will be continued.    4. Medication Management.  The patient is currently on Suboxone after discontinuing methadone, which was causing grogginess. The patient's affect appears different, but no specific changes to Suboxone are planned at this time.    Follow-up  Patient return in 1 month for  follow up.    Treatment Plan:   Taper wellbutrin every other day for 1 week and then discontinue  Continue vraylar 1.5 mg daily, continue seroquel and remeron    Patient will continue supportive psychotherapy efforts and medications as indicated. Clinic will obtain release of information for current treatment team for continuity of care as needed. Patient will contact this office, call 911 or present to the nearest emergency room should suicidal or homicidal ideations occur.  Discussed medication options and treatment plan of prescribed medication(s) as well as the risks, benefits, and potential side effects. Patient acknowledged and verbally consented to continue with current treatment plan and was educated on the importance of compliance with treatment and follow-up appointments.     Patient instructions:  All risks/benefits and side effects discussed with patient, including risk for weight gain, increased lipids, hyperprolactemia, EPS symptoms, including restlessness, muscle  stiffness or contraction of head, face, neck, trunk and limbs, and TD (which can be irreversible). Pt verbalizes understanding and consents to treatment with these medications.     Follow Up:   Return in about 1 month (around 11/24/2024) for Med Check.    Patient or patient representative verbalized consent for the use of Ambient Listening during the visit with  GAGE Garza for chart documentation. 10/24/2024  15:27 EDT    GAGE Garza, [facility]

## 2024-11-04 ENCOUNTER — HOSPITAL ENCOUNTER (OUTPATIENT)
Dept: GENERAL RADIOLOGY | Facility: HOSPITAL | Age: 54
Discharge: HOME | End: 2024-11-04
Admitting: STUDENT IN AN ORGANIZED HEALTH CARE EDUCATION/TRAINING PROGRAM
Payer: MEDICAID

## 2024-11-04 ENCOUNTER — OFFICE VISIT (OUTPATIENT)
Dept: INTERNAL MEDICINE | Facility: CLINIC | Age: 54
End: 2024-11-04
Payer: MEDICAID

## 2024-11-04 VITALS
DIASTOLIC BLOOD PRESSURE: 70 MMHG | BODY MASS INDEX: 30.37 KG/M2 | WEIGHT: 155.5 LBS | RESPIRATION RATE: 18 BRPM | TEMPERATURE: 97.3 F | OXYGEN SATURATION: 97 % | HEART RATE: 71 BPM | SYSTOLIC BLOOD PRESSURE: 124 MMHG

## 2024-11-04 DIAGNOSIS — E11.65 TYPE 2 DIABETES MELLITUS WITH HYPERGLYCEMIA, WITHOUT LONG-TERM CURRENT USE OF INSULIN: ICD-10-CM

## 2024-11-04 DIAGNOSIS — M70.62 TROCHANTERIC BURSITIS OF BOTH HIPS: ICD-10-CM

## 2024-11-04 DIAGNOSIS — D64.9 NORMOCYTIC ANEMIA: ICD-10-CM

## 2024-11-04 DIAGNOSIS — M54.50 CHRONIC LOW BACK PAIN, UNSPECIFIED BACK PAIN LATERALITY, UNSPECIFIED WHETHER SCIATICA PRESENT: ICD-10-CM

## 2024-11-04 DIAGNOSIS — M79.672 BILATERAL FOOT PAIN: ICD-10-CM

## 2024-11-04 DIAGNOSIS — M79.671 BILATERAL FOOT PAIN: ICD-10-CM

## 2024-11-04 DIAGNOSIS — Z23 ENCOUNTER FOR VACCINATION: ICD-10-CM

## 2024-11-04 DIAGNOSIS — K74.60 CIRRHOSIS OF LIVER WITHOUT ASCITES, UNSPECIFIED HEPATIC CIRRHOSIS TYPE: ICD-10-CM

## 2024-11-04 DIAGNOSIS — M70.61 TROCHANTERIC BURSITIS OF BOTH HIPS: ICD-10-CM

## 2024-11-04 DIAGNOSIS — I10 ESSENTIAL HYPERTENSION: ICD-10-CM

## 2024-11-04 DIAGNOSIS — Z79.4 TYPE 2 DIABETES MELLITUS WITH HYPERGLYCEMIA, WITH LONG-TERM CURRENT USE OF INSULIN: Primary | ICD-10-CM

## 2024-11-04 DIAGNOSIS — G89.29 CHRONIC LOW BACK PAIN, UNSPECIFIED BACK PAIN LATERALITY, UNSPECIFIED WHETHER SCIATICA PRESENT: ICD-10-CM

## 2024-11-04 DIAGNOSIS — E11.65 TYPE 2 DIABETES MELLITUS WITH HYPERGLYCEMIA, WITH LONG-TERM CURRENT USE OF INSULIN: Primary | ICD-10-CM

## 2024-11-04 LAB
EXPIRATION DATE: ABNORMAL
HBA1C MFR BLD: 11.4 % (ref 4.5–5.7)
Lab: ABNORMAL

## 2024-11-04 PROCEDURE — 90656 IIV3 VACC NO PRSV 0.5 ML IM: CPT | Performed by: STUDENT IN AN ORGANIZED HEALTH CARE EDUCATION/TRAINING PROGRAM

## 2024-11-04 PROCEDURE — 1160F RVW MEDS BY RX/DR IN RCRD: CPT | Performed by: STUDENT IN AN ORGANIZED HEALTH CARE EDUCATION/TRAINING PROGRAM

## 2024-11-04 PROCEDURE — 99214 OFFICE O/P EST MOD 30 MIN: CPT | Performed by: STUDENT IN AN ORGANIZED HEALTH CARE EDUCATION/TRAINING PROGRAM

## 2024-11-04 PROCEDURE — 3074F SYST BP LT 130 MM HG: CPT | Performed by: STUDENT IN AN ORGANIZED HEALTH CARE EDUCATION/TRAINING PROGRAM

## 2024-11-04 PROCEDURE — 3078F DIAST BP <80 MM HG: CPT | Performed by: STUDENT IN AN ORGANIZED HEALTH CARE EDUCATION/TRAINING PROGRAM

## 2024-11-04 PROCEDURE — 91320 SARSCV2 VAC 30MCG TRS-SUC IM: CPT | Performed by: STUDENT IN AN ORGANIZED HEALTH CARE EDUCATION/TRAINING PROGRAM

## 2024-11-04 PROCEDURE — 90471 IMMUNIZATION ADMIN: CPT | Performed by: STUDENT IN AN ORGANIZED HEALTH CARE EDUCATION/TRAINING PROGRAM

## 2024-11-04 PROCEDURE — 1159F MED LIST DOCD IN RCRD: CPT | Performed by: STUDENT IN AN ORGANIZED HEALTH CARE EDUCATION/TRAINING PROGRAM

## 2024-11-04 PROCEDURE — 1126F AMNT PAIN NOTED NONE PRSNT: CPT | Performed by: STUDENT IN AN ORGANIZED HEALTH CARE EDUCATION/TRAINING PROGRAM

## 2024-11-04 PROCEDURE — 73522 X-RAY EXAM HIPS BI 3-4 VIEWS: CPT

## 2024-11-04 PROCEDURE — 90480 ADMN SARSCOV2 VAC 1/ONLY CMP: CPT | Performed by: STUDENT IN AN ORGANIZED HEALTH CARE EDUCATION/TRAINING PROGRAM

## 2024-11-04 PROCEDURE — 3046F HEMOGLOBIN A1C LEVEL >9.0%: CPT | Performed by: STUDENT IN AN ORGANIZED HEALTH CARE EDUCATION/TRAINING PROGRAM

## 2024-11-04 PROCEDURE — 83036 HEMOGLOBIN GLYCOSYLATED A1C: CPT | Performed by: STUDENT IN AN ORGANIZED HEALTH CARE EDUCATION/TRAINING PROGRAM

## 2024-11-04 NOTE — PROGRESS NOTES
Follow Up Office Visit      Date: 2024   Patient Name: Luz Elena Ballard  : 1970   MRN: [id number]     Chief Complaint:    Chief Complaint   Patient presents with   • Follow-up     3 month        History of Present Illness: Luz Elena Ballard is a 54 y.o. 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    HPI  History of Present Illness  The patient presents for evaluation of multiple medical concerns.    She is currently on a regimen of two metformin tablets daily and 40 units of insulin at night. Despite this, her blood sugar levels remain in the 300s. She reports that her blood sugar levels are more stable at night, but they do not decrease. She has an upcoming appointment with Dr. Rosenstein in Endocrinology in 4 days. She denies any low blood sugar readings. She did not self titrate her insulin dosage as we discussed last visit.     She has experienced a fall from a scooter, resulting in difficulty sleeping on either side due to pain in both hips. She has been using lidocaine patches for pain relief but is interested in trying a cream.    She has noticed that her big toe is becoming more crooked as her diabetes progresses. She has never consulted a podiatrist. Additionally, she experiences cramps in her feet and muscles.    She has not yet received her influenza vaccine for this year. She does not require any medication refills at this time. She has discontinued the use of her continuous glucose monitor.       T2DM  - had signficant elevation of glucose last visit, continued on metformin 1000mg daily. Increased lantus to 40mg, taught how to titrate  - referred to endocrinology (has appt 24)    Insomnia  Anxiety  - personally reviewed note by Antonia ALMONTE of psychiatry dated 10/24/24. AT that time weaning wellbutrin. Continued vraylar, seroquel and remeron.       Subjective       Review of Systems:   Review of Systems    I have reviewed the patients family history, social history, past medical history, past surgical history and have updated it as appropriate.     Medications:     Current Outpatient Medications:   •  azelastine (OPTIVAR) 0.05 % ophthalmic solution, Administer 1 drop to both eyes Daily As Needed (itches)., Disp: 6 mL, Rfl: 12  •  Budeson-Glycopyrrol-Formoterol (BREZTRI) 160-9-4.8 MCG/ACT aerosol inhaler, Inhale 2 puffs 2 (Two) Times a Day., Disp: 10.7 g, Rfl: 11  •  buprenorphine-naloxone (SUBOXONE) 8-2 MG film film, TAKE 2 FILMS UNDER THE TONGUE EVERY DAY, Disp: , Rfl:   •  buPROPion XL (WELLBUTRIN XL) 150 MG 24 hr tablet, TAKE 1 TABLET BY MOUTH EVERY MORNING, Disp: 7 tablet, Rfl: 0  •  Cariprazine HCl (Vraylar) 1.5 MG capsule capsule, Take 1 capsule by mouth Daily., Disp: 60 capsule, Rfl: 0  •  carvedilol (Coreg) 6.25 MG tablet, Take 1 tablet by mouth 2 (Two) Times a Day With Meals., Disp: 60 tablet, Rfl: 11  •  Continuous Blood Gluc Sensor (FreeStyle Maria C 3 Sensor) misc, Use 1 each Every 21 (Twenty-One) Days., Disp: 6 each, Rfl: 6  •  Insulin Glargine (LANTUS SOLOSTAR) 100 UNIT/ML injection pen, Inject 40 Units under the skin into the appropriate area as directed Every Night for 90 days., Disp: 36 mL, Rfl: 0  •  Insulin Pen Needle 32G X 4 MM misc, Use 1 Application Daily., Disp: 200 each, Rfl: 1  •  lidocaine (LIDODERM) 5 %, Place 1 patch on the skin as directed by provider Daily. Remove & Discard patch within 12 hours or as directed by MD, Disp: 30 each, Rfl: 11  •  lisinopril (PRINIVIL,ZESTRIL) 20 MG tablet, Take 1 tablet by mouth Daily. for blood pressure, Disp: 90 tablet, Rfl: 3  •  metFORMIN ER (GLUCOPHAGE-XR) 500 MG 24 hr tablet, Take 2 tablets by mouth Daily With Breakfast., Disp: 180 tablet, Rfl: 3  •  mirtazapine (REMERON) 7.5 MG tablet, Take 1 tablet by mouth Every Night., Disp: 30 tablet, Rfl: 2  •  ondansetron ODT (ZOFRAN-ODT) 4 MG disintegrating tablet,  Place 1 tablet on the tongue Every 8 (Eight) Hours As Needed for Vomiting or Nausea., Disp: 30 tablet, Rfl: 3  •  pantoprazole (Protonix) 40 MG EC tablet, Take 1 tablet by mouth Daily., Disp: 30 tablet, Rfl: 11  •  QUEtiapine (SEROquel) 25 MG tablet, Take 1 tablet by mouth Every Night., Disp: 90 tablet, Rfl: 3  •  rosuvastatin (Crestor) 10 MG tablet, Take 1 tablet by mouth Daily., Disp: 90 tablet, Rfl: 3    Allergies:   Allergies   Allergen Reactions   • Sulfamethoxazole-Trimethoprim Rash   • Keflex [Cephalexin] GI Intolerance   • Sulfa Antibiotics      Sulfa drugs   • Vancomycin Rash       Objective     Physical Exam: Please see above  Vital Signs:   Vitals:    11/04/24 1410   BP: 124/70   BP Location: Left arm   Patient Position: Sitting   Cuff Size: Adult   Pulse: 71   Resp: 18   Temp: 97.3 °F (36.3 °C)   TempSrc: Temporal   SpO2: 97%   Weight: 70.5 kg (155 lb 8 oz)   PainSc: 0-No pain     Body mass index is 30.37 kg/m².          Physical Exam  Vitals reviewed.   Constitutional:       General: She is not in acute distress.     Appearance: Normal appearance. She is obese. She is not ill-appearing or toxic-appearing.   HENT:      Head: Normocephalic and atraumatic.      Right Ear: External ear normal.      Left Ear: External ear normal.      Nose: Nose normal. No congestion.      Mouth/Throat:      Mouth: Mucous membranes are moist.   Eyes:      General: No scleral icterus.     Extraocular Movements: Extraocular movements intact.   Cardiovascular:      Rate and Rhythm: Normal rate and regular rhythm.      Pulses: Normal pulses.      Heart sounds: Murmur (II/VI systolic flow murmur across precodium, best appreicated at RUSB, stable) heard.      No friction rub. No gallop.   Pulmonary:      Effort: Pulmonary effort is normal. No respiratory distress.      Breath sounds: Normal breath sounds. No stridor. No wheezing, rhonchi or rales.   Abdominal:      General: Abdomen is flat. There is no distension.    Musculoskeletal:         General: Tenderness (over greater trochanters b/l) present. No swelling. Normal range of motion.      Cervical back: Normal range of motion. No rigidity.      Comments: No step offs at hips/prox femur. Mild pain with internal rotation of left hip. Negative windsheild wiper test on right.    Skin:     General: Skin is warm and dry.      Capillary Refill: Capillary refill takes less than 2 seconds.   Neurological:      General: No focal deficit present.      Mental Status: She is alert and oriented to person, place, and time.   Psychiatric:         Mood and Affect: Mood normal.         Behavior: Behavior normal.         Judgment: Judgment normal.       Physical Exam        Procedures    Results:   Labs:   Hemoglobin A1C   Date Value Ref Range Status   07/29/2024 10.8 (A) 4.5 - 5.7 % Final   02/15/2024 9.50 (H) 4.80 - 5.60 % Final     TSH   Date Value Ref Range Status   02/15/2024 0.582 0.270 - 4.200 uIU/mL Final      Results  Laboratory Studies  A1c was 11.4.      Imaging:   No valid procedures specified.     Assessment / Plan      Assessment/Plan:   Problem List Items Addressed This Visit       Essential hypertension    Current Assessment & Plan     Hypertension is stable and controlled  Continue current treatment regimen.  Blood pressure will be reassessed in 3 months.         Chronic low back pain    Overview     -History of degenerative disease and osteomyelitis         Relevant Medications    Diclofenac Sodium (VOLTAREN) 1 % gel gel    Type 2 diabetes mellitus with hyperglycemia, without long-term current use of insulin    Relevant Medications    Insulin Glargine (LANTUS SOLOSTAR) 100 UNIT/ML injection pen     Other Visit Diagnoses       Type 2 diabetes mellitus with hyperglycemia, with long-term current use of insulin    -  Primary    Relevant Medications    Insulin Glargine (LANTUS SOLOSTAR) 100 UNIT/ML injection pen    Other Relevant Orders    POC Glycosylated Hemoglobin (Hb A1C)     Encounter for vaccination        Relevant Orders    Fluzone >6mos ([id number])    COVID-19 (Pfizer) 12yrs+ (COMIRNATY)    Bilateral foot pain        Relevant Orders    Ambulatory Referral to Podiatry    Trochanteric bursitis of both hips        Relevant Medications    Diclofenac Sodium (VOLTAREN) 1 % gel gel    Other Relevant Orders    XR Hips Bilateral With or Without Pelvis 3-4 View            Assessment & Plan  1. Diabetes Mellitus.  Chronic, worsening.   Her A1c level is elevated at 11.4. She is currently taking metformin 500mg twice a day and 40 units of insulin every night, but her blood sugar remains in the 300s. She did not self titrate as we discussed last visit. The lantus insulin dosage has been adjusted to 25 units twice daily. She is advised to continue taking metformin in the morning. She has an upcoming appointment with Dr. Rosenstein in 4 days to discuss further management, including the potential addition of short-acting insulin.    2. Bilateral Hip Pain, likely 2/2 bursitis.  She reports pain in both hips following a fall from a scooter a month ago, which has affected her sleep and walking. A prescription for Voltaren cream has been provided. If the cream is ineffective, a referral to an orthopedist for potential injections will be considered. Bilateral hip x-rays have been ordered to rule out any fractures.      4. Health Maintenance.  She will receive the influenza and COVID-19 vaccines today. She is up to date on her pneumonia vaccination. She needs to complete the second dose of the shingles vaccine at [facility].    A referral to a podiatrist has been made to address issues with her toes, which may be related to her diabetes.    Follow-up  Return for her annual visit in February 2025.       Follow Up:   Return in about 15 weeks (around 2/17/2025) for Annual, Fasting.        Sal Astorga MD   [facility]    Patient or patient representative verbalized consent for the use of  Ambient Listening during the visit with  Sal Astorga MD for chart documentation. 11/4/2024  14:34 EST

## 2024-11-05 DIAGNOSIS — E11.65 TYPE 2 DIABETES MELLITUS WITH HYPERGLYCEMIA, WITHOUT LONG-TERM CURRENT USE OF INSULIN: ICD-10-CM

## 2024-11-07 NOTE — PROGRESS NOTES
Chief complaint/Reason for consult: T2DM    Consult requested by Sal Astorga MD    HPI: Ms. Ballard is a 54-year-old female with decompensated HCV related cirrhosis, hypertension, hyperlipidemia and uncontrolled diabetes who comes for consultation of diabetes. Has a history of IVDA and struggles getting labs.     # [id number], Uncontrolled due to hyperglycemia  with complications  # Diabetic polyneuropathy   - Diagnosed: ~2020 based on routine labs   - Current regimen includes: Metformin  mg twice daily and Lantus 25 units twice daily  - Compliance with medications is good  - HbA1c: 11.4% done 11/4/2024  - Has not worn veronique recently   - Checks FSBG daily, reports persistent fasting glucose in the 300s and higher during the days   - Hypoglycemia rarely occurs   - Patient has symptoms with lows; feels low when under 200mg/dl   - Hyperglycemia occurs daily, worse overnight   - Patient reports neuropathy   - Patient denies gastroparesis   - Patient reports rotating injection sites in abdomen  - Patient without known ASCVD   - taking ACEi/ARB; blood pressure today 118/70     DM Health Maintenance:  Ophtho: 1/2024, denies retinopathy   Monofilament / Foot exam: done 4/19/2024   Lipids/Statin: taking a statin with last FLP showing LDL 71 done 2/15/2024   GARY: 30 done 2/16/2024   TSH: 0.582 done 2/15/2024  Aspirin: not taking    Past medical history, past surgical history, family history and social history reviewed within this encounter.     Review of Systems   Constitutional:  Negative for activity change and unexpected weight change.   HENT:  Negative for trouble swallowing.    Eyes:  Negative for visual disturbance.   Respiratory:  Negative for shortness of breath.    Gastrointestinal:  Negative for abdominal pain.   Endocrine: Negative for cold intolerance and heat intolerance.   Musculoskeletal:  Negative for gait problem.   Skin:  Negative for rash.   Neurological:  Positive for numbness.  "  Psychiatric/Behavioral:  Negative for agitation. The patient is nervous/anxious.         /70 (BP Location: Left arm, Patient Position: Sitting, Cuff Size: Adult)   Pulse 60   Ht 152.4 cm (60\")   Wt 71.1 kg (156 lb 11.2 oz)   BMI 30.60 kg/m²      Physical Exam  Vitals reviewed.   Constitutional:       General: She is not in acute distress.  HENT:      Head: Normocephalic.      Nose: Nose normal.   Eyes:      Conjunctiva/sclera: Conjunctivae normal.   Cardiovascular:      Rate and Rhythm: Normal rate.   Pulmonary:      Effort: Pulmonary effort is normal.   Abdominal:      Tenderness: There is no guarding.   Musculoskeletal:         General: No deformity.   Skin:     General: Skin is warm and dry.   Neurological:      Mental Status: She is alert and oriented to person, place, and time.   Psychiatric:         Mood and Affect: Mood normal.        Labs and images reviewed as noted in the HPI    Assessment and plan:    Diagnoses and all orders for this visit:    1. Type 2 diabetes mellitus with hyperglycemia, with long-term current use of insulin (Primary)  Assessment & Plan:  -Diabetes is uncontrolled due to hyperglycemia  -Complications include diabetic polyneuropathy  -Recommend improve glycemic control to prevent progression of known complications and new complications arising  -Due to decompensated cirrhosis.  Metformin  -Due to degree of hyperglycemia we will hold off on initiation of GLP-1 agonist or SGLT2 inhibitor at this time  -Decrease Lantus to 20 units twice daily  -Start Humalog 12 units with large meals, 8 units with normal meals and 4 units with small meals/snacks plus 2:50>150mg/dl CF  -Start freestyle veronique 3 plus CGM  -Taking ACEi/; blood pressure today 118/70     DM Health Maintenance:  Ophtho: 1/2024, denies retinopathy   Monofilament / Foot exam: done 4/19/2024   Lipids/Statin: taking a statin with last FLP showing LDL 71 done 2/15/2024   GARY: 30 done 2/16/2024   TSH: 0.582 done " 2/15/2024  Aspirin: not taking    Orders:  -     TSH; Future  -     Microalbumin / Creatinine Urine Ratio - Urine, Clean Catch; Future  -     Comprehensive Metabolic Panel; Future  -     Insulin Glargine (LANTUS SOLOSTAR) 100 UNIT/ML injection pen; Inject 20 units twice daily, titrate for max daily dose of 100 units  Dispense: 90 mL; Refill: 0  -     Insulin Lispro, 1 Unit Dial, (HumaLOG KwikPen) 100 UNIT/ML solution pen-injector; Inject 12 units with large meals, 8 units with normal meals and 4 units with small meals/snacks plus 2:50>150mg/dl CF; titrate for max daily dose of 100 units  Dispense: 90 mL; Refill: 0  -     Continuous Glucose Sensor (FreeStyle Maria C 3 Plus Sensor); Use Every 14 (Fourteen) Days.  Dispense: 6 each; Refill: 3  -     Insulin Pen Needle 32G X 4 MM misc; Use up to 5 times daily with insulin  Dispense: 500 each; Refill: 0         Return in about 3 months (around 2/8/2025) for T2DM.     Please note that portions of this note may have been completed with a voice recognition program. Efforts were made to edit the dictations, but occasionally words are mistranscribed.     Electronically signed by: Kyle S Rosenstein, MD

## 2024-11-08 ENCOUNTER — OFFICE VISIT (OUTPATIENT)
Dept: ENDOCRINOLOGY | Facility: CLINIC | Age: 54
End: 2024-11-08
Payer: MEDICAID

## 2024-11-08 VITALS
BODY MASS INDEX: 30.77 KG/M2 | WEIGHT: 156.7 LBS | DIASTOLIC BLOOD PRESSURE: 70 MMHG | SYSTOLIC BLOOD PRESSURE: 118 MMHG | HEIGHT: 60 IN | HEART RATE: 60 BPM

## 2024-11-08 DIAGNOSIS — Z79.4 TYPE 2 DIABETES MELLITUS WITH HYPERGLYCEMIA, WITH LONG-TERM CURRENT USE OF INSULIN: Primary | ICD-10-CM

## 2024-11-08 DIAGNOSIS — E11.65 TYPE 2 DIABETES MELLITUS WITH HYPERGLYCEMIA, WITH LONG-TERM CURRENT USE OF INSULIN: Primary | ICD-10-CM

## 2024-11-08 PROCEDURE — 3078F DIAST BP <80 MM HG: CPT | Performed by: HOSPITALIST

## 2024-11-08 PROCEDURE — 99204 OFFICE O/P NEW MOD 45 MIN: CPT | Performed by: HOSPITALIST

## 2024-11-08 PROCEDURE — 3046F HEMOGLOBIN A1C LEVEL >9.0%: CPT | Performed by: HOSPITALIST

## 2024-11-08 PROCEDURE — 1160F RVW MEDS BY RX/DR IN RCRD: CPT | Performed by: HOSPITALIST

## 2024-11-08 PROCEDURE — 1159F MED LIST DOCD IN RCRD: CPT | Performed by: HOSPITALIST

## 2024-11-08 PROCEDURE — 3074F SYST BP LT 130 MM HG: CPT | Performed by: HOSPITALIST

## 2024-11-08 RX ORDER — BLOOD-GLUCOSE SENSOR
EACH MISCELLANEOUS
Qty: 6 EACH | Refills: 3 | Status: SHIPPED | OUTPATIENT
Start: 2024-11-08

## 2024-11-08 RX ORDER — INSULIN LISPRO 100 [IU]/ML
INJECTION, SOLUTION INTRAVENOUS; SUBCUTANEOUS
Qty: 90 ML | Refills: 0 | Status: SHIPPED | OUTPATIENT
Start: 2024-11-08

## 2024-11-08 NOTE — ASSESSMENT & PLAN NOTE
-Diabetes is uncontrolled due to hyperglycemia  -Complications include diabetic polyneuropathy  -Recommend improve glycemic control to prevent progression of known complications and new complications arising  -Due to decompensated cirrhosis.  Metformin  -Due to degree of hyperglycemia we will hold off on initiation of GLP-1 agonist or SGLT2 inhibitor at this time  -Decrease Lantus to 20 units twice daily  -Start Humalog 12 units with large meals, 8 units with normal meals and 4 units with small meals/snacks plus 2:50>150mg/dl CF  -Start freestyle veronique 3 plus CGM  -Taking ACEi/; blood pressure today 118/70     DM Health Maintenance:  Ophtho: 1/2024, denies retinopathy   Monofilament / Foot exam: done 4/19/2024   Lipids/Statin: taking a statin with last FLP showing LDL 71 done 2/15/2024   GARY: 30 done 2/16/2024   TSH: 0.582 done 2/15/2024  Aspirin: not taking

## 2024-11-19 ENCOUNTER — TELEPHONE (OUTPATIENT)
Dept: ENDOCRINOLOGY | Facility: CLINIC | Age: 54
End: 2024-11-19

## 2024-11-19 NOTE — TELEPHONE ENCOUNTER
Called the pt and she is taking her insulin the way she was instructed. I went over the directions with her.     She stated she is running in the 400's and this am it was 260. This am was the best reading for the past week.    She is wearing the Maria C but is not sharing.    Please advise.

## 2024-11-19 NOTE — TELEPHONE ENCOUNTER
Provider: ROSENSTEIN    Caller: Luz Elena Ballard    Relationship to Patient: Self    Reason for Call: PATIENT WOULD TO SPEAK WITH DIABETES EDUCATOR ABOUT HER SLIDING SCALE. HER NUMBERS STILL RUNNING HIGH. PLEASE CALL PATIENT

## 2024-11-21 ENCOUNTER — LAB (OUTPATIENT)
Dept: LAB | Facility: HOSPITAL | Age: 54
End: 2024-11-21
Payer: MEDICAID

## 2024-11-21 ENCOUNTER — OFFICE VISIT (OUTPATIENT)
Dept: BEHAVIORAL HEALTH | Facility: CLINIC | Age: 54
End: 2024-11-21
Payer: MEDICAID

## 2024-11-21 VITALS
HEART RATE: 69 BPM | SYSTOLIC BLOOD PRESSURE: 116 MMHG | OXYGEN SATURATION: 96 % | WEIGHT: 157 LBS | DIASTOLIC BLOOD PRESSURE: 72 MMHG | HEIGHT: 60 IN | BODY MASS INDEX: 30.82 KG/M2

## 2024-11-21 DIAGNOSIS — I10 ESSENTIAL HYPERTENSION: ICD-10-CM

## 2024-11-21 DIAGNOSIS — K74.60 CIRRHOSIS OF LIVER WITHOUT ASCITES, UNSPECIFIED HEPATIC CIRRHOSIS TYPE: ICD-10-CM

## 2024-11-21 DIAGNOSIS — G47.00 INSOMNIA, UNSPECIFIED TYPE: ICD-10-CM

## 2024-11-21 DIAGNOSIS — M70.62 TROCHANTERIC BURSITIS OF BOTH HIPS: ICD-10-CM

## 2024-11-21 DIAGNOSIS — M70.61 TROCHANTERIC BURSITIS OF BOTH HIPS: ICD-10-CM

## 2024-11-21 DIAGNOSIS — B18.2 CHRONIC HEPATITIS C WITHOUT HEPATIC COMA: ICD-10-CM

## 2024-11-21 DIAGNOSIS — D64.9 NORMOCYTIC ANEMIA: ICD-10-CM

## 2024-11-21 DIAGNOSIS — E11.65 TYPE 2 DIABETES MELLITUS WITH HYPERGLYCEMIA, WITH LONG-TERM CURRENT USE OF INSULIN: ICD-10-CM

## 2024-11-21 DIAGNOSIS — E11.65 TYPE 2 DIABETES MELLITUS WITH HYPERGLYCEMIA, WITHOUT LONG-TERM CURRENT USE OF INSULIN: ICD-10-CM

## 2024-11-21 DIAGNOSIS — M54.50 CHRONIC LOW BACK PAIN, UNSPECIFIED BACK PAIN LATERALITY, UNSPECIFIED WHETHER SCIATICA PRESENT: ICD-10-CM

## 2024-11-21 DIAGNOSIS — M79.671 BILATERAL FOOT PAIN: ICD-10-CM

## 2024-11-21 DIAGNOSIS — Z23 ENCOUNTER FOR VACCINATION: ICD-10-CM

## 2024-11-21 DIAGNOSIS — F33.1 MODERATE EPISODE OF RECURRENT MAJOR DEPRESSIVE DISORDER: ICD-10-CM

## 2024-11-21 DIAGNOSIS — G89.29 CHRONIC LOW BACK PAIN, UNSPECIFIED BACK PAIN LATERALITY, UNSPECIFIED WHETHER SCIATICA PRESENT: ICD-10-CM

## 2024-11-21 DIAGNOSIS — Z79.4 TYPE 2 DIABETES MELLITUS WITH HYPERGLYCEMIA, WITH LONG-TERM CURRENT USE OF INSULIN: ICD-10-CM

## 2024-11-21 DIAGNOSIS — M79.672 BILATERAL FOOT PAIN: ICD-10-CM

## 2024-11-21 LAB
ALBUMIN SERPL-MCNC: 3.3 G/DL (ref 3.5–5.2)
ALBUMIN/GLOB SERPL: 1 G/DL
ALP SERPL-CCNC: 122 U/L (ref 39–117)
ALT SERPL W P-5'-P-CCNC: 55 U/L (ref 1–33)
ANION GAP SERPL CALCULATED.3IONS-SCNC: 12 MMOL/L (ref 5–15)
AST SERPL-CCNC: 59 U/L (ref 1–32)
BASOPHILS # BLD AUTO: 0.04 10*3/MM3 (ref 0–0.2)
BASOPHILS NFR BLD AUTO: 0.7 % (ref 0–1.5)
BILIRUB SERPL-MCNC: 0.9 MG/DL (ref 0–1.2)
BUN SERPL-MCNC: 10 MG/DL (ref 6–20)
BUN/CREAT SERPL: 9.1 (ref 7–25)
CALCIUM SPEC-SCNC: 9 MG/DL (ref 8.6–10.5)
CHLORIDE SERPL-SCNC: 102 MMOL/L (ref 98–107)
CHOLEST SERPL-MCNC: 132 MG/DL (ref 0–200)
CO2 SERPL-SCNC: 23 MMOL/L (ref 22–29)
CREAT SERPL-MCNC: 1.1 MG/DL (ref 0.57–1)
DEPRECATED RDW RBC AUTO: 47.3 FL (ref 37–54)
EGFRCR SERPLBLD CKD-EPI 2021: 60.2 ML/MIN/1.73
EOSINOPHIL # BLD AUTO: 0.11 10*3/MM3 (ref 0–0.4)
EOSINOPHIL NFR BLD AUTO: 1.9 % (ref 0.3–6.2)
ERYTHROCYTE [DISTWIDTH] IN BLOOD BY AUTOMATED COUNT: 13.3 % (ref 12.3–15.4)
FERRITIN SERPL-MCNC: 88.3 NG/ML (ref 13–150)
FOLATE SERPL-MCNC: 19.6 NG/ML (ref 4.78–24.2)
GLOBULIN UR ELPH-MCNC: 3.2 GM/DL
GLUCOSE SERPL-MCNC: 290 MG/DL (ref 65–99)
HBV SURFACE AB SER RIA-ACNC: REACTIVE
HBV SURFACE AG SERPL QL IA: NORMAL
HCT VFR BLD AUTO: 42.2 % (ref 34–46.6)
HDLC SERPL-MCNC: 41 MG/DL (ref 40–60)
HGB BLD-MCNC: 13.6 G/DL (ref 12–15.9)
IMM GRANULOCYTES # BLD AUTO: 0.02 10*3/MM3 (ref 0–0.05)
IMM GRANULOCYTES NFR BLD AUTO: 0.3 % (ref 0–0.5)
INR PPP: 1.06 (ref 0.89–1.12)
IRON 24H UR-MRATE: 141 MCG/DL (ref 37–145)
IRON SATN MFR SERPL: 39 % (ref 20–50)
LDLC SERPL CALC-MCNC: 70 MG/DL (ref 0–100)
LDLC/HDLC SERPL: 1.67 {RATIO}
LYMPHOCYTES # BLD AUTO: 1.78 10*3/MM3 (ref 0.7–3.1)
LYMPHOCYTES NFR BLD AUTO: 30.8 % (ref 19.6–45.3)
MCH RBC QN AUTO: 31 PG (ref 26.6–33)
MCHC RBC AUTO-ENTMCNC: 32.2 G/DL (ref 31.5–35.7)
MCV RBC AUTO: 96.1 FL (ref 79–97)
MONOCYTES # BLD AUTO: 0.37 10*3/MM3 (ref 0.1–0.9)
MONOCYTES NFR BLD AUTO: 6.4 % (ref 5–12)
NEUTROPHILS NFR BLD AUTO: 3.45 10*3/MM3 (ref 1.7–7)
NEUTROPHILS NFR BLD AUTO: 59.9 % (ref 42.7–76)
NRBC BLD AUTO-RTO: 0 /100 WBC (ref 0–0.2)
PLATELET # BLD AUTO: 123 10*3/MM3 (ref 140–450)
PMV BLD AUTO: 11.6 FL (ref 6–12)
POTASSIUM SERPL-SCNC: 3.5 MMOL/L (ref 3.5–5.2)
PROT SERPL-MCNC: 6.5 G/DL (ref 6–8.5)
PROTHROMBIN TIME: 13.9 SECONDS (ref 12.2–14.5)
RBC # BLD AUTO: 4.39 10*6/MM3 (ref 3.77–5.28)
RETICS # AUTO: 0.08 10*6/MM3 (ref 0.02–0.13)
RETICS/RBC NFR AUTO: 1.74 % (ref 0.7–1.9)
SODIUM SERPL-SCNC: 137 MMOL/L (ref 136–145)
TIBC SERPL-MCNC: 365 MCG/DL (ref 298–536)
TRANSFERRIN SERPL-MCNC: 245 MG/DL (ref 200–360)
TRIGL SERPL-MCNC: 113 MG/DL (ref 0–150)
TSH SERPL DL<=0.05 MIU/L-ACNC: 0.73 UIU/ML (ref 0.27–4.2)
VIT B12 BLD-MCNC: 962 PG/ML (ref 211–946)
VLDLC SERPL-MCNC: 21 MG/DL (ref 5–40)
WBC NRBC COR # BLD AUTO: 5.77 10*3/MM3 (ref 3.4–10.8)

## 2024-11-21 PROCEDURE — 85610 PROTHROMBIN TIME: CPT

## 2024-11-21 PROCEDURE — 80050 GENERAL HEALTH PANEL: CPT

## 2024-11-21 PROCEDURE — 82043 UR ALBUMIN QUANTITATIVE: CPT

## 2024-11-21 PROCEDURE — 82728 ASSAY OF FERRITIN: CPT

## 2024-11-21 PROCEDURE — 86704 HEP B CORE ANTIBODY TOTAL: CPT

## 2024-11-21 PROCEDURE — 80061 LIPID PANEL: CPT

## 2024-11-21 PROCEDURE — 84466 ASSAY OF TRANSFERRIN: CPT

## 2024-11-21 PROCEDURE — 87340 HEPATITIS B SURFACE AG IA: CPT

## 2024-11-21 PROCEDURE — 86341 ISLET CELL ANTIBODY: CPT

## 2024-11-21 PROCEDURE — 83525 ASSAY OF INSULIN: CPT

## 2024-11-21 PROCEDURE — 82607 VITAMIN B-12: CPT

## 2024-11-21 PROCEDURE — 85045 AUTOMATED RETICULOCYTE COUNT: CPT

## 2024-11-21 PROCEDURE — 86706 HEP B SURFACE ANTIBODY: CPT

## 2024-11-21 PROCEDURE — 83540 ASSAY OF IRON: CPT

## 2024-11-21 PROCEDURE — 86708 HEPATITIS A ANTIBODY: CPT

## 2024-11-21 PROCEDURE — 82570 ASSAY OF URINE CREATININE: CPT

## 2024-11-21 PROCEDURE — 82746 ASSAY OF FOLIC ACID SERUM: CPT

## 2024-11-21 RX ORDER — BUPROPION HYDROCHLORIDE 150 MG/1
150 TABLET ORAL EVERY MORNING
Qty: 7 TABLET | Refills: 0 | Status: SHIPPED | OUTPATIENT
Start: 2024-11-21

## 2024-11-21 RX ORDER — BUPROPION HYDROCHLORIDE 300 MG/1
300 TABLET ORAL EVERY MORNING
Qty: 30 TABLET | Refills: 1 | Status: SHIPPED | OUTPATIENT
Start: 2024-11-21

## 2024-11-21 NOTE — PROGRESS NOTES
Follow Up Office Visit      Patient Name: Luz Elena Ballard  : 1970   MRN: [id number]     Referring Provider: Sal Astorga MD    Chief Complaint:      ICD-10-CM ICD-9-CM   1. Insomnia, unspecified type  G47.00 780.52   2. Moderate episode of recurrent major depressive disorder  F33.1 296.32        History of Present Illness:   Luz Elena Ballard is a 54 y.o. female who is here today for follow up and medication management    Subjective      Patient Reports:   History of Present Illness  The patient presents for evaluation of anxiety.    She has discontinued her use of Wellbutrin, which was previously taken at a dose of 150 mg. Since stopping the medication, she reports experiencing heightened anxiety and confusion, leading to difficulties in focusing on tasks. Additionally, she mentions an increase in forgetfulness. Her sleep duration is approximately 4 to 5 hours per night, but she does not report feeling fatigued during the day. She does not report any changes in her appetite or eating habits.    She continues to experience auditory hallucinations, hearing voices and seeing shadows, but these are not constant and only occur during periods of heightened anxiety.    She is currently taking Remeron 7.5 mg and Seroquel 25 mg, both of which were recently refilled. She would like to resume the Wellbutrin as she feels like it was beneficial and she is struggling some without it. She has been off Wellbutrin for about 2 weeks.    She is currently living with her mother and is considering moving into her own apartment to regain some independence.    Review of Systems:   Review of Systems   Constitutional:  Negative for appetite change and unexpected weight change.   Eyes:  Negative for visual disturbance.   Respiratory:  Negative for chest tightness.    Musculoskeletal:  Negative for gait problem.   Skin:  Negative for rash and wound.   Neurological:  Negative for tremors, seizures, weakness and  light-headedness.   Psychiatric/Behavioral:  Positive for decreased concentration, dysphoric mood and sleep disturbance. Negative for agitation, behavioral problems, hallucinations, self-injury and suicidal ideas. The patient is nervous/anxious. The patient is not hyperactive.      Sleep pattern: 4-5 hours  Appetite: no changes     PHQ-9 Depression Screening  Little interest or pleasure in doing things? Over half   Feeling down, depressed, or hopeless? Almost all   PHQ-2 Total Score 5   Trouble falling or staying asleep, or sleeping too much? Almost all   Feeling tired or having little energy? Almost all   Poor appetite or overeating? Almost all   Feeling bad about yourself - or that you are a failure or have let yourself or your family down? Over half   Trouble concentrating on things, such as reading the newspaper or watching television? Almost all   Moving or speaking so slowly that other people could have noticed? Or the opposite - being so fidgety or restless that you have been moving around a lot more than usual? Over half   Thoughts that you would be better off dead, or of hurting yourself in some way? Not at all   PHQ-9 Total Score 21   If you checked off any problems, how difficult have these problems made it for you to do your work, take care of things at home, or get along with other people? Extremely difficult       EFREM-7 Anxiety Screening  Over the last two weeks, how often have you been bothered by the following problems?  Feeling nervous, anxious or on edge: Nearly every day  Not being able to stop or control worrying: Nearly every day  Worrying too much about different things: Nearly every day  Trouble Relaxing: Nearly every day  Being so restless that it is hard to sit still: Nearly every day  Becoming easily annoyed or irritable: Nearly every day  Feeling afraid as if something awful might happen: More than half the days  EFREM 7 Total Score: 20  If you checked any problems, how difficult have these  problems made it for you to do your work, take care of things at home, or get along with other people: Extremely difficult    RISK ASSESSMENT:  Patient denies any thoughts or intent of suicide today. Patient denies any impulsive behavior today.     Medications:     Current Outpatient Medications:     azelastine (OPTIVAR) 0.05 % ophthalmic solution, Administer 1 drop to both eyes Daily As Needed (itches)., Disp: 6 mL, Rfl: 12    Budeson-Glycopyrrol-Formoterol (BREZTRI) 160-9-4.8 MCG/ACT aerosol inhaler, Inhale 2 puffs 2 (Two) Times a Day., Disp: 10.7 g, Rfl: 11    buprenorphine-naloxone (SUBOXONE) 8-2 MG film film, TAKE 2 FILMS UNDER THE TONGUE EVERY DAY, Disp: , Rfl:     buPROPion XL (WELLBUTRIN XL) 150 MG 24 hr tablet, Take 1 tablet by mouth Every Morning., Disp: 7 tablet, Rfl: 0    Cariprazine HCl (Vraylar) 1.5 MG capsule capsule, Take 1 capsule by mouth Daily., Disp: 60 capsule, Rfl: 0    carvedilol (Coreg) 6.25 MG tablet, Take 1 tablet by mouth 2 (Two) Times a Day With Meals., Disp: 60 tablet, Rfl: 11    Continuous Glucose Sensor (FreeStyle Maria C 3 Plus Sensor), Use Every 14 (Fourteen) Days., Disp: 6 each, Rfl: 3    Diclofenac Sodium (VOLTAREN) 1 % gel gel, 2 grams QID prn pain upper extremity joints and 4 grams QID pain lower extremity joints, Disp: 100 g, Rfl: 5    Insulin Glargine (LANTUS SOLOSTAR) 100 UNIT/ML injection pen, Inject 26 units twice daily, titrate for max daily dose of 100 units, Disp: , Rfl:     Insulin Lispro, 1 Unit Dial, (HumaLOG KwikPen) 100 UNIT/ML solution pen-injector, Inject 15 units with large meals, 10 units with normal meals and 5 units with small meals/snacks plus 3:50>150mg/dl CF; titrate for max daily dose of 100 units, Disp: , Rfl:     Insulin Pen Needle 32G X 4 MM misc, Use up to 5 times daily with insulin, Disp: 500 each, Rfl: 0    lidocaine (LIDODERM) 5 %, Place 1 patch on the skin as directed by provider Daily. Remove & Discard patch within 12 hours or as directed by MD,  "Disp: 30 each, Rfl: 11    lisinopril (PRINIVIL,ZESTRIL) 20 MG tablet, Take 1 tablet by mouth Daily. for blood pressure, Disp: 90 tablet, Rfl: 3    mirtazapine (REMERON) 7.5 MG tablet, Take 1 tablet by mouth Every Night., Disp: 30 tablet, Rfl: 2    ondansetron ODT (ZOFRAN-ODT) 4 MG disintegrating tablet, Place 1 tablet on the tongue Every 8 (Eight) Hours As Needed for Vomiting or Nausea., Disp: 30 tablet, Rfl: 3    pantoprazole (Protonix) 40 MG EC tablet, Take 1 tablet by mouth Daily., Disp: 30 tablet, Rfl: 11    QUEtiapine (SEROquel) 25 MG tablet, Take 1 tablet by mouth Every Night., Disp: 90 tablet, Rfl: 3    rosuvastatin (Crestor) 10 MG tablet, Take 1 tablet by mouth Daily., Disp: 90 tablet, Rfl: 3    buPROPion XL (Wellbutrin XL) 300 MG 24 hr tablet, Take 1 tablet by mouth Every Morning., Disp: 30 tablet, Rfl: 1    Medication Considerations:  BENY reviewed and appropriate.      Allergies:   Allergies   Allergen Reactions    Keflex [Cephalexin] GI Intolerance    Sulfamethoxazole-Trimethoprim Rash    Sulfa Antibiotics Rash     Sulfa drugs    Vancomycin Rash         Objective     Physical Exam:  Vital Signs:   Vitals:    11/21/24 1514 11/21/24 1516   BP:  116/72   Pulse:  69   SpO2:  96%   Weight: 71.2 kg (157 lb)    Height: 152.4 cm (60\")      Body mass index is 30.66 kg/m².     Mental Status Exam:   Hygiene:   good   Cooperation:  Cooperative  Eye Contact:  Good  Psychomotor Behavior:  Appropriate  Affect:  Appropriate  Mood: depressed and anxious  Speech:  Normal  Thought Process:  Goal directed and Linear  Thought Content:  Normal  Suicidal:  None  Homicidal:  None  Hallucinations:  Not demonstrated today  Delusion:  None  Memory:  Intact  Orientation:  Person, Place, Time, and Situation  Reliability:  good  Insight:  Good  Judgement:  Good  Impulse Control:  Good  Physical/Medical Issues:   see problem list      Assessment / Plan      Visit Diagnosis/Orders Placed This Visit:  Diagnoses and all orders for this " visit:    1. Insomnia, unspecified type    2. Moderate episode of recurrent major depressive disorder  -     buPROPion XL (WELLBUTRIN XL) 150 MG 24 hr tablet; Take 1 tablet by mouth Every Morning.  Dispense: 7 tablet; Refill: 0  -     buPROPion XL (Wellbutrin XL) 300 MG 24 hr tablet; Take 1 tablet by mouth Every Morning.  Dispense: 30 tablet; Refill: 1  -     Cariprazine HCl (Vraylar) 1.5 MG capsule capsule; Take 1 capsule by mouth Daily.  Dispense: 60 capsule; Refill: 0           Functional Status: No impairment    Prognosis: Good with Ongoing Treatment     Impression/Formulation:  Patient appeared alert and oriented.  Patient is voluntarily requesting to continue outpatient psychiatric treatment at [facility].  Patient is receptive to assistance with maintaining a stable lifestyle.  Patient presents with history of     ICD-10-CM ICD-9-CM   1. Insomnia, unspecified type  G47.00 780.52   2. Moderate episode of recurrent major depressive disorder  F33.1 296.32   .    Reviewed patient's previous provider notes. Reviewed most recent labs. Patient meets DSM V diagnostic criteria for diagnoses. Diagnoses may be updated as more information becomes available.     Assessment & Plan  1. Anxiety.  She has been experiencing increased anxiety, confusion, and trouble focusing since discontinuing Wellbutrin. She reports sleeping 4-5 hours per night but does not feel tired during the day. She continues to experience occasional voices and shadows, which are more prominent during stressful events.   A prescription for Wellbutrin 150 mg for 7 days has been issued, followed by an increase to 300 mg. She has been advised to monitor her sleep patterns and be vigilant for signs of irritability and agitation. The potential benefits of Wellbutrin in improving focus and concentration were discussed.   She is currently taking Remeron and Seroquel, which were recently refilled. A refill for Vraylar has also been  provided. If the increased dosage of Wellbutrin proves too potent, she is to inform the office so that the dosage can be adjusted accordingly.    Follow-up  Patient is scheduled for a follow-up visit in 6 weeks.    Treatment Plan:   Continue vraylar 1.5 mg  Resume wellbutrin xl 150 mg daily for 1 week and then 300 mg daily  Continue remeron and seroquel for sleep at this time  Patient will continue supportive psychotherapy efforts and medications as indicated. Clinic will obtain release of information for current treatment team for continuity of care as needed. Patient will contact this office, call 911 or present to the nearest emergency room should suicidal or homicidal ideations occur.  Discussed medication options and treatment plan of prescribed medication(s) as well as the risks, benefits, and potential side effects. Patient acknowledged and verbally consented to continue with current treatment plan and was educated on the importance of compliance with treatment and follow-up appointments.     Patient instructions:  All risks/benefits and side effects discussed with patient, including risk for weight gain, increased lipids, hyperprolactemia, EPS symptoms, including restlessness, muscle  stiffness or contraction of head, face, neck, trunk and limbs, and TD (which can be irreversible). Pt verbalizes understanding and consents to treatment with these medications.   Instructed will take 4-6 weeks for full therapeutic effect. Medication risks and side effects discussed with patient including risk for worsening mood, changes in behavior, thoughts of suicide or homicide, induction of blanche, serotonin syndrome.   If any thoughts of SI or HI, worsening mood or changes in behavior, call 911 or crisis line [phone], or go to nearest ER at once.  Pt.verbalizes understanding and consents to treatment with this medication.     Follow Up:   Return in about 6 weeks (around 1/2/2025) for Med Check.    Patient or patient  representative verbalized consent for the use of Ambient Listening during the visit with  GAGE Garza for chart documentation. 11/21/2024  17:26 EST    GAGE Garza, [facility]

## 2024-11-22 LAB
ALBUMIN UR-MCNC: 1.5 MG/DL
CREAT UR-MCNC: 195 MG/DL
MICROALBUMIN/CREAT UR: 7.7 MG/G (ref 0–29)

## 2024-11-23 LAB
HAV AB SER QL IA: POSITIVE
HBV CORE AB SERPL QL IA: NEGATIVE

## 2024-11-24 LAB — INSULIN SERPL-ACNC: 35.8 UIU/ML (ref 2.6–24.9)

## 2024-11-25 LAB — GAD65 AB SER IA-ACNC: <5 U/ML (ref 0–5)

## 2024-11-26 ENCOUNTER — TELEPHONE (OUTPATIENT)
Dept: INTERNAL MEDICINE | Facility: CLINIC | Age: 54
End: 2024-11-26
Payer: MEDICAID

## 2024-11-26 RX ORDER — SYRINGE-NEEDLE,INSULIN,0.5 ML 27GX1/2"
SYRINGE, EMPTY DISPOSABLE MISCELLANEOUS
Qty: 200 EACH | Refills: 1 | Status: SHIPPED | OUTPATIENT
Start: 2024-11-26

## 2024-11-26 NOTE — TELEPHONE ENCOUNTER
See MyChart message  The pharmacy gave the patient lantus vials instead of pens so she needs rx for syringes sent to  so she can use the vials  Rx sent

## 2024-11-26 NOTE — TELEPHONE ENCOUNTER
[facility] called and said the Lantus rx was sent in as vial instead of the  injection pen.  She is requesting insulin syringe and needles   When looking at chart it was Dr Rosenstein that sent in the rx.  Advised her to call his office and have them send in syringes/needles  Verbal understanding given

## 2024-12-02 ENCOUNTER — TELEPHONE (OUTPATIENT)
Dept: ENDOCRINOLOGY | Facility: CLINIC | Age: 54
End: 2024-12-02
Payer: MEDICAID

## 2024-12-02 RX ORDER — NEEDLES, SAFETY 18GX1 1/2"
NEEDLE, DISPOSABLE MISCELLANEOUS
Qty: 100 EACH | Refills: 1 | Status: SHIPPED | OUTPATIENT
Start: 2024-12-02

## 2024-12-30 DIAGNOSIS — R11.2 NAUSEA AND VOMITING, UNSPECIFIED VOMITING TYPE: ICD-10-CM

## 2024-12-30 RX ORDER — ONDANSETRON 4 MG/1
4 TABLET, ORALLY DISINTEGRATING ORAL EVERY 8 HOURS PRN
Qty: 30 TABLET | Refills: 3 | Status: SHIPPED | OUTPATIENT
Start: 2024-12-30

## 2024-12-30 NOTE — TELEPHONE ENCOUNTER
Caller: Luz Elena Ballard    Relationship: Self    Best call back number: [phone]     Requested Prescriptions:   Requested Prescriptions     Pending Prescriptions Disp Refills    ondansetron ODT (ZOFRAN-ODT) 4 MG disintegrating tablet 30 tablet 3     Sig: Place 1 tablet on the tongue Every 8 (Eight) Hours As Needed for Vomiting or Nausea.        Pharmacy where request should be sent: [address] [phone] [facility] [phone]      Last office visit with prescribing clinician: 11/4/2024   Last telemedicine visit with prescribing clinician: Visit date not found   Next office visit with prescribing clinician: 2/27/2025     Additional details provided by patient: PATIENT IS OUT.     Does the patient have less than a 3 day supply:  [x] Yes  [] No    Would you like a call back once the refill request has been completed: [] Yes [x] No    If the office needs to give you a call back, can they leave a voicemail: [] Yes [x] No    Cadance Dunaway, RegSched Rep   12/30/24 10:25 EST

## 2025-01-03 DIAGNOSIS — B18.2 CHRONIC HEPATITIS C WITHOUT HEPATIC COMA: Primary | ICD-10-CM

## 2025-01-14 ENCOUNTER — PATIENT ROUNDING (BHMG ONLY) (OUTPATIENT)
Dept: URGENT CARE | Facility: CLINIC | Age: 55
End: 2025-01-14
Payer: MEDICAID

## 2025-01-14 ENCOUNTER — OFFICE VISIT (OUTPATIENT)
Dept: BEHAVIORAL HEALTH | Facility: CLINIC | Age: 55
End: 2025-01-14
Payer: MEDICAID

## 2025-01-14 VITALS
WEIGHT: 166.4 LBS | BODY MASS INDEX: 32.67 KG/M2 | HEART RATE: 68 BPM | SYSTOLIC BLOOD PRESSURE: 142 MMHG | DIASTOLIC BLOOD PRESSURE: 84 MMHG | OXYGEN SATURATION: 97 % | HEIGHT: 60 IN

## 2025-01-14 DIAGNOSIS — G47.00 INSOMNIA, UNSPECIFIED TYPE: ICD-10-CM

## 2025-01-14 DIAGNOSIS — F33.1 MODERATE EPISODE OF RECURRENT MAJOR DEPRESSIVE DISORDER: ICD-10-CM

## 2025-01-14 PROCEDURE — 1160F RVW MEDS BY RX/DR IN RCRD: CPT | Performed by: REGISTERED NURSE

## 2025-01-14 PROCEDURE — 3077F SYST BP >= 140 MM HG: CPT | Performed by: REGISTERED NURSE

## 2025-01-14 PROCEDURE — 3079F DIAST BP 80-89 MM HG: CPT | Performed by: REGISTERED NURSE

## 2025-01-14 PROCEDURE — 99214 OFFICE O/P EST MOD 30 MIN: CPT | Performed by: REGISTERED NURSE

## 2025-01-14 PROCEDURE — 1159F MED LIST DOCD IN RCRD: CPT | Performed by: REGISTERED NURSE

## 2025-01-14 PROCEDURE — 96127 BRIEF EMOTIONAL/BEHAV ASSMT: CPT | Performed by: REGISTERED NURSE

## 2025-01-14 RX ORDER — BUPROPION HYDROCHLORIDE 300 MG/1
300 TABLET ORAL EVERY MORNING
Qty: 90 TABLET | Refills: 0 | Status: SHIPPED | OUTPATIENT
Start: 2025-01-14

## 2025-01-14 RX ORDER — MIRTAZAPINE 7.5 MG/1
7.5 TABLET, FILM COATED ORAL NIGHTLY
Qty: 90 TABLET | Refills: 0 | Status: SHIPPED | OUTPATIENT
Start: 2025-01-14

## 2025-01-14 NOTE — PROGRESS NOTES
Follow Up Office Visit      Patient Name: Luz Elena Ballard  : 1970   MRN: [id number]     Referring Provider: Sal Astorga MD    Chief Complaint:      ICD-10-CM ICD-9-CM   1. Moderate episode of recurrent major depressive disorder  F33.1 296.32   2. Insomnia, unspecified type  G47.00 780.52        History of Present Illness:   Luz Elena Ballard is a 54 y.o. female who is here today for follow up and medication management    Subjective      Patient Reports:   History of Present Illness  The patient presents for evaluation of weight gain, sleep issues, and medication management.    She reports a satisfactory response to the reintroduction of Wellbutrin into her treatment regimen. She has escalated the dosage to 300 mg without experiencing any suicidal ideation or homicidal thoughts. The frequency of auditory and visual hallucinations has decreased to a monthly occurrence. Her sleep pattern is gradually normalizing, with an average of 8 hours of sleep per night. She has been babysitting a lot, so she goes to bed at a certain time and gets up at a certain time. Her life is getting into a routine again. She does not pay attention to so much other stuff. She feels tired at night. She has abstained from using Remeron, which has resulted in some sleep disturbances. She occasionally attempts to discontinue one of her medications for sleep but finds it challenging due to fatigue. She finds Seroquel more effective than Remeron in inducing sleep.    She has observed a significant weight gain of approximately 10 pounds, which she attributes to her current life circumstances, but could also be related to vraylar. Despite not consuming excessive food, she has noticed this weight increase. She has made dietary modifications, including reducing her sugar intake.        Review of Systems:   Review of Systems   Constitutional:  Negative for appetite change and unexpected weight change.   Eyes:  Negative for visual  disturbance.   Respiratory:  Negative for chest tightness.    Cardiovascular:  Negative for chest pain and palpitations.   Musculoskeletal:  Negative for gait problem.   Skin:  Negative for rash and wound.   Neurological:  Negative for tremors, seizures, weakness and light-headedness.   Psychiatric/Behavioral:  Positive for sleep disturbance. Negative for agitation, behavioral problems, hallucinations, self-injury and suicidal ideas. The patient is not hyperactive.      Sleep pattern: 8 hours of sleep, with sleep medication  Appetite: has gained 10 pounds since starting vraylar    PHQ-9 Depression Screening  Little interest or pleasure in doing things? Over half   Feeling down, depressed, or hopeless? Over half   PHQ-2 Total Score 4   Trouble falling or staying asleep, or sleeping too much? Over half   Feeling tired or having little energy? Over half   Poor appetite or overeating? Over half   Feeling bad about yourself - or that you are a failure or have let yourself or your family down? Over half   Trouble concentrating on things, such as reading the newspaper or watching television? Almost all   Moving or speaking so slowly that other people could have noticed? Or the opposite - being so fidgety or restless that you have been moving around a lot more than usual? Several days   Thoughts that you would be better off dead, or of hurting yourself in some way? Not at all   PHQ-9 Total Score 16   If you checked off any problems, how difficult have these problems made it for you to do your work, take care of things at home, or get along with other people? Extremely difficult       EFREM-7 Anxiety Screening  Over the last two weeks, how often have you been bothered by the following problems?  Feeling nervous, anxious or on edge: More than half the days  Not being able to stop or control worrying: More than half the days  Worrying too much about different things: More than half the days  Trouble Relaxing: More than half the  days  Being so restless that it is hard to sit still: More than half the days  Becoming easily annoyed or irritable: More than half the days  Feeling afraid as if something awful might happen: Not at all  EFREM 7 Total Score: 12  If you checked any problems, how difficult have these problems made it for you to do your work, take care of things at home, or get along with other people: Very difficult    RISK ASSESSMENT:  Patient denies any thoughts or intent of suicide today. Patient denies any impulsive behavior today.     Medications:     Current Outpatient Medications:     azelastine (OPTIVAR) 0.05 % ophthalmic solution, Administer 1 drop to both eyes Daily As Needed (itches)., Disp: 6 mL, Rfl: 12    Budeson-Glycopyrrol-Formoterol (BREZTRI) 160-9-4.8 MCG/ACT aerosol inhaler, Inhale 2 puffs 2 (Two) Times a Day., Disp: 10.7 g, Rfl: 11    buprenorphine-naloxone (SUBOXONE) 8-2 MG film film, TAKE 2 FILMS UNDER THE TONGUE EVERY DAY, Disp: , Rfl:     buPROPion XL (Wellbutrin XL) 300 MG 24 hr tablet, Take 1 tablet by mouth Every Morning., Disp: 90 tablet, Rfl: 0    Cariprazine HCl (Vraylar) 1.5 MG capsule capsule, Take 1 capsule by mouth Daily., Disp: 90 capsule, Rfl: 0    carvedilol (Coreg) 6.25 MG tablet, Take 1 tablet by mouth 2 (Two) Times a Day With Meals., Disp: 60 tablet, Rfl: 11    Continuous Glucose Sensor (FreeStyle Maria C 3 Plus Sensor), Use Every 14 (Fourteen) Days., Disp: 6 each, Rfl: 3    Diclofenac Sodium (VOLTAREN) 1 % gel gel, 2 grams QID prn pain upper extremity joints and 4 grams QID pain lower extremity joints, Disp: 100 g, Rfl: 5    Insulin Glargine (LANTUS SOLOSTAR) 100 UNIT/ML injection pen, Inject 26 units twice daily, titrate for max daily dose of 100 units, Disp: , Rfl:     Insulin Lispro, 1 Unit Dial, (HumaLOG KwikPen) 100 UNIT/ML solution pen-injector, Inject 15 units with large meals, 10 units with normal meals and 5 units with small meals/snacks plus 3:50>150mg/dl CF; titrate for max daily dose  "of 100 units, Disp: , Rfl:     Insulin Pen Needle 32G X 4 MM misc, Use up to 5 times daily with insulin, Disp: 500 each, Rfl: 0    Insulin Syringe-Needle U-100 (BD Insulin Syringe U/F) 31G X 5/16\" 1 ML misc, Use 2 per day to administer insulin, Disp: 200 each, Rfl: 1    lidocaine (LIDODERM) 5 %, Place 1 patch on the skin as directed by provider Daily. Remove & Discard patch within 12 hours or as directed by MD, Disp: 30 each, Rfl: 11    Lidocaine Viscous HCl (XYLOCAINE) 2 % solution, Take 10 mL by mouth 3 (Three) Times a Day As Needed for Mild Pain or Moderate Pain for up to 7 days., Disp: 100 mL, Rfl: 0    lisinopril (PRINIVIL,ZESTRIL) 20 MG tablet, Take 1 tablet by mouth Daily. for blood pressure, Disp: 90 tablet, Rfl: 3    mirtazapine (REMERON) 7.5 MG tablet, Take 1 tablet by mouth Every Night., Disp: 90 tablet, Rfl: 0    ondansetron ODT (ZOFRAN-ODT) 4 MG disintegrating tablet, Place 1 tablet on the tongue Every 8 (Eight) Hours As Needed for Vomiting or Nausea., Disp: 30 tablet, Rfl: 3    pantoprazole (Protonix) 40 MG EC tablet, Take 1 tablet by mouth Daily., Disp: 30 tablet, Rfl: 11    predniSONE (DELTASONE) 20 MG tablet, Take 2 tablets by mouth Daily for 5 days., Disp: 10 tablet, Rfl: 0    QUEtiapine (SEROquel) 25 MG tablet, Take 1 tablet by mouth Every Night., Disp: 90 tablet, Rfl: 3    rosuvastatin (Crestor) 10 MG tablet, Take 1 tablet by mouth Daily., Disp: 90 tablet, Rfl: 3    Syringe/Needle, Disp, (B-D ECLIPSE SYRINGE) 30G X 1/2\" 1 ML misc, Use 2 per day to administer insulin, Disp: 100 each, Rfl: 1    Medication Considerations:  BENY reviewed and appropriate.      Allergies:   Allergies   Allergen Reactions    Keflex [Cephalexin] GI Intolerance    Sulfamethoxazole-Trimethoprim Rash    Amoxicillin Rash    Sulfa Antibiotics Rash     Sulfa drugs    Vancomycin Rash        Objective     Physical Exam:  Vital Signs:   Vitals:    01/14/25 1637 01/14/25 1639 01/14/25 1641   BP:   142/84   Pulse:   68   SpO2:   " "97%   Weight:  75.5 kg (166 lb 6.4 oz)    Height: 152.4 cm (60\")       Body mass index is 32.5 kg/m².     Mental Status Exam:   Hygiene:   good   Cooperation:  Cooperative  Eye Contact:  Good  Psychomotor Behavior:  Appropriate  Affect:  Appropriate  Mood: normal  Speech:  Normal  Thought Process:  Goal directed and Linear  Thought Content:  Normal  Suicidal:  None  Homicidal:  None  Hallucinations:  Not demonstrated today  Delusion:  None  Memory:  Intact  Orientation:  Person, Place, Time, and Situation  Reliability:  good  Insight:  Good  Judgement:  Good  Impulse Control:  Good  Physical/Medical Issues:   see problem list      Assessment / Plan      Visit Diagnosis/Orders Placed This Visit:  Diagnoses and all orders for this visit:    1. Moderate episode of recurrent major depressive disorder  -     buPROPion XL (Wellbutrin XL) 300 MG 24 hr tablet; Take 1 tablet by mouth Every Morning.  Dispense: 90 tablet; Refill: 0  -     Cariprazine HCl (Vraylar) 1.5 MG capsule capsule; Take 1 capsule by mouth Daily.  Dispense: 90 capsule; Refill: 0    2. Insomnia, unspecified type  -     mirtazapine (REMERON) 7.5 MG tablet; Take 1 tablet by mouth Every Night.  Dispense: 90 tablet; Refill: 0             Functional Status: No impairment    Prognosis: Good with Ongoing Treatment     Impression/Formulation:  Patient appeared alert and oriented.  Patient is voluntarily requesting to continue outpatient psychiatric treatment at [facility].  Patient is receptive to assistance with maintaining a stable lifestyle.  Patient presents with history of     ICD-10-CM ICD-9-CM   1. Moderate episode of recurrent major depressive disorder  F33.1 296.32   2. Insomnia, unspecified type  G47.00 780.52   .    Reviewed patient's previous provider notes. Reviewed most recent labs. Patient meets DSM V diagnostic criteria for diagnoses. Diagnoses may be updated as more information becomes available.     Assessment & " Plan    She has gained approximately 10 pounds despite not significantly increasing her food intake. The potential side effect of weight gain from Vraylar was discussed, although it is not commonly associated with this medication. She is advised to monitor her diet and physical activity levels.      She reports that her sleep pattern is becoming more regular, but she still experiences occasional restlessness at night. She finds Seroquel 25 mg more effective for sleep compared to Remeron 7.5 mg. She is advised to continue using both medications as needed and to adjust based on her daily activities and fatigue levels.    Prescriptions for Wellbutrin 300 mg, Vraylar 1.5 mg, and Remeron 7.5 mg have been renewed for a 90-day supply and sent to [facility] pharmacy in [place]. She is advised to call if she needs a refill of Seroquel.    Follow-up  The patient will follow up in 3 months or sooner if needed.    Treatment Plan:   Continue current medication regimen      Patient will continue supportive psychotherapy efforts and medications as indicated. Clinic will obtain release of information for current treatment team for continuity of care as needed. Patient will contact this office, call 911 or present to the nearest emergency room should suicidal or homicidal ideations occur.  Discussed medication options and treatment plan of prescribed medication(s) as well as the risks, benefits, and potential side effects. Patient acknowledged and verbally consented to continue with current treatment plan and was educated on the importance of compliance with treatment and follow-up appointments.     Patient instructions:  All risks/benefits and side effects discussed with patient, including risk for weight gain, increased lipids, hyperprolactemia, EPS symptoms, including restlessness, muscle  stiffness or contraction of head, face, neck, trunk and limbs, and TD (which can be irreversible). Pt verbalizes understanding and  consents to treatment with these medications.     Follow Up:   Return in about 3 months (around 4/14/2025) for Med Check.    Patient or patient representative verbalized consent for the use of Ambient Listening during the visit with  GAGE Garza for chart documentation. 1/14/2025  17:18 EST    GAGE Garza, PMHNP-BC [facility]

## 2025-01-14 NOTE — ED NOTES
Thank you for letting us care for you in your recent visit to our urgent care center. We would love to hear about your experience with us. Was this the first time you have visited our location?    We’re always looking for ways to make our patients’ experiences even better. Do you have any recommendations on ways we may improve?     I appreciate you taking the time to respond. Please be on the lookout for a survey about your recent visit from [employer] via text or email. We would greatly appreciate if you could fill that out and turn it back in. We want your voice to be heard and we value your feedback.   Thank you for choosing [facility] for your healthcare needs.

## 2025-01-20 ENCOUNTER — TELEPHONE (OUTPATIENT)
Dept: BEHAVIORAL HEALTH | Facility: CLINIC | Age: 55
End: 2025-01-20
Payer: MEDICAID

## 2025-01-20 DIAGNOSIS — G47.00 INSOMNIA, UNSPECIFIED TYPE: ICD-10-CM

## 2025-01-20 RX ORDER — QUETIAPINE FUMARATE 25 MG/1
25 TABLET, FILM COATED ORAL NIGHTLY
Qty: 90 TABLET | Refills: 1 | Status: SHIPPED | OUTPATIENT
Start: 2025-01-20

## 2025-01-20 NOTE — TELEPHONE ENCOUNTER
PATIENT CALLED REQUESTING REFILL OF QUEtiapine (SEROquel) 25 MG tablet    PATIENT HAD APPT LAST WEEK AND COULDN'T REMEMBER IF SHE NEEDED REFILLS OR NOT.  STILL HAS A COUPLE DAYS LEFT OF MEDICATION.     PHARMACY:[facility] [phone]    CALL PATIENT BACK:[phone]

## 2025-02-03 PROBLEM — R60.0 EDEMA LEG: Status: ACTIVE | Noted: 2025-02-03

## 2025-02-03 PROBLEM — R94.31 ABNORMAL EKG: Status: ACTIVE | Noted: 2025-02-03

## 2025-02-06 DIAGNOSIS — R11.2 NAUSEA AND VOMITING, UNSPECIFIED VOMITING TYPE: ICD-10-CM

## 2025-02-06 RX ORDER — ONDANSETRON 4 MG/1
4 TABLET, ORALLY DISINTEGRATING ORAL EVERY 8 HOURS PRN
Qty: 30 TABLET | Refills: 3 | Status: SHIPPED | OUTPATIENT
Start: 2025-02-06

## 2025-02-11 DIAGNOSIS — E11.65 TYPE 2 DIABETES MELLITUS WITH HYPERGLYCEMIA, WITHOUT LONG-TERM CURRENT USE OF INSULIN: ICD-10-CM

## 2025-02-11 RX ORDER — ROSUVASTATIN CALCIUM 10 MG/1
10 TABLET, COATED ORAL DAILY
Qty: 90 TABLET | Refills: 3 | Status: SHIPPED | OUTPATIENT
Start: 2025-02-11

## 2025-02-11 NOTE — TELEPHONE ENCOUNTER
Caller: Luz Elena Ballard    Relationship: Self    Best call back number: [phone]     Requested Prescriptions:   Requested Prescriptions     Pending Prescriptions Disp Refills    rosuvastatin (Crestor) 10 MG tablet 90 tablet 3     Sig: Take 1 tablet by mouth Daily.        Pharmacy where request should be sent: [address] [phone] [facility] [phone]      Last office visit with prescribing clinician: 11/4/2024   Last telemedicine visit with prescribing clinician: Visit date not found   Next office visit with prescribing clinician: 2/27/2025     Additional details provided by patient:     Does the patient have less than a 3 day supply:  [] Yes  [x] No    Would you like a call back once the refill request has been completed: [] Yes [x] No    If the office needs to give you a call back, can they leave a voicemail: [] Yes [x] No    Cadance Dunaway, RegSched Rep   02/11/25 10:54 EST

## 2025-02-27 ENCOUNTER — OFFICE VISIT (OUTPATIENT)
Dept: INTERNAL MEDICINE | Facility: CLINIC | Age: 55
End: 2025-02-27
Payer: MEDICAID

## 2025-02-27 VITALS
WEIGHT: 162 LBS | HEIGHT: 60 IN | TEMPERATURE: 98.6 F | DIASTOLIC BLOOD PRESSURE: 78 MMHG | BODY MASS INDEX: 31.8 KG/M2 | HEART RATE: 68 BPM | SYSTOLIC BLOOD PRESSURE: 124 MMHG | RESPIRATION RATE: 18 BRPM

## 2025-02-27 DIAGNOSIS — G89.29 CHRONIC RIGHT HIP PAIN: ICD-10-CM

## 2025-02-27 DIAGNOSIS — I10 ESSENTIAL HYPERTENSION: ICD-10-CM

## 2025-02-27 DIAGNOSIS — R29.898 WEAKNESS OF BOTH LOWER EXTREMITIES: ICD-10-CM

## 2025-02-27 DIAGNOSIS — M25.551 CHRONIC RIGHT HIP PAIN: ICD-10-CM

## 2025-02-27 DIAGNOSIS — F33.1 MODERATE EPISODE OF RECURRENT MAJOR DEPRESSIVE DISORDER: ICD-10-CM

## 2025-02-27 DIAGNOSIS — Z79.4 TYPE 2 DIABETES MELLITUS WITH HYPERGLYCEMIA, WITH LONG-TERM CURRENT USE OF INSULIN: ICD-10-CM

## 2025-02-27 DIAGNOSIS — Z00.00 ROUTINE HEALTH MAINTENANCE: Primary | ICD-10-CM

## 2025-02-27 DIAGNOSIS — Z12.12 ENCOUNTER FOR COLORECTAL CANCER SCREENING: ICD-10-CM

## 2025-02-27 DIAGNOSIS — Z72.0 TOBACCO ABUSE: ICD-10-CM

## 2025-02-27 DIAGNOSIS — Z12.31 BREAST CANCER SCREENING BY MAMMOGRAM: ICD-10-CM

## 2025-02-27 DIAGNOSIS — F41.1 GAD (GENERALIZED ANXIETY DISORDER): Chronic | ICD-10-CM

## 2025-02-27 DIAGNOSIS — K74.60 CIRRHOSIS OF LIVER WITHOUT ASCITES, UNSPECIFIED HEPATIC CIRRHOSIS TYPE: ICD-10-CM

## 2025-02-27 DIAGNOSIS — E66.811 CLASS 1 OBESITY DUE TO EXCESS CALORIES WITH SERIOUS COMORBIDITY AND BODY MASS INDEX (BMI) OF 31.0 TO 31.9 IN ADULT: ICD-10-CM

## 2025-02-27 DIAGNOSIS — E11.65 TYPE 2 DIABETES MELLITUS WITH HYPERGLYCEMIA, WITH LONG-TERM CURRENT USE OF INSULIN: ICD-10-CM

## 2025-02-27 DIAGNOSIS — E66.09 CLASS 1 OBESITY DUE TO EXCESS CALORIES WITH SERIOUS COMORBIDITY AND BODY MASS INDEX (BMI) OF 31.0 TO 31.9 IN ADULT: ICD-10-CM

## 2025-02-27 DIAGNOSIS — Z12.11 ENCOUNTER FOR COLORECTAL CANCER SCREENING: ICD-10-CM

## 2025-02-27 DIAGNOSIS — Z13.6 SCREENING FOR ISCHEMIC HEART DISEASE: ICD-10-CM

## 2025-02-27 RX ORDER — AMMONIUM LACTATE 12 G/100G
CREAM TOPICAL
COMMUNITY
Start: 2025-02-12

## 2025-02-27 NOTE — PROGRESS NOTES
Female Physical Note      Date: 2025   Patient Name: Luz Elena Ballard  : 1970   MRN: [id number]     Chief Complaint:    Chief Complaint   Patient presents with    Annual Exam     Pt is not fasting     Hip Pain       History of Present Illness: Luz Elena Ballard is a 54 y.o. female  with history of hep C related cirrhosis (Child Shay Class A), esophageal varicies, HTN, HLD, T2DM tobacco dependence (prior cigarette, current pipe) anxiety and depression who is here today for their annual health maintenance and physical.    HPI  History of Present Illness  The patient presents for an annual checkup.     She reports persistent discomfort in her right hip, characterized by aching pain that disrupts her sleep, necessitating frequent position changes. She does not experience any popping sensations or instability in the hip. She recalls a fall on her left side, which has resulted in tenderness in the area.    She experiences episodes of leg weakness, leading to falls, including one incident at a store where she was unable to regain her footing without assistance. These episodes are accompanied by dizziness and disorientation, which are transient in nature. She also reports occasional hypoglycemic episodes with blood sugar levels dropping to 80, and infrequent hyperglycemic episodes with levels reaching 300. Her current medication regimen includes Lantus 10 units twice daily, with no short-acting insulin. She has not undergone a diabetic eye exam this year due to insurance issues but has consulted a podiatrist and uses shoe insoles. She has not seen a gastroenterologist recently and expresses reluctance towards undergoing a colonoscopy due to fear of IV placement and previous adverse reactions to the preparatory drink. She has missed a scheduled appointment with a gastroenterologist. She has been advised to undergo regular liver ultrasounds and AFP tests every 6 months. She has recently visited a dentist  and reports improvements in her diet and exercise habits. She does not require any medication refills at this time.    She reports a history of smoking cigarettes from the age of 16 until 5 years ago, consuming half a pack per day. She currently smokes a pipe and is not interested in stopping.    She has experienced weight loss of approximately 5 pounds over the past month. She is up to date on her vaccinations. She has had a hysterectomy with removal of the cervix and does not need Pap smears. She has an order for a mammogram.    SOCIAL HISTORY  The patient is currently smoking a pipe and is a former cigarette smoker, having smoked half a pack a day from age 16 until about 5 years ago.            T2DM  - was taking lantus 25mg BID  - Endo: I personally reviewed note dated 11/8/24 by Dr. Kyle Rosenstein of Endocrinology. At that time adjusted lantus to 20u BID. Started humalog 12u with large meals, 8u with normal mealls, 4 u with small meals/snack. Plus 2u for every 50 mg above glucose of 150. Recommended 3 month f/u.   Did not keep f/u on 2/4/25 with endo, is rescheduled for 5/9/25      Anxiety  - I personally reviewed note by behavioral health Antonia ALMONTE dated 1/14/25. At that time continued on wellbutrin 300mg, vraylar 1.5mg and remeron 7.5mg. f/u 3 months.     Chronic Hep C  Cirrhosis  - has not seen GI since 2/28/24, needs follow-up    Subjective      Review of Systems:   Review of Systems    Past Medical History, Social History, Family History and Care Team were all reviewed with patient and updated as appropriate.     Medications:     Current Outpatient Medications:     ammonium lactate (AMLACTIN) 12 % cream, APPLY TOPICALLY TO THE AFFECTED AREA OF LEFT AND RIGHT FEET, HEELS, LEGS, Disp: , Rfl:     azelastine (OPTIVAR) 0.05 % ophthalmic solution, Administer 1 drop to both eyes Daily As Needed (itches)., Disp: 6 mL, Rfl: 12    Budeson-Glycopyrrol-Formoterol (BREZTRI) 160-9-4.8 MCG/ACT aerosol inhaler,  Inhale 2 puffs 2 (Two) Times a Day., Disp: 10.7 g, Rfl: 11    buprenorphine-naloxone (SUBOXONE) 8-2 MG film film, TAKE 2 FILMS UNDER THE TONGUE EVERY DAY, Disp: , Rfl:     buPROPion XL (Wellbutrin XL) 300 MG 24 hr tablet, Take 1 tablet by mouth Every Morning., Disp: 90 tablet, Rfl: 0    Cariprazine HCl (Vraylar) 1.5 MG capsule capsule, Take 1 capsule by mouth Daily., Disp: 90 capsule, Rfl: 0    carvedilol (Coreg) 6.25 MG tablet, Take 1 tablet by mouth 2 (Two) Times a Day With Meals., Disp: 60 tablet, Rfl: 11    Continuous Glucose Sensor (FreeStyle Maria C 3 Plus Sensor), Use Every 14 (Fourteen) Days., Disp: 6 each, Rfl: 3    Diclofenac Sodium (VOLTAREN) 1 % gel gel, 2 grams QID prn pain upper extremity joints and 4 grams QID pain lower extremity joints, Disp: 100 g, Rfl: 5    Insulin Glargine (LANTUS SOLOSTAR) 100 UNIT/ML injection pen, Inject 10 units twice daily, titrate for max daily dose of 100 units, Disp: , Rfl:     Insulin Pen Needle 32G X 4 MM misc, Use up to 5 times daily with insulin, Disp: 500 each, Rfl: 0    lidocaine (LIDODERM) 5 %, Place 1 patch on the skin as directed by provider Daily. Remove & Discard patch within 12 hours or as directed by MD, Disp: 30 each, Rfl: 11    lisinopril (PRINIVIL,ZESTRIL) 20 MG tablet, Take 1 tablet by mouth Daily. for blood pressure, Disp: 90 tablet, Rfl: 3    mirtazapine (REMERON) 7.5 MG tablet, Take 1 tablet by mouth Every Night., Disp: 90 tablet, Rfl: 0    ondansetron ODT (ZOFRAN-ODT) 4 MG disintegrating tablet, Place 1 tablet on the tongue Every 8 (Eight) Hours As Needed for Vomiting or Nausea., Disp: 30 tablet, Rfl: 3    pantoprazole (Protonix) 40 MG EC tablet, Take 1 tablet by mouth Daily., Disp: 30 tablet, Rfl: 11    QUEtiapine (SEROquel) 25 MG tablet, Take 1 tablet by mouth Every Night., Disp: 90 tablet, Rfl: 1    rosuvastatin (Crestor) 10 MG tablet, Take 1 tablet by mouth Daily., Disp: 90 tablet, Rfl: 3    Allergies:   Allergies   Allergen Reactions    Keflex  [Cephalexin] GI Intolerance    Sulfamethoxazole-Trimethoprim Rash    Amoxicillin Rash    Augmentin [Amoxicillin-Pot Clavulanate] Nausea Only and Rash    Sulfa Antibiotics Rash     Sulfa drugs    Vancomycin Rash       Immunizations:  Td/Tdap(Booster Q 10 yrs):  UTD  Flu (Yearly):  UTD  Pneumonia: UTD  Shingrix: UTD  Immunization History   Administered Date(s) Administered    COVID-19 (ARCENIO) 08/24/2021    COVID-19 (PFIZER) 12YRS+ (COMIRNATY) 03/13/2024, 11/04/2024    Flu Vaccine Intradermal Quad 18-64YR 11/30/2018, 11/30/2018    Fluzone  >6mos 11/04/2024    Fluzone (or Fluarix & Flulaval for VFC) >6mos 03/13/2024    Fluzone Quad >6mos (Multi-dose) 02/22/2016    Hepatitis B 07/27/1998, 08/27/1998, 12/29/1998    Pneumococcal Conjugate 20-Valent (PCV20) 02/15/2024    Pneumococcal Polysaccharide (PPSV23) 02/22/2016    Shingrix 03/08/2024, 04/24/2024    Tdap 07/14/2014       Colorectal Screening:    prevously refused.  Once again discussed risk and benefits.  Patient continues to refuse.  Recommend following up with gastro for further discussion  Last Completed Colonoscopy       This patient has no relevant Health Maintenance data.          Pap:  NA s/p total hysterectomy  Last Completed Pap Smear            Discontinued - PAP SMEAR  Discontinued      02/21/2017  Declined - Had hysterectomy                   Mammogram:  due  Last Completed Mammogram       This patient has no relevant Health Maintenance data.             CT for Smoker (Age 50-80, 20 pk yr): Due quit 2023. 35.5 pack year history (still some pipe)    Hep C (Age 18-79 once):  + Hep c, seeing GI  HIV (Age 15-65 once): NR  A1c: due  Lipid panel: last 11/21/24  The ASCVD Risk score (Noemi GALLOWAY, et al., 2019) failed to calculate for the following reasons:    Risk score cannot be calculated because patient has a medical history suggesting prior/existing ASCVD      Ophthalmologist: due  Dentist: regularly    Tobacco Use: Medium Risk (2/27/2025)    Patient History  "    Smoking Tobacco Use: Former     Smokeless Tobacco Use: Never     Passive Exposure: Past       Social History     Substance and Sexual Activity   Alcohol Use Not Currently    Alcohol/week: 7.0 standard drinks of alcohol    Comment: Not now, in past        Social History     Substance and Sexual Activity   Drug Use Not Currently    Frequency: 7.0 times per week    Types: Amphetamines, Fentanyl, Heroin, Methamphetamines    Comment: Not currently using        Diet/Physical activity:   Doing better with low-carb diet.  Walking regularly.      Menopause: post surgical    PHQ-2 Depression Screening  Little interest or pleasure in doing things? Several days   Feeling down, depressed, or hopeless? Not at all   PHQ-2 Total Score 1                 Objective     Physical Exam:  Vital Signs:   Vitals:    02/27/25 1434   BP: 124/78   BP Location: Left arm   Patient Position: Sitting   Cuff Size: Adult   Pulse: 68   Resp: 18   Temp: 98.6 °F (37 °C)   TempSrc: Infrared   Weight: 73.5 kg (162 lb)   Height: 152.4 cm (60\")   PainSc: 3      Body mass index is 31.64 kg/m².     Physical Exam  Vitals reviewed.   Constitutional:       General: She is not in acute distress.     Appearance: Normal appearance. She is obese. She is not ill-appearing or toxic-appearing.   HENT:      Head: Normocephalic and atraumatic.      Right Ear: External ear normal.      Left Ear: External ear normal.      Nose: Nose normal. No congestion.      Mouth/Throat:      Mouth: Mucous membranes are moist.   Eyes:      General: No scleral icterus.     Extraocular Movements: Extraocular movements intact.   Cardiovascular:      Rate and Rhythm: Normal rate and regular rhythm.      Pulses: Normal pulses.      Heart sounds: Murmur (II/VI systolic flow murmur across precodium, best appreicated at RUSB, stable) heard.      No friction rub. No gallop.   Pulmonary:      Effort: Pulmonary effort is normal. No respiratory distress.      Breath sounds: Normal breath " sounds. No stridor. No wheezing, rhonchi or rales.   Abdominal:      General: Abdomen is flat. Bowel sounds are normal. There is distension.      Palpations: Abdomen is soft. There is no mass.      Tenderness: There is no abdominal tenderness. There is no guarding or rebound.      Hernia: No hernia is present.   Musculoskeletal:         General: Tenderness (over greater trochanters b/l) present. No swelling. Normal range of motion.      Cervical back: Normal range of motion. No rigidity.      Comments: Pain over right greater trochanter. Mild pain with internal rotation of right hip. Negative straight leg raise b/l. Strength 5/5 in prox and distal LE b/l. 2+ achilles reflexes b/l 1+ patellar reflexes   Skin:     General: Skin is warm and dry.      Capillary Refill: Capillary refill takes less than 2 seconds.   Neurological:      General: No focal deficit present.      Mental Status: She is alert and oriented to person, place, and time.   Psychiatric:         Mood and Affect: Mood normal.         Behavior: Behavior normal.         Judgment: Judgment normal.       Physical Exam        Procedures    Results        Assessment / Plan      Assessment/Plan:   Problem List Items Addressed This Visit       EFREM (generalized anxiety disorder) (Chronic)    Current Assessment & Plan     Chronic and improving with treatment, continue follow-up with behavioral health         Essential hypertension    Current Assessment & Plan     Hypertension is stable and controlled  Continue current treatment regimen.  Blood pressure will be reassessed in 6 months.  Continue lisinopril 20 mg once daily.  Continue Coreg 6.25 mg twice daily.  Counseled on monitoring for dizziness and palpitations in setting of falls.  Check blood pressure at home.         Chronic right hip pain    Current Assessment & Plan     Prior x-rays unremarkable.  Pain over greater trochanter.  Will refer to orthopedic surgery         Relevant Orders    Ambulatory Referral  to Orthopedic Surgery    Tobacco abuse    Relevant Orders     CT Chest Low Dose Cancer Screening WO    Cirrhosis of liver without ascites    Overview     - Child-Shay score 2/16/24: 6 points, Class A, MELD-Na 21 points, 7-10% estimated 90day mortality         Current Assessment & Plan     Secondary to untreated hepatitis C.  Previously followed with gastroenterology, recommend patient make follow-up appointment.  Will obtain AFP today for hepatocellular cancer screening.         Relevant Orders    AFP Tumor Marker    Type 2 diabetes mellitus with hyperglycemia, with long-term current use of insulin    Current Assessment & Plan     -Diabetes is uncontrolled due to hyperglycemia  -Complications include diabetic polyneuropathy  -Recommend improve glycemic control to prevent progression of known complications and new complications arising  -Due to decompensated cirrhosis.,  Contraindicated metformin  -Currently taking Lantus 10 units twice daily, no short acting  -Continue CGM  -Taking ACEi/yes    DM Health Maintenance:  Ophtho: 1/2024, denies retinopathy.  Provided referral for new eye exam  Monofilament / Foot exam: done 4/19/2024   Lipids/Statin: taking a statin with last FLP showing LDL 71 done 2/15/2024            Relevant Medications    Insulin Glargine (LANTUS SOLOSTAR) 100 UNIT/ML injection pen    Other Relevant Orders    Hemoglobin A1c    Ambulatory Referral to Ophthalmology    Moderate episode of recurrent major depressive disorder    Current Assessment & Plan     Scratch that chronic, improving with treatment.  Follow-up with behavioral health          Other Visit Diagnoses       Routine health maintenance    -  Primary    Relevant Orders    CBC w AUTO Differential    Comprehensive metabolic panel    Screening for ischemic heart disease        Relevant Orders    Lipid panel    Encounter for colorectal cancer screening        Class 1 obesity due to excess calories with serious comorbidity and body mass index  (BMI) of 31.0 to 31.9 in adult        Breast cancer screening by mammogram        Relevant Orders    Mammo Screening Digital Tomosynthesis Bilateral With CAD    Weakness of both lower extremities              Assessment & Plan  1. Right hip pain.  The patient reports persistent right hip pain, which is exacerbated by sleeping on it and causes aching pains. She has tenderness upon palpation and slight pain when the leg is rolled. A referral to an orthopedist will be initiated for further evaluation and management.    2. Falls.  The patient experiences falls approximately twice a month, often accompanied by dizziness and disorientation. She reports her legs giving out, leading to falls. Physical therapy was discussed but declined at this time. The orthopedist may provide further recommendations for improving leg strength and balance.        Health Maintenance.  A mammogram will be ordered. A low-dose CT scan of the chest will be ordered for lung cancer screening due to her history of smoking. Basic labs, including AFP for liver monitoring, will be ordered. The patient is up to date on vaccinations.    Follow-up  The patient will follow up in 6 months.    Luz Elena Baltazar Elio  reports that she has quit smoking. Her smoking use included pipe and cigarettes. She started smoking about 28 years ago. She has a 14.1 pack-year smoking history. She has been exposed to tobacco smoke. She has never used smokeless tobacco. I have educated her on the risk of diseases from using tobacco products such as cancer, COPD, and heart disease.     I advised her to quit and she is not willing to quit.    I spent 3  minutes counseling the patient.          Healthcare Maintenance:  Counseling provided based on age appropriate USPSTF guidelines.  BMI is >= 30 and <35. (Class 1 Obesity). The following options were offered after discussion;: exercise counseling/recommendations and nutrition counseling/recommendations    Luz Elena Ballard voices  understanding and acceptance of this advice and will call back with any further questions or concerns. AVS with preventive healthcare tips printed for patient.         Follow Up:   Return in about 6 months (around 8/27/2025) for Recheck hypertension, osteoporosis.      Sal Astorga MD   [facility] [place] [facility] Solorzano    Patient or patient representative verbalized consent for the use of Ambient Listening during the visit with  Sal Astorga MD for chart documentation. 2/27/2025  17:26 EST

## 2025-02-27 NOTE — PATIENT INSTRUCTIONS
Health Maintenance, Female  Adopting a healthy lifestyle and getting preventive care can go a long way to promote health and wellness. Talk with your health care provider about what schedule of regular examinations is right for you. This is a good chance for you to check in with your provider about disease prevention and staying healthy.  In between checkups, there are plenty of things you can do on your own. Experts have done a lot of research about which lifestyle changes and preventive measures are most likely to keep you healthy. Ask your health care provider for more information.  Weight and diet  Eat a healthy diet  Be sure to include plenty of vegetables, fruits, low-fat dairy products, and lean protein.  Do not eat a lot of foods high in solid fats, added sugars, or salt.  Get regular exercise. This is one of the most important things you can do for your health.  Most adults should exercise for at least 150 minutes each week. The exercise should increase your heart rate and make you sweat (moderate-intensity exercise).  Most adults should also do strengthening exercises at least twice a week. This is in addition to the moderate-intensity exercise.     Maintain a healthy weight  Body mass index (BMI) is a measurement that can be used to identify possible weight problems. It estimates body fat based on height and weight. Your health care provider can help determine your BMI and help you achieve or maintain a healthy weight.  For females 20 years of age and older:  A BMI below 18.5 is considered underweight.  A BMI of 18.5 to 24.9 is normal.  A BMI of 25 to 29.9 is considered overweight.  A BMI of 30 and above is considered obese.     Watch levels of cholesterol and blood lipids  You should start having your blood tested for lipids and cholesterol at 20 years of age, then have this test every 5 years.  You may need to have your cholesterol levels checked more often if:  Your lipid or cholesterol levels are  high.  You are older than 50 years of age.  You are at high risk for heart disease.     Cancer screening  Lung Cancer  Lung cancer screening is recommended for adults 55-80 years old who are at high risk for lung cancer because of a history of smoking.  A yearly low-dose CT scan of the lungs is recommended for people who:  Currently smoke.  Have quit within the past 15 years.  Have at least a 30-pack-year history of smoking. A pack year is smoking an average of one pack of cigarettes a day for 1 year.  Yearly screening should continue until it has been 15 years since you quit.  Yearly screening should stop if you develop a health problem that would prevent you from having lung cancer treatment.     Breast Cancer  Practice breast self-awareness. This means understanding how your breasts normally appear and feel.  It also means doing regular breast self-exams. Let your health care provider know about any changes, no matter how small.  If you are in your 20s or 30s, you should have a clinical breast exam (CBE) by a health care provider every 1-3 years as part of a regular health exam.  If you are 40 or older, have a CBE every year. Also consider having a breast X-ray (mammogram) every year.  If you have a family history of breast cancer, talk to your health care provider about genetic screening.  If you are at high risk for breast cancer, talk to your health care provider about having an MRI and a mammogram every year.  Breast cancer gene (BRCA) assessment is recommended for women who have family members with BRCA-related cancers. BRCA-related cancers include:  Breast.  Ovarian.  Tubal.  Peritoneal cancers.  Results of the assessment will determine the need for genetic counseling and BRCA1 and BRCA2 testing.     Cervical Cancer  Your health care provider may recommend that you be screened regularly for cancer of the pelvic organs (ovaries, uterus, and vagina). This screening involves a pelvic examination, including  checking for microscopic changes to the surface of your cervix (Pap test). You may be encouraged to have this screening done every 3 years, beginning at age 21.  For women ages 30-65, health care providers may recommend pelvic exams and Pap testing every 3 years, or they may recommend the Pap and pelvic exam, combined with testing for human papilloma virus (HPV), every 5 years. Some types of HPV increase your risk of cervical cancer. Testing for HPV may also be done on women of any age with unclear Pap test results.  Other health care providers may not recommend any screening for nonpregnant women who are considered low risk for pelvic cancer and who do not have symptoms. Ask your health care provider if a screening pelvic exam is right for you.  If you have had past treatment for cervical cancer or a condition that could lead to cancer, you need Pap tests and screening for cancer for at least 20 years after your treatment. If Pap tests have been discontinued, your risk factors (such as having a new sexual partner) need to be reassessed to determine if screening should resume. Some women have medical problems that increase the chance of getting cervical cancer. In these cases, your health care provider may recommend more frequent screening and Pap tests.     Colorectal Cancer  This type of cancer can be detected and often prevented.  Routine colorectal cancer screening usually begins at 50 years of age and continues through 75 years of age.  Your health care provider may recommend screening at an earlier age if you have risk factors for colon cancer.  Your health care provider may also recommend using home test kits to check for hidden blood in the stool.  A small camera at the end of a tube can be used to examine your colon directly (sigmoidoscopy or colonoscopy). This is done to check for the earliest forms of colorectal cancer.  Routine screening usually begins at age 50.  Direct examination of the colon should  be repeated every 5-10 years through 75 years of age. However, you may need to be screened more often if early forms of precancerous polyps or small growths are found.     Skin Cancer  Check your skin from head to toe regularly.  Tell your health care provider about any new moles or changes in moles, especially if there is a change in a mole's shape or color.  Also tell your health care provider if you have a mole that is larger than the size of a pencil eraser.  Always use sunscreen. Apply sunscreen liberally and repeatedly throughout the day.  Protect yourself by wearing long sleeves, pants, a wide-brimmed hat, and sunglasses whenever you are outside.     Heart disease, diabetes, and high blood pressure  High blood pressure causes heart disease and increases the risk of stroke. High blood pressure is more likely to develop in:  People who have blood pressure in the high end of the normal range (130-139/85-89 mm Hg).  People who are overweight or obese.  People who are .  If you are 18-39 years of age, have your blood pressure checked every 3-5 years. If you are 40 years of age or older, have your blood pressure checked every year. You should have your blood pressure measured twice--once when you are at a hospital or clinic, and once when you are not at a hospital or clinic. Record the average of the two measurements. To check your blood pressure when you are not at a hospital or clinic, you can use:  An automated blood pressure machine at a pharmacy.  A home blood pressure monitor.  If you are between 55 years and 79 years old, ask your health care provider if you should take aspirin to prevent strokes.  Have regular diabetes screenings. This involves taking a blood sample to check your fasting blood sugar level.  If you are at a normal weight and have a low risk for diabetes, have this test once every three years after 45 years of age.  If you are overweight and have a high risk for diabetes,  consider being tested at a younger age or more often.  Preventing infection  Hepatitis B  If you have a higher risk for hepatitis B, you should be screened for this virus. You are considered at high risk for hepatitis B if:  You were born in a country where hepatitis B is common. Ask your health care provider which countries are considered high risk.  Your parents were born in a high-risk country, and you have not been immunized against hepatitis B (hepatitis B vaccine).  You have HIV or AIDS.  You use needles to inject street drugs.  You live with someone who has hepatitis B.  You have had sex with someone who has hepatitis B.  You get hemodialysis treatment.  You take certain medicines for conditions, including cancer, organ transplantation, and autoimmune conditions.     Hepatitis C  Blood testing is recommended for:  Everyone born from 1945 through 1965.  Anyone with known risk factors for hepatitis C.     Sexually transmitted infections (STIs)  You should be screened for sexually transmitted infections (STIs) including gonorrhea and chlamydia if:  You are sexually active and are younger than 24 years of age.  You are older than 24 years of age and your health care provider tells you that you are at risk for this type of infection.  Your sexual activity has changed since you were last screened and you are at an increased risk for chlamydia or gonorrhea. Ask your health care provider if you are at risk.  If you do not have HIV, but are at risk, it may be recommended that you take a prescription medicine daily to prevent HIV infection. This is called pre-exposure prophylaxis (PrEP). You are considered at risk if:  You are sexually active and do not regularly use condoms or know the HIV status of your partner(s).  You take drugs by injection.  You are sexually active with a partner who has HIV.     Talk with your health care provider about whether you are at high risk of being infected with HIV. If you choose to  begin PrEP, you should first be tested for HIV. You should then be tested every 3 months for as long as you are taking PrEP.  Pregnancy  If you are premenopausal and you may become pregnant, ask your health care provider about preconception counseling.  If you may become pregnant, take 400 to 800 micrograms (mcg) of folic acid every day.  If you want to prevent pregnancy, talk to your health care provider about birth control (contraception).  Osteoporosis and menopause  Osteoporosis is a disease in which the bones lose minerals and strength with aging. This can result in serious bone fractures. Your risk for osteoporosis can be identified using a bone density scan.  If you are 65 years of age or older, or if you are at risk for osteoporosis and fractures, ask your health care provider if you should be screened.  Ask your health care provider whether you should take a calcium or vitamin D supplement to lower your risk for osteoporosis.  Menopause may have certain physical symptoms and risks.  Hormone replacement therapy may reduce some of these symptoms and risks.  Talk to your health care provider about whether hormone replacement therapy is right for you.  Follow these instructions at home:  Schedule regular health, dental, and eye exams.  Stay current with your immunizations.  Do not use any tobacco products including cigarettes, chewing tobacco, or electronic cigarettes.  If you are pregnant, do not drink alcohol.  If you are breastfeeding, limit how much and how often you drink alcohol.  Limit alcohol intake to no more than 1 drink per day for nonpregnant women. One drink equals 12 ounces of beer, 5 ounces of wine, or 1½ ounces of hard liquor.  Do not use street drugs.  Do not share needles.  Ask your health care provider for help if you need support or information about quitting drugs.  Tell your health care provider if you often feel depressed.  Tell your health care provider if you have ever been abused or do  not feel safe at home.  This information is not intended to replace advice given to you by your health care provider. Make sure you discuss any questions you have with your health care provider.  Document Released: 07/02/2012 Document Revised: 05/25/2017 Document Reviewed: 09/20/2016  [employer] Interactive Patient Education © 2018 [employer] Healthy Eating  Following a healthy eating pattern may help you to achieve and maintain a healthy body weight, reduce the risk of chronic disease, and live a long and productive life. It is important to follow a healthy eating pattern at an appropriate calorie level for your body. Your nutritional needs should be met primarily through food by choosing a variety of nutrient-rich foods.  What are tips for following this plan?  Reading food labels  Read labels and choose the following:  Reduced or low sodium.  Juices with 100% fruit juice.  Foods with low saturated fats and high polyunsaturated and monounsaturated fats.  Foods with whole grains, such as whole wheat, cracked wheat, brown rice, and wild rice.  Whole grains that are fortified with folic acid. This is recommended for women who are pregnant or who want to become pregnant.  Read labels and avoid the following:  Foods with a lot of added sugars. These include foods that contain brown sugar, corn sweetener, corn syrup, dextrose, fructose, glucose, high-fructose corn syrup, honey, invert sugar, lactose, malt syrup, maltose, molasses, raw sugar, sucrose, trehalose, or turbinado sugar.  Do not eat more than the following amounts of added sugar per day:  6 teaspoons (25 g) for women.  9 teaspoons (38 g) for men.  Foods that contain processed or refined starches and grains.  Refined grain products, such as white flour, degermed cornmeal, white bread, and white rice.  Shopping  Choose nutrient-rich snacks, such as vegetables, whole fruits, and nuts. Avoid high-calorie and high-sugar snacks, such as potato chips, fruit snacks,  "and candy.  Use oil-based dressings and spreads on foods instead of solid fats such as butter, stick margarine, or cream cheese.  Limit pre-made sauces, mixes, and \"instant\" products such as flavored rice, instant noodles, and ready-made pasta.  Try more plant-protein sources, such as tofu, tempeh, black beans, edamame, lentils, nuts, and seeds.  Explore eating plans such as the Mediterranean diet or vegetarian diet.  Cooking  Use oil to sauté or stir-kelley foods instead of solid fats such as butter, stick margarine, or lard.  Try baking, boiling, grilling, or broiling instead of frying.  Remove the fatty part of meats before cooking.  Steam vegetables in water or broth.  Meal planning    At meals, imagine dividing your plate into fourths:  One-half of your plate is fruits and vegetables.  One-fourth of your plate is whole grains.  One-fourth of your plate is protein, especially lean meats, poultry, eggs, tofu, beans, or nuts.  Include low-fat dairy as part of your daily diet.     Lifestyle  Choose healthy options in all settings, including home, work, school, restaurants, or stores.  Prepare your food safely:  Wash your hands after handling raw meats.  Keep food preparation surfaces clean by regularly washing with hot, soapy water.  Keep raw meats separate from ready-to-eat foods, such as fruits and vegetables.  , meat, poultry, and eggs to the recommended internal temperature.  Store foods at safe temperatures. In general:  Keep cold foods at 40°F (4.4°C) or below.  Keep hot foods at 140°F (60°C) or above.  Keep your freezer at 0°F (-17.8°C) or below.  Foods are no longer safe to eat when they have been between the temperatures of 40°-140°F (4.4-60°C) for more than 2 hours.  What foods should I eat?  Fruits  Aim to eat 2 cup-equivalents of fresh, canned (in natural juice), or frozen fruits each day. Examples of 1 cup-equivalent of fruit include 1 small apple, 8 large strawberries, 1 cup canned fruit, ½ " cup dried fruit, or 1 cup 100% juice.  Vegetables  Aim to eat 2½-3 cup-equivalents of fresh and frozen vegetables each day, including different varieties and colors. Examples of 1 cup-equivalent of vegetables include 2 medium carrots, 2 cups raw, leafy greens, 1 cup chopped vegetable (raw or cooked), or 1 medium baked potato.  Grains  Aim to eat 6 ounce-equivalents of whole grains each day. Examples of 1 ounce-equivalent of grains include 1 slice of bread, 1 cup ready-to-eat cereal, 3 cups popcorn, or ½ cup cooked rice, pasta, or cereal.  Meats and other proteins  Aim to eat 5-6 ounce-equivalents of protein each day. Examples of 1 ounce-equivalent of protein include 1 egg, 1/2 cup nuts or seeds, or 1 tablespoon (16 g) peanut butter. A cut of meat or fish that is the size of a deck of cards is about 3-4 ounce-equivalents.  Of the protein you eat each week, try to have at least 8 ounces come from seafood. This includes salmon, trout, herring, and anchovies.  Dairy  Aim to eat 3 cup-equivalents of fat-free or low-fat dairy each day. Examples of 1 cup-equivalent of dairy include 1 cup (240 mL) milk, 8 ounces (250 g) yogurt, 1½ ounces (44 g) natural cheese, or 1 cup (240 mL) fortified soy milk.  Fats and oils  Aim for about 5 teaspoons (21 g) per day. Choose monounsaturated fats, such as canola and olive oils, avocados, peanut butter, and most nuts, or polyunsaturated fats, such as sunflower, corn, and soybean oils, walnuts, pine nuts, sesame seeds, sunflower seeds, and flaxseed.  Beverages  Aim for six 8-oz glasses of water per day. Limit coffee to three to five 8-oz cups per day.  Limit caffeinated beverages that have added calories, such as soda and energy drinks.  Limit alcohol intake to no more than 1 drink a day for nonpregnant women and 2 drinks a day for men. One drink equals 12 oz of beer (355 mL), 5 oz of wine (148 mL), or 1½ oz of hard liquor (44 mL).  Seasoning and other foods  Avoid adding excess amounts of  salt to your foods. Try flavoring foods with herbs and spices instead of salt.  Avoid adding sugar to foods.  Try using oil-based dressings, sauces, and spreads instead of solid fats.  This information is based on general [place] nutrition guidelines. For more information, visit choosemyplate.gov. Exact amounts may vary based on your nutrition needs.  Summary  A healthy eating plan may help you to maintain a healthy weight, reduce the risk of chronic diseases, and stay active throughout your life.  Plan your meals. Make sure you eat the right portions of a variety of nutrient-rich foods.  Try baking, boiling, grilling, or broiling instead of frying.  Choose healthy options in all settings, including home, work, school, restaurants, or stores.  This information is not intended to replace advice given to you by your health care provider. Make sure you discuss any questions you have with your health care provider.  Document Revised: 04/01/2019 Document Reviewed: 04/01/2019  [employer] Patient Education © 2021 [employer]    Exercising to Stay Healthy  To become healthy and stay healthy, it is recommended that you do moderate-intensity and vigorous-intensity exercise. You can tell that you are exercising at a moderate intensity if your heart starts beating faster and you start breathing faster but can still hold a conversation. You can tell that you are exercising at a vigorous intensity if you are breathing much harder and faster and cannot hold a conversation while exercising.  Exercising regularly is important. It has many health benefits, such as:  Improving overall fitness, flexibility, and endurance.  Increasing bone density.  Helping with weight control.  Decreasing body fat.  Increasing muscle strength.  Reducing stress and tension.  Improving overall health.  How often should I exercise?  Choose an activity that you enjoy, and set realistic goals. Your health care provider can help you make an activity plan that  works for you.  Exercise regularly as told by your health care provider. This may include:  Doing strength training two times a week, such as:  Lifting weights.  Using resistance bands.  Push-ups.  Sit-ups.  Yoga.  Doing a certain intensity of exercise for a given amount of time. Choose from these options:  A total of 150 minutes of moderate-intensity exercise every week.  A total of 75 minutes of vigorous-intensity exercise every week.  A mix of moderate-intensity and vigorous-intensity exercise every week.  Children, pregnant women, people who have not exercised regularly, people who are overweight, and older adults may need to talk with a health care provider about what activities are safe to do. If you have a medical condition, be sure to talk with your health care provider before you start a new exercise program.  What are some exercise ideas?    Moderate-intensity exercise ideas include:  Walking 1 mile (1.6 km) in about 15 minutes.  Biking.  Hiking.  Golfing.  Dancing.  Water aerobics.  Vigorous-intensity exercise ideas include:  Walking 4.5 miles (7.2 km) or more in about 1 hour.  Jogging or running 5 miles (8 km) in about 1 hour.  Biking 10 miles (16.1 km) or more in about 1 hour.  Lap swimming.  Roller-skating or in-line skating.  Cross-country skiing.  Vigorous competitive sports, such as football, basketball, and soccer.  Jumping rope.  Aerobic dancing.  What are some everyday activities that can help me to get exercise?  Yard work, such as:  Pushing a .  Raking and bagging leaves.  Washing your car.  Pushing a stroller.  Shoveling snow.  Gardening.  Washing windows or floors.  How can I be more active in my day-to-day activities?  Use stairs instead of an elevator.  Take a walk during your lunch break.  If you drive, park your car farther away from your work or school.  If you take public transportation, get off one stop early and walk the rest of the way.  Stand up or walk around during all  of your indoor phone calls.  Get up, stretch, and walk around every 30 minutes throughout the day.  Enjoy exercise with a friend. Support to continue exercising will help you keep a regular routine of activity.  What guidelines can I follow while exercising?  Before you start a new exercise program, talk with your health care provider.  Do not exercise so much that you hurt yourself, feel dizzy, or get very short of breath.  Wear comfortable clothes and wear shoes with good support.  Drink plenty of water while you exercise to prevent dehydration or heat stroke.  Work out until your breathing and your heartbeat get faster.  Where to find more information  [place] Department of Health and Human Services: www.hhs.gov  Centers for Disease Control and Prevention (CDC): www.cdc.gov  Summary  Exercising regularly is important. It will improve your overall fitness, flexibility, and endurance.  Regular exercise also will improve your overall health. It can help you control your weight, reduce stress, and improve your bone density.  Do not exercise so much that you hurt yourself, feel dizzy, or get very short of breath.  Before you start a new exercise program, talk with your health care provider.  This information is not intended to replace advice given to you by your health care provider. Make sure you discuss any questions you have with your health care provider.  Document Revised: 11/30/2018 Document Reviewed: 11/08/2018  [employer] Patient Education © 2021 [employer]

## 2025-02-27 NOTE — ASSESSMENT & PLAN NOTE
Hypertension is stable and controlled  Continue current treatment regimen.  Blood pressure will be reassessed in 6 months.  Continue lisinopril 20 mg once daily.  Continue Coreg 6.25 mg twice daily.  Counseled on monitoring for dizziness and palpitations in setting of falls.  Check blood pressure at home.

## 2025-02-27 NOTE — ASSESSMENT & PLAN NOTE
-Diabetes is uncontrolled due to hyperglycemia  -Complications include diabetic polyneuropathy  -Recommend improve glycemic control to prevent progression of known complications and new complications arising  -Due to decompensated cirrhosis.,  Contraindicated metformin  -Currently taking Lantus 10 units twice daily, no short acting  -Continue CGM  -Taking ACEi/yes    DM Health Maintenance:  Ophtho: 1/2024, denies retinopathy.  Provided referral for new eye exam  Monofilament / Foot exam: done 4/19/2024   Lipids/Statin: taking a statin with last FLP showing LDL 71 done 2/15/2024

## 2025-02-27 NOTE — ASSESSMENT & PLAN NOTE
Secondary to untreated hepatitis C.  Previously followed with gastroenterology, recommend patient make follow-up appointment.  Will obtain AFP today for hepatocellular cancer screening.

## 2025-03-03 DIAGNOSIS — M70.61 TROCHANTERIC BURSITIS OF BOTH HIPS: ICD-10-CM

## 2025-03-03 DIAGNOSIS — G89.29 CHRONIC LOW BACK PAIN, UNSPECIFIED BACK PAIN LATERALITY, UNSPECIFIED WHETHER SCIATICA PRESENT: ICD-10-CM

## 2025-03-03 DIAGNOSIS — M54.50 CHRONIC LOW BACK PAIN, UNSPECIFIED BACK PAIN LATERALITY, UNSPECIFIED WHETHER SCIATICA PRESENT: ICD-10-CM

## 2025-03-03 DIAGNOSIS — I10 ESSENTIAL HYPERTENSION: Chronic | ICD-10-CM

## 2025-03-03 DIAGNOSIS — M70.62 TROCHANTERIC BURSITIS OF BOTH HIPS: ICD-10-CM

## 2025-03-03 RX ORDER — LIDOCAINE 50 MG/G
1 PATCH TOPICAL EVERY 24 HOURS
Qty: 30 EACH | Refills: 11 | Status: SHIPPED | OUTPATIENT
Start: 2025-03-03

## 2025-03-03 RX ORDER — LISINOPRIL 20 MG/1
20 TABLET ORAL DAILY
Qty: 90 TABLET | Refills: 3 | Status: SHIPPED | OUTPATIENT
Start: 2025-03-03

## 2025-03-03 NOTE — TELEPHONE ENCOUNTER
Caller: Elio Luz Elena Baltazar    Relationship: Self    Best call back number: [phone]     Requested Prescriptions:   Requested Prescriptions     Pending Prescriptions Disp Refills    lisinopril (PRINIVIL,ZESTRIL) 20 MG tablet 90 tablet 3     Sig: Take 1 tablet by mouth Daily. for blood pressure    Diclofenac Sodium (VOLTAREN) 1 % gel gel 100 g 5     Si grams QID prn pain upper extremity joints and 4 grams QID pain lower extremity joints    lidocaine (LIDODERM) 5 % 30 each 11     Sig: Place 1 patch on the skin as directed by provider Daily. Remove & Discard patch within 12 hours or as directed by MD        Pharmacy where request should be sent: [address] [phone] [facility] [phone] FX     Last office visit with prescribing clinician: 2025   Last telemedicine visit with prescribing clinician: Visit date not found   Next office visit with prescribing clinician: 2025         Does the patient have less than a 3 day supply:  [x] Yes  [] No    Would you like a call back once the refill request has been completed: [] Yes [x] No    If the office needs to give you a call back, can they leave a voicemail: [x] Yes [] No    Mansi Tucker Rep   25 09:22 EST

## 2025-03-05 RX ORDER — PEN NEEDLE, DIABETIC 29 G X1/2"
NEEDLE, DISPOSABLE MISCELLANEOUS
Qty: 100 EACH | Refills: 0 | Status: SHIPPED | OUTPATIENT
Start: 2025-03-05

## 2025-03-05 NOTE — TELEPHONE ENCOUNTER
"Rx Refill Note  Requested Prescriptions     Pending Prescriptions Disp Refills    BD Insulin Syringe U/F 30G X 1/2\" 0.3 ML misc [Pharmacy Med Name: BD Insulin Syringe Ultra-Fine 0.3 mL 30 gauge x 1/2\"] 100 each 0     Sig: USE TWICE DAILY TO ADMINISTER INSULIN      Last office visit with prescribing clinician: Visit date not found   Last telemedicine visit with prescribing clinician: Visit date not found   Next office visit with prescribing clinician: Visit date not found                         Would you like a call back once the refill request has been completed: [] Yes [] No    If the office needs to give you a call back, can they leave a voicemail: [] Yes [] No    Lupe Gilmore MA  03/05/25, 16:07 EST  "

## 2025-03-08 DIAGNOSIS — Z86.69 HISTORY OF ITCHING OF EYE: ICD-10-CM

## 2025-03-10 DIAGNOSIS — R11.2 NAUSEA AND VOMITING, UNSPECIFIED VOMITING TYPE: ICD-10-CM

## 2025-03-10 RX ORDER — AZELASTINE HYDROCHLORIDE 0.5 MG/ML
SOLUTION/ DROPS OPHTHALMIC
Qty: 42 ML | Refills: 0 | Status: SHIPPED | OUTPATIENT
Start: 2025-03-10

## 2025-03-10 RX ORDER — ONDANSETRON 4 MG/1
4 TABLET, ORALLY DISINTEGRATING ORAL EVERY 8 HOURS PRN
Qty: 30 TABLET | Refills: 3 | Status: SHIPPED | OUTPATIENT
Start: 2025-03-10

## 2025-03-17 ENCOUNTER — OFFICE VISIT (OUTPATIENT)
Dept: ORTHOPEDIC SURGERY | Facility: CLINIC | Age: 55
End: 2025-03-17
Payer: MEDICAID

## 2025-03-17 VITALS
DIASTOLIC BLOOD PRESSURE: 82 MMHG | HEIGHT: 60 IN | SYSTOLIC BLOOD PRESSURE: 136 MMHG | BODY MASS INDEX: 31.8 KG/M2 | WEIGHT: 162 LBS

## 2025-03-17 DIAGNOSIS — M25.551 RIGHT HIP PAIN: Primary | ICD-10-CM

## 2025-03-17 DIAGNOSIS — R29.6 MULTIPLE FALLS: ICD-10-CM

## 2025-03-17 PROCEDURE — 1160F RVW MEDS BY RX/DR IN RCRD: CPT | Performed by: ORTHOPAEDIC SURGERY

## 2025-03-17 PROCEDURE — 1159F MED LIST DOCD IN RCRD: CPT | Performed by: ORTHOPAEDIC SURGERY

## 2025-03-17 PROCEDURE — 99204 OFFICE O/P NEW MOD 45 MIN: CPT | Performed by: ORTHOPAEDIC SURGERY

## 2025-03-17 PROCEDURE — 3079F DIAST BP 80-89 MM HG: CPT | Performed by: ORTHOPAEDIC SURGERY

## 2025-03-17 PROCEDURE — 3075F SYST BP GE 130 - 139MM HG: CPT | Performed by: ORTHOPAEDIC SURGERY
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
## 2025-04-01 ENCOUNTER — TELEPHONE (OUTPATIENT)
Dept: INTERNAL MEDICINE | Facility: CLINIC | Age: 55
End: 2025-04-01
Payer: MEDICAID

## 2025-04-01 NOTE — TELEPHONE ENCOUNTER
Pa sent Cover my meds [employer]   Key:( [id number])  The request has been approved. The authorization is effective from 04/01/2025 to 04/01/2026, as long as the member is enrolled in their current health plan. A written notification letter will follow with additional details   Pharmacy notified

## 2025-04-02 ENCOUNTER — HOSPITAL ENCOUNTER (OUTPATIENT)
Dept: GENERAL RADIOLOGY | Facility: HOSPITAL | Age: 55
Discharge: HOME | End: 2025-04-02
Admitting: NURSE PRACTITIONER
Payer: MEDICAID

## 2025-04-02 ENCOUNTER — OFFICE VISIT (OUTPATIENT)
Dept: INTERNAL MEDICINE | Facility: CLINIC | Age: 55
End: 2025-04-02
Payer: MEDICAID

## 2025-04-02 VITALS
WEIGHT: 162 LBS | RESPIRATION RATE: 18 BRPM | DIASTOLIC BLOOD PRESSURE: 70 MMHG | TEMPERATURE: 97.5 F | HEART RATE: 82 BPM | HEIGHT: 60 IN | BODY MASS INDEX: 31.8 KG/M2 | SYSTOLIC BLOOD PRESSURE: 110 MMHG

## 2025-04-02 DIAGNOSIS — J30.9 ALLERGIC RHINITIS, UNSPECIFIED SEASONALITY, UNSPECIFIED TRIGGER: ICD-10-CM

## 2025-04-02 DIAGNOSIS — M25.551 RIGHT HIP PAIN: Primary | ICD-10-CM

## 2025-04-02 DIAGNOSIS — M25.551 RIGHT HIP PAIN: ICD-10-CM

## 2025-04-02 DIAGNOSIS — J06.9 ACUTE URI: ICD-10-CM

## 2025-04-02 LAB
EXPIRATION DATE: NORMAL
FLUAV AG UPPER RESP QL IA.RAPID: NOT DETECTED
FLUBV AG UPPER RESP QL IA.RAPID: NOT DETECTED
INTERNAL CONTROL: NORMAL
Lab: NORMAL
SARS-COV-2 AG UPPER RESP QL IA.RAPID: NOT DETECTED

## 2025-04-02 PROCEDURE — 3078F DIAST BP <80 MM HG: CPT | Performed by: NURSE PRACTITIONER

## 2025-04-02 PROCEDURE — 3074F SYST BP LT 130 MM HG: CPT | Performed by: NURSE PRACTITIONER

## 2025-04-02 PROCEDURE — 99214 OFFICE O/P EST MOD 30 MIN: CPT | Performed by: NURSE PRACTITIONER

## 2025-04-02 PROCEDURE — 1125F AMNT PAIN NOTED PAIN PRSNT: CPT | Performed by: NURSE PRACTITIONER

## 2025-04-02 PROCEDURE — 87428 SARSCOV & INF VIR A&B AG IA: CPT | Performed by: NURSE PRACTITIONER

## 2025-04-02 PROCEDURE — 73502 X-RAY EXAM HIP UNI 2-3 VIEWS: CPT

## 2025-04-02 NOTE — PROGRESS NOTES
Chief Complaint  Hip Pain (X1 month .Right hip. )    Subjective          Luz Elena Ballard presents to [facility]  History of Present Illness  History of Present Illness  The patient is a 54-year-old female who presents to the clinic today with hip pain and cold symptoms.    She reports a fall down a few steps at her friend's house, landing on her right side. She did not hit her arm, elbow, shoulder, or head. She did not pass out. The impact was primarily on the lateral aspect of her right femur. Approximately 4 days post-fall, she experienced severe pain upon movement, which persisted for several weeks. Although the intensity of the pain has since decreased, she continues to exhibit a limp. An x-ray was performed, but no abnormalities were detected. She has a known diagnosis of osteoporosis. She consulted Dr. Hernandez, an orthopedic specialist, who advised her to return only if a fracture occurred. She recently underwent a physical examination with Dr. Quijano, during which they discussed her frequent falls and hip pain. She experiences swelling in her hip when bending over, and the pain radiates down into her leg. Occasionally, she experiences a sensation similar to a charley horse. Prolonged sitting results in stiffness upon standing. She is able to bear enough weight to walk without assistance.    She has been experiencing cold symptoms for a couple of days, which have progressively worsened. She reports no known exposure to individuals with influenza or COVID-19. She has not been tested for these viruses. She has been managing her symptoms with Yen.    SOCIAL HISTORY  She does not smoke. She has a history of alcohol use.    ALLERGIES  She is allergic to KEFLEX, SULFA, AUGMENTIN, AMOXICILLIN, and VANCOMYCIN.    MEDICATIONS  Current: [facility]    Objective   Vital Signs:   /70 (BP Location: Right arm, Patient Position: Sitting, Cuff Size: Adult)    "Pulse 82   Temp 97.5 °F (36.4 °C) (Infrared)   Resp 18   Ht 152.4 cm (60\")   Wt 73.5 kg (162 lb)   BMI 31.64 kg/m²     Physical Exam  Vitals and nursing note reviewed.   Constitutional:       General: She is not in acute distress.     Appearance: Normal appearance. She is well-developed. She is not ill-appearing.   HENT:      Head: Normocephalic and atraumatic.      Right Ear: External ear normal.      Left Ear: External ear normal.      Nose: Congestion present.      Mouth/Throat:      Pharynx: Posterior oropharyngeal erythema present.   Eyes:      General: No scleral icterus.        Right eye: No discharge.         Left eye: No discharge.      Conjunctiva/sclera: Conjunctivae normal.   Neck:      Thyroid: No thyromegaly.   Cardiovascular:      Rate and Rhythm: Normal rate and regular rhythm.   Pulmonary:      Effort: Pulmonary effort is normal.      Breath sounds: Normal breath sounds.   Abdominal:      General: Bowel sounds are normal. There is no distension.      Palpations: Abdomen is soft.      Tenderness: There is no abdominal tenderness.   Musculoskeletal:      Comments: Patient has tenderness over the trochanter with palpation into the anterior thigh.  There is no erythema edema bruising swelling.  She has free range of motion DTRs are 2+ and she has negative pain with internal/external rotation of the right lower extremity.  She does exhibit tenderness and difficulty walking with change in gait regarding her report of right lower extremity pain.   Lymphadenopathy:      Cervical: No cervical adenopathy.   Skin:     General: Skin is warm and dry.      Capillary Refill: Capillary refill takes 2 to 3 seconds.      Coloration: Skin is not pale.   Neurological:      Mental Status: She is alert and oriented to person, place, and time.   Psychiatric:         Mood and Affect: Mood normal.         Behavior: Behavior normal.        Result Review :                 Assessment and Plan    Diagnoses and all orders " for this visit:    1. Right hip pain (Primary)  -     XR Hip With or Without Pelvis 2 - 3 View Right; Future  -     Ambulatory Referral to Physical Therapy for Evaluation & Treatment      Assessment & Plan  1. Right hip pain.  The patient experienced a fall down steps at a friend's house, landing on her right side. Initially, there was no significant pain, but four days later, she began experiencing severe pain in the right hip, which has persisted for weeks. An initial x-ray did not reveal any fractures. Given her history of osteoporosis and multiple falls, a repeat x-ray is warranted to ensure no fractures were missed. Physical therapy is recommended to help with strengthening and pain management. A repeat x-ray of the right hip will be ordered. If the x-ray is normal, physical therapy will be initiated. If symptoms persist despite a negative x-ray and physical therapy, further evaluation by orthopedics and a possible MRI of the hip will be considered.    2. Cold symptoms.  The patient reports experiencing cold symptoms for a couple of days, including congestion and a runny nose. She has not been around anyone with flu or COVID-19. A swab test for COVID-19 and influenza will be conducted. She is advised to take Coricidin HBP for nasal symptoms and Mucinex DM for cough. She should maintain adequate hydration and rest. If the influenza test is positive, Tamiflu will be prescribed. If symptoms do not improve within 2 to 3 days, she should inform us.    Addendum COVID and flu test are negative.  If symptoms persist she will let us know.    X-ray reviewed without abnormal findings such as fracture per my read.  Will await formal read and call the patient when available.      Follow Up   No follow-ups on file.  Patient was given instructions and counseling regarding her condition or for health maintenance advice. Please see specific information pulled into the AVS if appropriate.     RTC/call  If symptoms worsen  Meds  CHELI and 's reviewed and pt v/u    04/02/25   14:39 EDT    Patient or patient representative verbalized consent to the visit recording.  I have personally performed the services described in this document as transcribed by the above individual, and it is both accurate and complete.

## 2025-04-02 NOTE — TELEPHONE ENCOUNTER
Name: Elio Luz Elena Baltazar      Relationship: Self      Best Callback Number: [phone]       HUB PROVIDED THE RELAY MESSAGE FROM THE OFFICE      PATIENT: VOICED UNDERSTANDING AND HAS NO FURTHER QUESTIONS AT THIS TIME    ADDITIONAL INFORMATION:

## 2025-04-08 ENCOUNTER — RESULTS FOLLOW-UP (OUTPATIENT)
Dept: INTERNAL MEDICINE | Facility: CLINIC | Age: 55
End: 2025-04-08
Payer: MEDICAID

## 2025-04-09 RX ORDER — CARVEDILOL 6.25 MG/1
6.25 TABLET ORAL 2 TIMES DAILY WITH MEALS
Qty: 60 TABLET | Refills: 11 | Status: SHIPPED | OUTPATIENT
Start: 2025-04-09

## 2025-04-16 ENCOUNTER — OFFICE VISIT (OUTPATIENT)
Dept: BEHAVIORAL HEALTH | Facility: CLINIC | Age: 55
End: 2025-04-16
Payer: MEDICAID

## 2025-04-16 VITALS
DIASTOLIC BLOOD PRESSURE: 74 MMHG | WEIGHT: 162 LBS | BODY MASS INDEX: 31.8 KG/M2 | HEIGHT: 60 IN | OXYGEN SATURATION: 97 % | HEART RATE: 59 BPM | SYSTOLIC BLOOD PRESSURE: 138 MMHG

## 2025-04-16 DIAGNOSIS — F33.1 MODERATE EPISODE OF RECURRENT MAJOR DEPRESSIVE DISORDER: ICD-10-CM

## 2025-04-16 DIAGNOSIS — G47.00 INSOMNIA, UNSPECIFIED TYPE: ICD-10-CM

## 2025-04-16 DIAGNOSIS — F41.1 GAD (GENERALIZED ANXIETY DISORDER): Primary | Chronic | ICD-10-CM

## 2025-04-16 RX ORDER — QUETIAPINE FUMARATE 25 MG/1
25 TABLET, FILM COATED ORAL NIGHTLY
Qty: 90 TABLET | Refills: 0 | Status: SHIPPED | OUTPATIENT
Start: 2025-04-16

## 2025-04-16 RX ORDER — BUPROPION HYDROCHLORIDE 300 MG/1
300 TABLET ORAL EVERY MORNING
Qty: 90 TABLET | Refills: 0 | Status: SHIPPED | OUTPATIENT
Start: 2025-04-16

## 2025-04-16 RX ORDER — MIRTAZAPINE 7.5 MG/1
7.5 TABLET, FILM COATED ORAL NIGHTLY
Qty: 90 TABLET | Refills: 0 | Status: SHIPPED | OUTPATIENT
Start: 2025-04-16

## 2025-04-16 NOTE — PROGRESS NOTES
Follow Up Office Visit      Patient Name: Luz Elena Ballard  : 1970   MRN: [id number]     Referring Provider: Sal Astorga MD    Chief Complaint:      ICD-10-CM ICD-9-CM   1. EFREM (generalized anxiety disorder)  F41.1 300.02   2. Insomnia, unspecified type  G47.00 780.52   3. Moderate episode of recurrent major depressive disorder  F33.1 296.32        History of Present Illness:   Luz Elena Ballard is a 54 y.o. female who is here today for follow up and medication management    Subjective      Patient Reports:   History of Present Illness  The patient presents for evaluation of major depressive disorder, anxiety, and insomnia.  She reports a satisfactory response to her current medication regimen, which includes Wellbutrin 300 mg and Vraylar 1.5 mg. She continues to take Remeron 7.5 mg and Seroquel 25 mg at night, which have been effective in managing her sleep, allowing her to achieve approximately 8 hours of rest per night. She reports feeling well-rested upon awakening. Her appetite remains robust, with no significant weight fluctuations reported. Denies any side effects. Denies SI/HI/AVH.    She is complaining of right leg/hip pain which she attributes to a recent fall. Despite consultations with her primary care physician, his nurse practitioner, and a surgeon, the pain remains unrelieved. No definitive diagnosis has been made. The pain is described as constant and severe, persisting throughout the day.    Review of Systems:   Review of Systems   Constitutional:  Negative for appetite change and unexpected weight change.   Eyes:  Negative for visual disturbance.   Respiratory:  Negative for chest tightness.    Cardiovascular:  Negative for palpitations.   Musculoskeletal:  Positive for gait problem.        Right leg/hip pain   Skin:  Negative for rash and wound.   Neurological:  Negative for tremors, seizures, weakness and light-headedness.   Psychiatric/Behavioral:  Negative for agitation,  behavioral problems, hallucinations, self-injury and suicidal ideas. The patient is not hyperactive.      Sleep pattern: 8 hours  Appetite: overeating     PHQ-9 Depression Screening  Little interest or pleasure in doing things? Over half   Feeling down, depressed, or hopeless? Over half   PHQ-2 Total Score 4   Trouble falling or staying asleep, or sleeping too much? Over half   Feeling tired or having little energy? Almost all   Poor appetite or overeating? Almost all   Feeling bad about yourself - or that you are a failure or have let yourself or your family down? Almost all   Trouble concentrating on things, such as reading the newspaper or watching television? Almost all   Moving or speaking so slowly that other people could have noticed? Or the opposite - being so fidgety or restless that you have been moving around a lot more than usual? Almost all   Thoughts that you would be better off dead, or of hurting yourself in some way? Not at all   PHQ-9 Total Score 21   If you checked off any problems, how difficult have these problems made it for you to do your work, take care of things at home, or get along with other people? Very difficult       EFREM-7 Anxiety Screening  Over the last two weeks, how often have you been bothered by the following problems?  Feeling nervous, anxious or on edge: More than half the days  Not being able to stop or control worrying: Nearly every day  Worrying too much about different things: Nearly every day  Trouble Relaxing: Nearly every day  Being so restless that it is hard to sit still: Nearly every day  Becoming easily annoyed or irritable: Nearly every day  Feeling afraid as if something awful might happen: Nearly every day  EFREM 7 Total Score: 20  If you checked any problems, how difficult have these problems made it for you to do your work, take care of things at home, or get along with other people: Very difficult    RISK ASSESSMENT:  Patient denies any thoughts or intent of  "suicide today. Patient denies any impulsive behavior today.     Medications:     Current Outpatient Medications:     ammonium lactate (AMLACTIN) 12 % cream, APPLY TOPICALLY TO THE AFFECTED AREA OF LEFT AND RIGHT FEET, HEELS, LEGS, Disp: , Rfl:     azelastine (OPTIVAR) 0.05 % ophthalmic solution, ADMINISTER 1 DROP IN BOTH EYES AS NEEDED, Disp: 42 mL, Rfl: 0    BD Insulin Syringe U/F 30G X 1/2\" 0.3 ML misc, USE TWICE DAILY TO ADMINISTER INSULIN, Disp: 100 each, Rfl: 0    Budeson-Glycopyrrol-Formoterol (BREZTRI) 160-9-4.8 MCG/ACT aerosol inhaler, Inhale 2 puffs 2 (Two) Times a Day., Disp: 10.7 g, Rfl: 11    buprenorphine-naloxone (SUBOXONE) 8-2 MG film film, TAKE 2 FILMS UNDER THE TONGUE EVERY DAY, Disp: , Rfl:     buPROPion XL (Wellbutrin XL) 300 MG 24 hr tablet, Take 1 tablet by mouth Every Morning., Disp: 90 tablet, Rfl: 0    Cariprazine HCl (Vraylar) 1.5 MG capsule capsule, Take 1 capsule by mouth Daily., Disp: 90 capsule, Rfl: 0    carvedilol (Coreg) 6.25 MG tablet, Take 1 tablet by mouth 2 (Two) Times a Day With Meals., Disp: 60 tablet, Rfl: 11    Continuous Glucose Sensor (FreeStyle Maria C 3 Plus Sensor), Use Every 14 (Fourteen) Days., Disp: 6 each, Rfl: 3    Diclofenac Sodium (VOLTAREN) 1 % gel gel, 2 grams QID prn pain upper extremity joints and 4 grams QID pain lower extremity joints, Disp: 100 g, Rfl: 5    Insulin Glargine (LANTUS SOLOSTAR) 100 UNIT/ML injection pen, Inject 10 units twice daily, titrate for max daily dose of 100 units, Disp: , Rfl:     Insulin Pen Needle 32G X 4 MM misc, Use up to 5 times daily with insulin, Disp: 500 each, Rfl: 0    lidocaine (LIDODERM) 5 %, Place 1 patch on the skin as directed by provider Daily. Remove & Discard patch within 12 hours or as directed by MD, Disp: 30 each, Rfl: 11    lisinopril (PRINIVIL,ZESTRIL) 20 MG tablet, Take 1 tablet by mouth Daily. for blood pressure, Disp: 90 tablet, Rfl: 3    mirtazapine (REMERON) 7.5 MG tablet, Take 1 tablet by mouth Every Night., " "Disp: 90 tablet, Rfl: 0    mupirocin (BACTROBAN) 2 % ointment, Apply 1 Application topically to the appropriate area as directed 3 (Three) Times a Day for 7 days., Disp: 30 g, Rfl: 0    ondansetron ODT (ZOFRAN-ODT) 4 MG disintegrating tablet, Place 1 tablet on the tongue Every 8 (Eight) Hours As Needed for Vomiting or Nausea., Disp: 30 tablet, Rfl: 3    pantoprazole (Protonix) 40 MG EC tablet, Take 1 tablet by mouth Daily., Disp: 30 tablet, Rfl: 11    QUEtiapine (SEROquel) 25 MG tablet, Take 1 tablet by mouth Every Night., Disp: 90 tablet, Rfl: 0    rosuvastatin (Crestor) 10 MG tablet, Take 1 tablet by mouth Daily., Disp: 90 tablet, Rfl: 3    Medication Considerations:  BENY reviewed and appropriate.      Allergies:   Allergies   Allergen Reactions    Keflex [Cephalexin] GI Intolerance    Sulfamethoxazole-Trimethoprim Rash    Amoxicillin Rash    Augmentin [Amoxicillin-Pot Clavulanate] Nausea Only and Rash    Sulfa Antibiotics Rash     Sulfa drugs    Vancomycin Rash         Objective     Physical Exam:  Vital Signs:   Vitals:    04/16/25 1622 04/16/25 1623 04/16/25 1624   BP:   138/74   Pulse:   59   SpO2:   97%   Weight:  73.5 kg (162 lb)    Height: 152.4 cm (60\")       Body mass index is 31.64 kg/m².     Mental Status Exam:   Hygiene:   good   Cooperation:  Cooperative  Eye Contact:  Good  Psychomotor Behavior:  Appropriate  Affect:  Appropriate  Mood: normal  Speech:  Normal  Thought Process:  Goal directed and Linear  Thought Content:  Normal  Suicidal:  None  Homicidal:  None  Hallucinations:  None  Delusion:  None  Memory:  Intact  Orientation:  Person, Place, Time, and Situation  Reliability:  good  Insight:  Good  Judgement:  Good  Impulse Control:  Good  Physical/Medical Issues:   see problem list      Assessment / Plan      Visit Diagnosis/Orders Placed This Visit:  Diagnoses and all orders for this visit:    1. EFREM (generalized anxiety disorder) (Primary)  -     mirtazapine (REMERON) 7.5 MG tablet; Take " 1 tablet by mouth Every Night.  Dispense: 90 tablet; Refill: 0  -     buPROPion XL (Wellbutrin XL) 300 MG 24 hr tablet; Take 1 tablet by mouth Every Morning.  Dispense: 90 tablet; Refill: 0    2. Insomnia, unspecified type  -     mirtazapine (REMERON) 7.5 MG tablet; Take 1 tablet by mouth Every Night.  Dispense: 90 tablet; Refill: 0  -     QUEtiapine (SEROquel) 25 MG tablet; Take 1 tablet by mouth Every Night.  Dispense: 90 tablet; Refill: 0    3. Moderate episode of recurrent major depressive disorder  -     Cariprazine HCl (Vraylar) 1.5 MG capsule capsule; Take 1 capsule by mouth Daily.  Dispense: 90 capsule; Refill: 0  -     buPROPion XL (Wellbutrin XL) 300 MG 24 hr tablet; Take 1 tablet by mouth Every Morning.  Dispense: 90 tablet; Refill: 0             Functional Status: No impairment    Prognosis: Good with Ongoing Treatment     Impression/Formulation:  Patient appeared alert and oriented.  Patient is voluntarily requesting to continue outpatient psychiatric treatment at [facility].  Patient is receptive to assistance with maintaining a stable lifestyle.  Patient presents with history of     ICD-10-CM ICD-9-CM   1. EFREM (generalized anxiety disorder)  F41.1 300.02   2. Insomnia, unspecified type  G47.00 780.52   3. Moderate episode of recurrent major depressive disorder  F33.1 296.32   .    Reviewed patient's previous provider notes. Reviewed most recent labs. Patient meets DSM V diagnostic criteria for diagnoses. Diagnoses may be updated as more information becomes available.     Assessment & Plan  Assessment and Plan Section      The patient's major depressive disorder appears to be improving with the current treatment regimen. She reports no suicidal ideation, homicidal thoughts, or hallucinations. Her sleep quality is adequate, with approximately eight hours of rest per night. Appetite is increased, but there are no significant weight changes. Anxiety is managed with the current  medications. The muscle cramps are a significant source of distress, and further diagnostic evaluation is warranted.    Plan:  - Continue current medications: Wellbutrin 300 mg, Vraylar 1.5 mg, Remeron 7.5 mg, and Seroquel 25 mg.    Follow-up:  The patient is scheduled for a follow-up visit in 3 months or sooner if needed.     Patient will continue supportive psychotherapy efforts and medications as indicated. Clinic will obtain release of information for current treatment team for continuity of care as needed. Patient will contact this office, call 911 or present to the nearest emergency room should suicidal or homicidal ideations occur.  Discussed medication options and treatment plan of prescribed medication(s) as well as the risks, benefits, and potential side effects. Patient acknowledged and verbally consented to continue with current treatment plan and was educated on the importance of compliance with treatment and follow-up appointments.     Patient instructions:  All risks/benefits and side effects discussed with patient, including risk for weight gain, increased lipids, hyperprolactemia, EPS symptoms, including restlessness, muscle  stiffness or contraction of head, face, neck, trunk and limbs, and TD (which can be irreversible). Pt verbalizes understanding and consents to treatment with these medications.     Follow Up:   Return in about 3 months (around 7/16/2025) for Med Check.    Patient or patient representative verbalized consent for the use of Ambient Listening during the visit with  GAGE Garza for chart documentation. 4/16/2025  16:45 EDT    GAGE Garza, [facility]

## 2025-04-17 ENCOUNTER — TELEPHONE (OUTPATIENT)
Dept: INTERNAL MEDICINE | Facility: CLINIC | Age: 55
End: 2025-04-17
Payer: MEDICAID

## 2025-04-17 NOTE — TELEPHONE ENCOUNTER
Insurance typically requires repeat evaluation and trial of PT. Recommend follow up to discuss imaging.    Dr. Astorga

## 2025-04-17 NOTE — TELEPHONE ENCOUNTER
Caller: Luz Elena Ballard     Relationship: SELF    Best call back number:  [phone]    What is your medical concern? RIGHT HIP PAIN , LIMPING    How long has this issue been going on?  A MONTH    Is your provider already aware of this issue? YES    SHE IS ASKING IF A MRI CAN BE ORDERED; SHE HAS SEEN [facility] TO ADDRESS THE ISSUE    PLEASE CALL TO ADVISE    PATIENT ALSO HAS SENT MY CHART MESSAGES

## 2025-04-17 NOTE — TELEPHONE ENCOUNTER
Tried calling patient left voicemail to return call.    RELAY:   Insurance typically requires repeat evaluation and trial of PT. Recommend follow up to discuss imaging.     Dr. Astorga

## 2025-04-25 DIAGNOSIS — J44.9 CHRONIC OBSTRUCTIVE PULMONARY DISEASE, UNSPECIFIED COPD TYPE: ICD-10-CM

## 2025-04-25 RX ORDER — BUDESONIDE, GLYCOPYRROLATE, AND FORMOTEROL FUMARATE 160; 9; 4.8 UG/1; UG/1; UG/1
2 AEROSOL, METERED RESPIRATORY (INHALATION) 2 TIMES DAILY
Qty: 85.6 G | Refills: 0 | Status: SHIPPED | OUTPATIENT
Start: 2025-04-25

## 2025-04-30 RX ORDER — PEN NEEDLE, DIABETIC 29 G X1/2"
NEEDLE, DISPOSABLE MISCELLANEOUS
Qty: 100 EACH | Refills: 0 | Status: SHIPPED | OUTPATIENT
Start: 2025-04-30

## 2025-05-06 ENCOUNTER — OFFICE VISIT (OUTPATIENT)
Dept: INTERNAL MEDICINE | Facility: CLINIC | Age: 55
End: 2025-05-06
Payer: MEDICAID

## 2025-05-06 ENCOUNTER — HOSPITAL ENCOUNTER (OUTPATIENT)
Dept: GENERAL RADIOLOGY | Facility: HOSPITAL | Age: 55
Discharge: HOME | End: 2025-05-06
Admitting: STUDENT IN AN ORGANIZED HEALTH CARE EDUCATION/TRAINING PROGRAM
Payer: MEDICAID

## 2025-05-06 VITALS
HEART RATE: 65 BPM | RESPIRATION RATE: 20 BRPM | DIASTOLIC BLOOD PRESSURE: 80 MMHG | WEIGHT: 165.25 LBS | BODY MASS INDEX: 32.27 KG/M2 | SYSTOLIC BLOOD PRESSURE: 124 MMHG | TEMPERATURE: 98 F | OXYGEN SATURATION: 98 %

## 2025-05-06 DIAGNOSIS — M25.551 RIGHT HIP PAIN: Primary | ICD-10-CM

## 2025-05-06 DIAGNOSIS — M25.561 CHRONIC PAIN OF RIGHT KNEE: ICD-10-CM

## 2025-05-06 DIAGNOSIS — K74.60 CIRRHOSIS OF LIVER WITHOUT ASCITES, UNSPECIFIED HEPATIC CIRRHOSIS TYPE: ICD-10-CM

## 2025-05-06 DIAGNOSIS — G89.29 CHRONIC PAIN OF RIGHT KNEE: ICD-10-CM

## 2025-05-06 PROCEDURE — 3079F DIAST BP 80-89 MM HG: CPT | Performed by: STUDENT IN AN ORGANIZED HEALTH CARE EDUCATION/TRAINING PROGRAM

## 2025-05-06 PROCEDURE — 1125F AMNT PAIN NOTED PAIN PRSNT: CPT | Performed by: STUDENT IN AN ORGANIZED HEALTH CARE EDUCATION/TRAINING PROGRAM

## 2025-05-06 PROCEDURE — 1160F RVW MEDS BY RX/DR IN RCRD: CPT | Performed by: STUDENT IN AN ORGANIZED HEALTH CARE EDUCATION/TRAINING PROGRAM

## 2025-05-06 PROCEDURE — 3074F SYST BP LT 130 MM HG: CPT | Performed by: STUDENT IN AN ORGANIZED HEALTH CARE EDUCATION/TRAINING PROGRAM

## 2025-05-06 PROCEDURE — 99214 OFFICE O/P EST MOD 30 MIN: CPT | Performed by: STUDENT IN AN ORGANIZED HEALTH CARE EDUCATION/TRAINING PROGRAM

## 2025-05-06 PROCEDURE — 1159F MED LIST DOCD IN RCRD: CPT | Performed by: STUDENT IN AN ORGANIZED HEALTH CARE EDUCATION/TRAINING PROGRAM

## 2025-05-06 PROCEDURE — 73560 X-RAY EXAM OF KNEE 1 OR 2: CPT

## 2025-05-06 NOTE — PATIENT INSTRUCTIONS
[facility] and they should be calling within a few days to schedule.  You may also call them at [phone]     Tylenol ok to take up to 500mg four times daily as needed.

## 2025-05-06 NOTE — ASSESSMENT & PLAN NOTE
Chronic, complicates care. Recommend avoiding NSAID's due to history of varices. Ok to take up to 2g daily of APAP prn.

## 2025-05-06 NOTE — PROGRESS NOTES
Follow Up Office Visit      Date: 2025   Patient Name: Luz Elena Ballard  : 1970   MRN: [id number]     Chief Complaint:    Chief Complaint   Patient presents with    Hip Pain     Right hip        History of Present Illness: Luz Elena Ballard is a 55 y.o. female  with history of hep C related cirrhosis (Child Shay Class A), esophageal varicies, HTN, HLD, T2DM tobacco dependence (prior cigarette, current pipe), prior polysubstance abuse on buprenorphine, anxiety and depression  who is here today for chronic right hip pain.       My right hip all the way on front of leg down  Symptoms are: recurrent.   Onset was 1 to 6 months.   Symptoms occur: constantly.  Symptoms include: joint pain, joint swelling, myalgias, nausea, numbness and weakness.   Pertinent negative symptoms include no abdominal pain, no anorexia, no change in stool, no chest pain, no chills, no congestion, no cough, no diaphoresis, no fatigue, no fever, no headaches, no neck pain, no rash, no sore throat, no swollen glands, no dysuria, no vertigo, no visual change and no vomiting.   Treatment and/or Medications comments include: Seen Dr Astorga jesse xrays also saw his nurse practitioner and surgeon   Additional information: This is a constant pain goes from my hip down front including knee tingles all the way to my feet.    History of Present Illness  The patient presents for evaluation of right hip pain.    She reports persistent pain in her right hip, which radiates down to her knee and extends along the shin bone. The pain is described as excruciating, significantly impacting her ability to care for her granddaughter. She has not been able to attend physical therapy due to insurance issues (sent to results who did not accept, never established with [facility]). Swelling in the knee is noted, which she describes as feeling fluid-filled, although this symptom is not consistent. The knee is occassionally warm to touch, and the pain  "intensifies at night. Prolonged sitting results in stiffness upon standing. The pain is constant and is not alleviated by changing positions, whether standing, sitting, or lying down. She has experienced falls due to the pain, with the most recent incident occurring a week ago when she attempted to sit down. She prefers to wait for a definitive diagnosis before considering pain management options such as injections. She inquires about the possibility of taking acetaminophen at half the recommended dose. Currently using lidocaine patches and topical voltaren. She is on buprenorphine.     - I personally reviewed note by Dr. Oswald Farley of orthopedic surgery dated 3/17/25. AT that time he found no unusual bony features with regards to her right hip other than some lateral hip tenderness. He recommended patient go to er due to falls described as \"blackouts\"    - Returned to clinic on 4/2/25 and Saw Aide ALMONTE. Repeat Xray obtained and recommended PT. Xray grossly normal without fracture  If symptoms persist despite negative xray and pt, recommend MRI    Subjective      Review of Systems:   Review of Systems   Constitutional:  Negative for chills, diaphoresis, fatigue and fever.   HENT:  Negative for congestion, sore throat and swollen glands.    Respiratory:  Negative for cough.    Cardiovascular:  Negative for chest pain.   Gastrointestinal:  Positive for nausea. Negative for abdominal pain, anorexia and vomiting.   Genitourinary:  Negative for dysuria.   Musculoskeletal:  Positive for joint pain and myalgias. Negative for neck pain.   Skin:  Negative for rash.   Neurological:  Positive for weakness and numbness. Negative for vertigo.       I have reviewed the patients family history, social history, past medical history, past surgical history and have updated it as appropriate.     Medications:     Current Outpatient Medications:     ammonium lactate (AMLACTIN) 12 % cream, APPLY TOPICALLY TO THE AFFECTED AREA " "OF LEFT AND RIGHT FEET, HEELS, LEGS, Disp: , Rfl:     azelastine (OPTIVAR) 0.05 % ophthalmic solution, ADMINISTER 1 DROP IN BOTH EYES AS NEEDED, Disp: 42 mL, Rfl: 0    BD Insulin Syringe U/F 30G X 1/2\" 0.3 ML misc, USE TWICE DAILY TO ADMINISTER INSULIN, Disp: 100 each, Rfl: 0    Breztri Aerosphere 160-9-4.8 MCG/ACT aerosol inhaler, INHALE 2 PUFFS BY MOUTH TWICE DAILY, Disp: 85.6 g, Rfl: 0    buprenorphine-naloxone (SUBOXONE) 8-2 MG film film, TAKE 2 FILMS UNDER THE TONGUE EVERY DAY, Disp: , Rfl:     buPROPion XL (Wellbutrin XL) 300 MG 24 hr tablet, Take 1 tablet by mouth Every Morning., Disp: 90 tablet, Rfl: 0    Cariprazine HCl (Vraylar) 1.5 MG capsule capsule, Take 1 capsule by mouth Daily., Disp: 90 capsule, Rfl: 0    carvedilol (Coreg) 6.25 MG tablet, Take 1 tablet by mouth 2 (Two) Times a Day With Meals., Disp: 60 tablet, Rfl: 11    Continuous Glucose Sensor (FreeStyle Maria C 3 Plus Sensor), Use Every 14 (Fourteen) Days., Disp: 6 each, Rfl: 3    Diclofenac Sodium (VOLTAREN) 1 % gel gel, 2 grams QID prn pain upper extremity joints and 4 grams QID pain lower extremity joints, Disp: 100 g, Rfl: 5    Insulin Glargine (LANTUS SOLOSTAR) 100 UNIT/ML injection pen, Inject 10 units twice daily, titrate for max daily dose of 100 units, Disp: , Rfl:     Insulin Pen Needle 32G X 4 MM misc, Use up to 5 times daily with insulin, Disp: 500 each, Rfl: 0    lidocaine (LIDODERM) 5 %, Place 1 patch on the skin as directed by provider Daily. Remove & Discard patch within 12 hours or as directed by MD, Disp: 30 each, Rfl: 11    lisinopril (PRINIVIL,ZESTRIL) 20 MG tablet, Take 1 tablet by mouth Daily. for blood pressure, Disp: 90 tablet, Rfl: 3    mirtazapine (REMERON) 7.5 MG tablet, Take 1 tablet by mouth Every Night., Disp: 90 tablet, Rfl: 0    naloxone (NARCAN) 4 MG/0.1ML nasal spray, Administer 1 spray into the nostril. CALL 911! Wait 2-3 minutes, then administer 1 spray into the other nostril. Repeat until signs of overdose " cease or until medical help arrives, Disp: , Rfl:     ondansetron ODT (ZOFRAN-ODT) 4 MG disintegrating tablet, Place 1 tablet on the tongue Every 8 (Eight) Hours As Needed for Vomiting or Nausea., Disp: 30 tablet, Rfl: 3    pantoprazole (Protonix) 40 MG EC tablet, Take 1 tablet by mouth Daily., Disp: 30 tablet, Rfl: 11    QUEtiapine (SEROquel) 25 MG tablet, Take 1 tablet by mouth Every Night., Disp: 90 tablet, Rfl: 0    rosuvastatin (Crestor) 10 MG tablet, Take 1 tablet by mouth Daily., Disp: 90 tablet, Rfl: 3    Allergies:   Allergies   Allergen Reactions    Keflex [Cephalexin] GI Intolerance    Sulfamethoxazole-Trimethoprim Rash    Amoxicillin Rash    Augmentin [Amoxicillin-Pot Clavulanate] Nausea Only and Rash    Sulfa Antibiotics Rash     Sulfa drugs    Vancomycin Rash       Objective     Physical Exam: Please see above  Vital Signs:   Vitals:    05/06/25 1134   BP: 124/80   Pulse: 65   Resp: 20   Temp: 98 °F (36.7 °C)   TempSrc: Temporal   SpO2: 98%   Weight: 75 kg (165 lb 4 oz)   PainSc: 8    PainLoc: Knee     Body mass index is 32.27 kg/m².    Physical Exam  Vitals reviewed.   Constitutional:       General: She is not in acute distress.     Appearance: Normal appearance. She is obese. She is not ill-appearing or toxic-appearing.   HENT:      Head: Normocephalic and atraumatic.      Right Ear: External ear normal.      Left Ear: External ear normal.      Nose: Nose normal. No congestion.      Mouth/Throat:      Mouth: Mucous membranes are moist.   Eyes:      General: No scleral icterus.     Extraocular Movements: Extraocular movements intact.   Cardiovascular:      Rate and Rhythm: Normal rate and regular rhythm.      Pulses: Normal pulses.      Heart sounds: Murmur (II/VI systolic flow murmur across precodium, best appreicated at RUSB, stable) heard.      No friction rub. No gallop.   Pulmonary:      Effort: Pulmonary effort is normal. No respiratory distress.      Breath sounds: Normal breath sounds. No  stridor. No wheezing, rhonchi or rales.   Musculoskeletal:         General: Tenderness (over right IT band and lateral right knee) present. No swelling. Normal range of motion.      Cervical back: Normal range of motion. No rigidity.      Comments: + windsheild wiper test on right. Antalgic gait   Skin:     General: Skin is warm and dry.      Capillary Refill: Capillary refill takes less than 2 seconds.   Neurological:      General: No focal deficit present.      Mental Status: She is alert and oriented to person, place, and time.   Psychiatric:         Mood and Affect: Mood normal.         Behavior: Behavior normal.         Judgment: Judgment normal.       Physical Exam        Procedures    Results:   Labs:   Hemoglobin A1C   Date Value Ref Range Status   11/04/2024 11.4 (A) 4.5 - 5.7 % Final   02/15/2024 9.50 (H) 4.80 - 5.60 % Final     TSH   Date Value Ref Range Status   11/21/2024 0.729 0.270 - 4.200 uIU/mL Final      Results        Imaging:   No valid procedures specified.     Assessment / Plan      Assessment/Plan:   Problem List Items Addressed This Visit       Cirrhosis of liver without ascites    Overview   - Child-Shay score 2/16/24: 6 points, Class A, MELD-Na 21 points, 7-10% estimated 90day mortality         Current Assessment & Plan   Chronic, complicates care. Recommend avoiding NSAID's due to history of varices. Ok to take up to 2g daily of APAP prn.           Other Visit Diagnoses         Right hip pain    -  Primary    Relevant Orders    MRI Hip Right Without Contrast      Chronic pain of right knee        Relevant Orders    XR Knee 1 or 2 View Right            Assessment & Plan  1. Right hip pain.  Chronic and worsening  - Persistent right hip pain radiating down to the knee and shin, described as excruciating.  - Previous x-rays showed no concerning findings.  - MRI of the right hip will be ordered to further investigate the cause of the pain.  - Advised to avoid NSAIDs due to liver concerns and  can take acetaminophen up to 2 g per day, divided into four doses of 500 mg each. Physical therapy at [facility] is recommended as they accept her insurance.    2. Right knee pain.  - Pain from the right hip radiates to the right knee, which is swollen and warm to the touch.  - Physical examination reveals tenderness along the IT band and difficulty bearing weight.  - An x-ray of the right knee will be conducted today.  - Continue using diclofenac topical gel and pain patches as needed. If the x-ray shows any abnormalities, further treatment options will be considered.       Follow Up:   Return if symptoms worsen or fail to improve.      Sal Astorga MD  [facility] [place] [facility]    Patient or patient representative verbalized consent for the use of Ambient Listening during the visit with  Sal Astorga MD for chart documentation. 5/6/2025  11:47 EDT

## 2025-05-09 ENCOUNTER — TELEPHONE (OUTPATIENT)
Dept: INTERNAL MEDICINE | Facility: CLINIC | Age: 55
End: 2025-05-09
Payer: MEDICAID

## 2025-05-09 DIAGNOSIS — M54.50 CHRONIC LOW BACK PAIN, UNSPECIFIED BACK PAIN LATERALITY, UNSPECIFIED WHETHER SCIATICA PRESENT: Primary | ICD-10-CM

## 2025-05-09 DIAGNOSIS — G89.29 CHRONIC LOW BACK PAIN, UNSPECIFIED BACK PAIN LATERALITY, UNSPECIFIED WHETHER SCIATICA PRESENT: Primary | ICD-10-CM

## 2025-05-09 RX ORDER — METHOCARBAMOL 500 MG/1
500 TABLET, FILM COATED ORAL 4 TIMES DAILY PRN
Qty: 60 TABLET | Refills: 1 | Status: SHIPPED | OUTPATIENT
Start: 2025-05-09

## 2025-05-09 NOTE — TELEPHONE ENCOUNTER
Caller: Luz Elena Ballard    Relationship: Self    Best call back number: [phone]     What medication are you requesting: METHOCARBAMOL      What are your current symptoms: SEVERE PAIN    Have you had these symptoms before:    [x] Yes  [] No    Have you been treated for these symptoms before:   [x] Yes  [] No    If a prescription is needed, what is your preferred pharmacy and phone number: [address] [phone] [facility] [phone]      Additional notes: PATIENT ADVISES THAT SHE HAS TAKEN THIS MEDICATION BEFORE AND WOULD LIKE A PRESCRIPTION CALLED IN FOR PAIN.

## 2025-05-13 DIAGNOSIS — I85.10 ESOPHAGEAL VARICES IN CIRRHOSIS: ICD-10-CM

## 2025-05-13 DIAGNOSIS — K74.60 ESOPHAGEAL VARICES IN CIRRHOSIS: ICD-10-CM

## 2025-05-13 DIAGNOSIS — K21.9 GASTROESOPHAGEAL REFLUX DISEASE, UNSPECIFIED WHETHER ESOPHAGITIS PRESENT: Chronic | ICD-10-CM

## 2025-05-14 RX ORDER — PANTOPRAZOLE SODIUM 40 MG/1
40 TABLET, DELAYED RELEASE ORAL DAILY
Qty: 30 TABLET | Refills: 11 | Status: SHIPPED | OUTPATIENT
Start: 2025-05-14

## 2025-05-22 ENCOUNTER — HOSPITAL ENCOUNTER (OUTPATIENT)
Facility: HOSPITAL | Age: 55
Discharge: HOME | End: 2025-05-22
Admitting: STUDENT IN AN ORGANIZED HEALTH CARE EDUCATION/TRAINING PROGRAM
Payer: MEDICAID

## 2025-05-22 DIAGNOSIS — M25.551 RIGHT HIP PAIN: ICD-10-CM

## 2025-05-22 PROCEDURE — 73721 MRI JNT OF LWR EXTRE W/O DYE: CPT

## 2025-05-23 DIAGNOSIS — R11.2 NAUSEA AND VOMITING, UNSPECIFIED VOMITING TYPE: ICD-10-CM

## 2025-05-23 RX ORDER — ONDANSETRON 4 MG/1
4 TABLET, ORALLY DISINTEGRATING ORAL EVERY 8 HOURS PRN
Qty: 30 TABLET | Refills: 3 | Status: SHIPPED | OUTPATIENT
Start: 2025-05-23

## 2025-05-28 DIAGNOSIS — M54.50 CHRONIC LOW BACK PAIN, UNSPECIFIED BACK PAIN LATERALITY, UNSPECIFIED WHETHER SCIATICA PRESENT: ICD-10-CM

## 2025-05-28 DIAGNOSIS — G89.29 CHRONIC LOW BACK PAIN, UNSPECIFIED BACK PAIN LATERALITY, UNSPECIFIED WHETHER SCIATICA PRESENT: ICD-10-CM

## 2025-05-28 RX ORDER — METHOCARBAMOL 500 MG/1
500 TABLET, FILM COATED ORAL 4 TIMES DAILY PRN
Qty: 60 TABLET | Refills: 1 | Status: SHIPPED | OUTPATIENT
Start: 2025-05-28

## 2025-06-02 DIAGNOSIS — G89.29 CHRONIC PAIN OF RIGHT KNEE: Primary | ICD-10-CM

## 2025-06-02 DIAGNOSIS — M25.561 CHRONIC PAIN OF RIGHT KNEE: Primary | ICD-10-CM

## 2025-06-17 LAB
NCCN CRITERIA FLAG: NORMAL
TYRER CUZICK SCORE: 4.7

## 2025-06-21 DIAGNOSIS — M54.50 CHRONIC LOW BACK PAIN, UNSPECIFIED BACK PAIN LATERALITY, UNSPECIFIED WHETHER SCIATICA PRESENT: ICD-10-CM

## 2025-06-21 DIAGNOSIS — G89.29 CHRONIC LOW BACK PAIN, UNSPECIFIED BACK PAIN LATERALITY, UNSPECIFIED WHETHER SCIATICA PRESENT: ICD-10-CM

## 2025-06-21 DIAGNOSIS — M70.61 TROCHANTERIC BURSITIS OF BOTH HIPS: ICD-10-CM

## 2025-06-21 DIAGNOSIS — M70.62 TROCHANTERIC BURSITIS OF BOTH HIPS: ICD-10-CM

## 2025-06-23 RX ORDER — METHOCARBAMOL 500 MG/1
500 TABLET, FILM COATED ORAL 4 TIMES DAILY PRN
Qty: 60 TABLET | Refills: 1 | Status: SHIPPED | OUTPATIENT
Start: 2025-06-23

## 2025-06-23 RX ORDER — PEN NEEDLE, DIABETIC 29 G X1/2"
NEEDLE, DISPOSABLE MISCELLANEOUS
Qty: 100 EACH | Refills: 0 | OUTPATIENT
Start: 2025-06-23

## 2025-06-23 NOTE — TELEPHONE ENCOUNTER
"Rx Refill Note  Requested Prescriptions     Pending Prescriptions Disp Refills    B-D INS SYR ULTRAFINE .3CC/30G 30G X 1/2\" 0.3 ML misc [Pharmacy Med Name: BD Insulin Syringe Ultra-Fine 0.3 mL 30 gauge x 1/2\"] 100 each 0     Sig: USE TWICE DAILY TO ADMINISTER INSULIN      Last office visit with prescribing clinician: 11/8/2024    Next office visit with prescribing clinician: 10/31/2025            "

## 2025-07-06 ENCOUNTER — APPOINTMENT (OUTPATIENT)
Dept: GENERAL RADIOLOGY | Facility: HOSPITAL | Age: 55
End: 2025-07-06
Payer: MEDICAID

## 2025-07-06 PROCEDURE — 99283 EMERGENCY DEPT VISIT LOW MDM: CPT

## 2025-07-06 PROCEDURE — 72100 X-RAY EXAM L-S SPINE 2/3 VWS: CPT

## 2025-07-06 PROCEDURE — 73502 X-RAY EXAM HIP UNI 2-3 VIEWS: CPT

## 2025-07-07 ENCOUNTER — HOSPITAL ENCOUNTER (EMERGENCY)
Facility: HOSPITAL | Age: 55
Discharge: HOME | End: 2025-07-07
Attending: EMERGENCY MEDICINE | Admitting: EMERGENCY MEDICINE
Payer: MEDICAID

## 2025-07-07 VITALS
TEMPERATURE: 98.3 F | SYSTOLIC BLOOD PRESSURE: 173 MMHG | WEIGHT: 183 LBS | DIASTOLIC BLOOD PRESSURE: 88 MMHG | RESPIRATION RATE: 22 BRPM | HEART RATE: 72 BPM | HEIGHT: 61 IN | BODY MASS INDEX: 34.55 KG/M2 | OXYGEN SATURATION: 94 %

## 2025-07-07 DIAGNOSIS — M25.551 CHRONIC RIGHT HIP PAIN: Primary | ICD-10-CM

## 2025-07-07 DIAGNOSIS — M24.151 DEGENERATIVE TEAR OF ACETABULAR LABRUM OF RIGHT HIP: ICD-10-CM

## 2025-07-07 DIAGNOSIS — G89.29 CHRONIC RIGHT HIP PAIN: Primary | ICD-10-CM

## 2025-07-07 PROCEDURE — 96372 THER/PROPH/DIAG INJ SC/IM: CPT

## 2025-07-07 PROCEDURE — 25010000002 KETOROLAC TROMETHAMINE PER 15 MG: Performed by: EMERGENCY MEDICINE

## 2025-07-07 RX ORDER — KETOROLAC TROMETHAMINE 30 MG/ML
30 INJECTION, SOLUTION INTRAMUSCULAR; INTRAVENOUS ONCE
Status: COMPLETED | OUTPATIENT
Start: 2025-07-07 | End: 2025-07-07

## 2025-07-07 RX ORDER — KETOROLAC TROMETHAMINE 10 MG/1
10 TABLET, FILM COATED ORAL EVERY 6 HOURS PRN
Qty: 15 TABLET | Refills: 0 | Status: SHIPPED | OUTPATIENT
Start: 2025-07-07 | End: 2025-07-12

## 2025-07-07 RX ADMIN — KETOROLAC TROMETHAMINE 30 MG: 30 INJECTION, SOLUTION INTRAMUSCULAR; INTRAVENOUS at 00:20

## 2025-07-07 NOTE — DISCHARGE INSTRUCTIONS
Ice 20 minutes at a time multiple times a day to the right hip.    Take Toradol as needed help with pain.    Keep outpatient follow-up with your primary care physician and with orthopedic surgery

## 2025-07-07 NOTE — Clinical Note
[facility] EMERGENCY DEPARTMENT  [address]  [facility] [address]  Phone: [phone]    Luz Elena Ballard was seen and treated in our emergency department on 7/6/2025.  She may return to work on 07/09/2025.         Thank you for choosing [facility].    Jose Enrique Cornelius MD
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
## 2025-07-16 DIAGNOSIS — G89.29 CHRONIC LOW BACK PAIN, UNSPECIFIED BACK PAIN LATERALITY, UNSPECIFIED WHETHER SCIATICA PRESENT: ICD-10-CM

## 2025-07-16 DIAGNOSIS — M54.50 CHRONIC LOW BACK PAIN, UNSPECIFIED BACK PAIN LATERALITY, UNSPECIFIED WHETHER SCIATICA PRESENT: ICD-10-CM

## 2025-07-16 RX ORDER — METHOCARBAMOL 500 MG/1
500 TABLET, FILM COATED ORAL 4 TIMES DAILY PRN
Qty: 60 TABLET | Refills: 1 | Status: SHIPPED | OUTPATIENT
Start: 2025-07-16

## 2025-07-23 ENCOUNTER — TELEPHONE (OUTPATIENT)
Dept: INTERNAL MEDICINE | Facility: CLINIC | Age: 55
End: 2025-07-23
Payer: MEDICAID

## 2025-07-23 NOTE — TELEPHONE ENCOUNTER
Called patient and LMOM regarding overdue Mammogram order.       RELAY-- See if patient will call central scheduling or let us know if she would like to have this order cancelled.

## 2025-08-13 ENCOUNTER — OFFICE VISIT (OUTPATIENT)
Dept: BEHAVIORAL HEALTH | Facility: CLINIC | Age: 55
End: 2025-08-13
Payer: MEDICAID

## 2025-08-13 VITALS
OXYGEN SATURATION: 98 % | SYSTOLIC BLOOD PRESSURE: 132 MMHG | HEART RATE: 94 BPM | DIASTOLIC BLOOD PRESSURE: 86 MMHG | HEIGHT: 61 IN | WEIGHT: 157 LBS | BODY MASS INDEX: 29.64 KG/M2

## 2025-08-13 DIAGNOSIS — G47.00 INSOMNIA, UNSPECIFIED TYPE: ICD-10-CM

## 2025-08-13 DIAGNOSIS — F25.0 SCHIZOAFFECTIVE DISORDER, BIPOLAR TYPE: Primary | ICD-10-CM

## 2025-08-13 DIAGNOSIS — F41.1 GAD (GENERALIZED ANXIETY DISORDER): Chronic | ICD-10-CM

## 2025-08-13 RX ORDER — QUETIAPINE FUMARATE 25 MG/1
25 TABLET, FILM COATED ORAL NIGHTLY
Qty: 90 TABLET | Refills: 0 | Status: SHIPPED | OUTPATIENT
Start: 2025-08-13

## 2025-08-13 RX ORDER — LUMATEPERONE 42 MG/1
42 CAPSULE ORAL NIGHTLY
Qty: 28 CAPSULE | Refills: 0 | COMMUNITY
Start: 2025-08-13 | End: 2025-08-18 | Stop reason: SINTOL

## 2025-08-13 RX ORDER — MIRTAZAPINE 7.5 MG/1
7.5 TABLET, FILM COATED ORAL NIGHTLY
Qty: 90 TABLET | Refills: 0 | Status: SHIPPED | OUTPATIENT
Start: 2025-08-13

## 2025-08-18 ENCOUNTER — TELEPHONE (OUTPATIENT)
Dept: BEHAVIORAL HEALTH | Facility: CLINIC | Age: 55
End: 2025-08-18
Payer: MEDICAID

## 2025-08-18 DIAGNOSIS — F31.60 BIPOLAR AFFECTIVE DISORDER, MIXED: Primary | ICD-10-CM

## 2025-08-18 PROBLEM — F25.0 SCHIZOAFFECTIVE DISORDER, BIPOLAR TYPE: Status: RESOLVED | Noted: 2025-08-13 | Resolved: 2025-08-18

## 2025-08-19 ENCOUNTER — OFFICE VISIT (OUTPATIENT)
Dept: INTERNAL MEDICINE | Facility: CLINIC | Age: 55
End: 2025-08-19
Payer: MEDICAID

## 2025-08-19 VITALS
SYSTOLIC BLOOD PRESSURE: 140 MMHG | BODY MASS INDEX: 31.1 KG/M2 | RESPIRATION RATE: 10 BRPM | TEMPERATURE: 97.8 F | WEIGHT: 164.6 LBS | DIASTOLIC BLOOD PRESSURE: 78 MMHG | HEART RATE: 62 BPM

## 2025-08-19 DIAGNOSIS — R01.1 HEART MURMUR: ICD-10-CM

## 2025-08-19 DIAGNOSIS — G89.29 CHRONIC RIGHT HIP PAIN: Primary | ICD-10-CM

## 2025-08-19 DIAGNOSIS — E11.65 TYPE 2 DIABETES MELLITUS WITH HYPERGLYCEMIA, WITH LONG-TERM CURRENT USE OF INSULIN: ICD-10-CM

## 2025-08-19 DIAGNOSIS — K74.60 CIRRHOSIS OF LIVER WITHOUT ASCITES, UNSPECIFIED HEPATIC CIRRHOSIS TYPE: ICD-10-CM

## 2025-08-19 DIAGNOSIS — M25.561 CHRONIC PAIN OF RIGHT KNEE: ICD-10-CM

## 2025-08-19 DIAGNOSIS — Z00.00 ROUTINE HEALTH MAINTENANCE: ICD-10-CM

## 2025-08-19 DIAGNOSIS — R60.0 EDEMA LEG: ICD-10-CM

## 2025-08-19 DIAGNOSIS — M25.551 CHRONIC RIGHT HIP PAIN: Primary | ICD-10-CM

## 2025-08-19 DIAGNOSIS — G89.29 CHRONIC MIDLINE LOW BACK PAIN WITH RIGHT-SIDED SCIATICA: ICD-10-CM

## 2025-08-19 DIAGNOSIS — Z13.6 SCREENING FOR ISCHEMIC HEART DISEASE: ICD-10-CM

## 2025-08-19 DIAGNOSIS — G89.29 CHRONIC PAIN OF RIGHT KNEE: ICD-10-CM

## 2025-08-19 DIAGNOSIS — M54.41 CHRONIC MIDLINE LOW BACK PAIN WITH RIGHT-SIDED SCIATICA: ICD-10-CM

## 2025-08-19 DIAGNOSIS — Z79.4 TYPE 2 DIABETES MELLITUS WITH HYPERGLYCEMIA, WITH LONG-TERM CURRENT USE OF INSULIN: ICD-10-CM

## 2025-08-19 DIAGNOSIS — I10 ESSENTIAL HYPERTENSION: ICD-10-CM

## 2025-08-19 LAB
ALBUMIN SERPL-MCNC: 2.6 G/DL (ref 3.5–5.2)
ALBUMIN/GLOB SERPL: 0.6 G/DL
ALP SERPL-CCNC: 120 U/L (ref 39–117)
ALPHA-FETOPROTEIN: 6.68 NG/ML (ref 0–8.3)
ALT SERPL W P-5'-P-CCNC: 34 U/L (ref 1–33)
ANION GAP SERPL CALCULATED.3IONS-SCNC: 10 MMOL/L (ref 5–15)
AST SERPL-CCNC: 51 U/L (ref 1–32)
BACTERIA UR QL AUTO: ABNORMAL /HPF
BASOPHILS # BLD AUTO: 0.04 10*3/MM3 (ref 0–0.2)
BASOPHILS NFR BLD AUTO: 0.7 % (ref 0–1.5)
BILIRUB SERPL-MCNC: 0.8 MG/DL (ref 0–1.2)
BILIRUB UR QL STRIP: NEGATIVE
BUN SERPL-MCNC: 12 MG/DL (ref 6–20)
BUN/CREAT SERPL: 10.5 (ref 7–25)
CALCIUM SPEC-SCNC: 8.3 MG/DL (ref 8.6–10.5)
CHLORIDE SERPL-SCNC: 106 MMOL/L (ref 98–107)
CHOLEST SERPL-MCNC: 97 MG/DL (ref 0–200)
CLARITY UR: ABNORMAL
CO2 SERPL-SCNC: 23 MMOL/L (ref 22–29)
COLOR UR: ABNORMAL
CREAT SERPL-MCNC: 1.14 MG/DL (ref 0.57–1)
DEPRECATED RDW RBC AUTO: 56.4 FL (ref 37–54)
EGFRCR SERPLBLD CKD-EPI 2021: 57.3 ML/MIN/1.73
EOSINOPHIL # BLD AUTO: 0.09 10*3/MM3 (ref 0–0.4)
EOSINOPHIL NFR BLD AUTO: 1.6 % (ref 0.3–6.2)
ERYTHROCYTE [DISTWIDTH] IN BLOOD BY AUTOMATED COUNT: 15.9 % (ref 12.3–15.4)
GLOBULIN UR ELPH-MCNC: 4.3 GM/DL
GLUCOSE SERPL-MCNC: 233 MG/DL (ref 65–99)
GLUCOSE UR STRIP-MCNC: NEGATIVE MG/DL
HBA1C MFR BLD: 8.2 % (ref 4.8–5.6)
HCT VFR BLD AUTO: 41.5 % (ref 34–46.6)
HDLC SERPL-MCNC: 32 MG/DL (ref 40–60)
HGB BLD-MCNC: 13.3 G/DL (ref 12–15.9)
HGB UR QL STRIP.AUTO: NEGATIVE
HYALINE CASTS UR QL AUTO: ABNORMAL /LPF
IMM GRANULOCYTES # BLD AUTO: 0.01 10*3/MM3 (ref 0–0.05)
IMM GRANULOCYTES NFR BLD AUTO: 0.2 % (ref 0–0.5)
KETONES UR QL STRIP: ABNORMAL
LDLC SERPL CALC-MCNC: 39 MG/DL (ref 0–100)
LDLC/HDLC SERPL: 1.08 {RATIO}
LEUKOCYTE ESTERASE UR QL STRIP.AUTO: ABNORMAL
LYMPHOCYTES # BLD AUTO: 1.26 10*3/MM3 (ref 0.7–3.1)
LYMPHOCYTES NFR BLD AUTO: 23.1 % (ref 19.6–45.3)
MCH RBC QN AUTO: 31.9 PG (ref 26.6–33)
MCHC RBC AUTO-ENTMCNC: 32 G/DL (ref 31.5–35.7)
MCV RBC AUTO: 99.5 FL (ref 79–97)
MONOCYTES # BLD AUTO: 0.37 10*3/MM3 (ref 0.1–0.9)
MONOCYTES NFR BLD AUTO: 6.8 % (ref 5–12)
NEUTROPHILS NFR BLD AUTO: 3.69 10*3/MM3 (ref 1.7–7)
NEUTROPHILS NFR BLD AUTO: 67.6 % (ref 42.7–76)
NITRITE UR QL STRIP: NEGATIVE
PH UR STRIP.AUTO: 5.5 [PH] (ref 5–8)
PLATELET # BLD AUTO: 116 10*3/MM3 (ref 140–450)
PMV BLD AUTO: 10.8 FL (ref 6–12)
POTASSIUM SERPL-SCNC: 3.9 MMOL/L (ref 3.5–5.2)
PROT SERPL-MCNC: 6.9 G/DL (ref 6–8.5)
PROT UR QL STRIP: ABNORMAL
RBC # BLD AUTO: 4.17 10*6/MM3 (ref 3.77–5.28)
RBC # UR STRIP: ABNORMAL /HPF
REF LAB TEST METHOD: ABNORMAL
SODIUM SERPL-SCNC: 139 MMOL/L (ref 136–145)
SP GR UR STRIP: >1.03 (ref 1–1.03)
SQUAMOUS #/AREA URNS HPF: ABNORMAL /HPF
TRIGL SERPL-MCNC: 152 MG/DL (ref 0–150)
UROBILINOGEN UR QL STRIP: ABNORMAL
VLDLC SERPL-MCNC: 26 MG/DL (ref 5–40)
WBC # UR STRIP: ABNORMAL /HPF
WBC NRBC COR # BLD AUTO: 5.46 10*3/MM3 (ref 3.4–10.8)

## 2025-08-19 PROCEDURE — 82105 ALPHA-FETOPROTEIN SERUM: CPT | Performed by: STUDENT IN AN ORGANIZED HEALTH CARE EDUCATION/TRAINING PROGRAM

## 2025-08-19 PROCEDURE — 3078F DIAST BP <80 MM HG: CPT | Performed by: STUDENT IN AN ORGANIZED HEALTH CARE EDUCATION/TRAINING PROGRAM

## 2025-08-19 PROCEDURE — 83036 HEMOGLOBIN GLYCOSYLATED A1C: CPT | Performed by: STUDENT IN AN ORGANIZED HEALTH CARE EDUCATION/TRAINING PROGRAM

## 2025-08-19 PROCEDURE — 1159F MED LIST DOCD IN RCRD: CPT | Performed by: STUDENT IN AN ORGANIZED HEALTH CARE EDUCATION/TRAINING PROGRAM

## 2025-08-19 PROCEDURE — 80053 COMPREHEN METABOLIC PANEL: CPT | Performed by: STUDENT IN AN ORGANIZED HEALTH CARE EDUCATION/TRAINING PROGRAM

## 2025-08-19 PROCEDURE — 85025 COMPLETE CBC W/AUTO DIFF WBC: CPT | Performed by: STUDENT IN AN ORGANIZED HEALTH CARE EDUCATION/TRAINING PROGRAM

## 2025-08-19 PROCEDURE — 1160F RVW MEDS BY RX/DR IN RCRD: CPT | Performed by: STUDENT IN AN ORGANIZED HEALTH CARE EDUCATION/TRAINING PROGRAM

## 2025-08-19 PROCEDURE — 3077F SYST BP >= 140 MM HG: CPT | Performed by: STUDENT IN AN ORGANIZED HEALTH CARE EDUCATION/TRAINING PROGRAM

## 2025-08-19 PROCEDURE — 80061 LIPID PANEL: CPT | Performed by: STUDENT IN AN ORGANIZED HEALTH CARE EDUCATION/TRAINING PROGRAM

## 2025-08-19 PROCEDURE — 99214 OFFICE O/P EST MOD 30 MIN: CPT | Performed by: STUDENT IN AN ORGANIZED HEALTH CARE EDUCATION/TRAINING PROGRAM

## 2025-08-19 PROCEDURE — 1125F AMNT PAIN NOTED PAIN PRSNT: CPT | Performed by: STUDENT IN AN ORGANIZED HEALTH CARE EDUCATION/TRAINING PROGRAM

## 2025-08-19 PROCEDURE — 81001 URINALYSIS AUTO W/SCOPE: CPT | Performed by: STUDENT IN AN ORGANIZED HEALTH CARE EDUCATION/TRAINING PROGRAM

## 2025-08-19 RX ORDER — HYDROCHLOROTHIAZIDE 12.5 MG/1
12.5 TABLET ORAL DAILY
Qty: 90 TABLET | Refills: 0 | Status: SHIPPED | OUTPATIENT
Start: 2025-08-19

## 2025-08-20 DIAGNOSIS — R41.3 MEMORY DEFICITS: ICD-10-CM

## 2025-08-20 DIAGNOSIS — F33.1 MODERATE EPISODE OF RECURRENT MAJOR DEPRESSIVE DISORDER: ICD-10-CM

## 2025-08-20 DIAGNOSIS — F41.1 GAD (GENERALIZED ANXIETY DISORDER): Chronic | ICD-10-CM

## 2025-08-20 DIAGNOSIS — F31.60 BIPOLAR AFFECTIVE DISORDER, MIXED: Primary | ICD-10-CM

## 2025-08-20 RX ORDER — BUPROPION HYDROCHLORIDE 300 MG/1
300 TABLET ORAL EVERY MORNING
Qty: 90 TABLET | Refills: 0 | Status: SHIPPED | OUTPATIENT
Start: 2025-08-20

## 2025-08-26 ENCOUNTER — TELEPHONE (OUTPATIENT)
Dept: INTERNAL MEDICINE | Facility: CLINIC | Age: 55
End: 2025-08-26
Payer: MEDICAID

## 2025-08-28 ENCOUNTER — TELEPHONE (OUTPATIENT)
Dept: INTERNAL MEDICINE | Facility: CLINIC | Age: 55
End: 2025-08-28